# Patient Record
Sex: MALE | Race: WHITE | NOT HISPANIC OR LATINO | Employment: FULL TIME | URBAN - METROPOLITAN AREA
[De-identification: names, ages, dates, MRNs, and addresses within clinical notes are randomized per-mention and may not be internally consistent; named-entity substitution may affect disease eponyms.]

---

## 2017-05-17 ENCOUNTER — RADIANT APPOINTMENT (OUTPATIENT)
Dept: GENERAL RADIOLOGY | Facility: CLINIC | Age: 47
End: 2017-05-17
Attending: FAMILY MEDICINE
Payer: COMMERCIAL

## 2017-05-17 ENCOUNTER — OFFICE VISIT (OUTPATIENT)
Dept: FAMILY MEDICINE | Facility: CLINIC | Age: 47
End: 2017-05-17
Payer: COMMERCIAL

## 2017-05-17 VITALS
RESPIRATION RATE: 14 BRPM | HEIGHT: 67 IN | SYSTOLIC BLOOD PRESSURE: 130 MMHG | HEART RATE: 100 BPM | DIASTOLIC BLOOD PRESSURE: 70 MMHG | BODY MASS INDEX: 41.28 KG/M2 | WEIGHT: 263 LBS | TEMPERATURE: 97.6 F

## 2017-05-17 DIAGNOSIS — M79.672 LEFT FOOT PAIN: Primary | ICD-10-CM

## 2017-05-17 DIAGNOSIS — M10.9 GOUT, UNSPECIFIED: ICD-10-CM

## 2017-05-17 DIAGNOSIS — I10 HYPERTENSION GOAL BP (BLOOD PRESSURE) < 140/90: ICD-10-CM

## 2017-05-17 DIAGNOSIS — R73.01 IMPAIRED FASTING GLUCOSE: ICD-10-CM

## 2017-05-17 DIAGNOSIS — E11.9 DIABETES MELLITUS WITHOUT COMPLICATION (H): ICD-10-CM

## 2017-05-17 DIAGNOSIS — E78.5 HYPERLIPIDEMIA LDL GOAL <100: ICD-10-CM

## 2017-05-17 DIAGNOSIS — M79.672 LEFT FOOT PAIN: ICD-10-CM

## 2017-05-17 DIAGNOSIS — M10.072 ACUTE IDIOPATHIC GOUT OF LEFT FOOT: ICD-10-CM

## 2017-05-17 LAB
ANION GAP SERPL CALCULATED.3IONS-SCNC: 10 MMOL/L (ref 3–14)
BUN SERPL-MCNC: 21 MG/DL (ref 7–30)
CALCIUM SERPL-MCNC: 9.1 MG/DL (ref 8.5–10.1)
CHLORIDE SERPL-SCNC: 104 MMOL/L (ref 94–109)
CHOLEST SERPL-MCNC: 155 MG/DL
CO2 SERPL-SCNC: 25 MMOL/L (ref 20–32)
CREAT SERPL-MCNC: 0.97 MG/DL (ref 0.66–1.25)
CREAT UR-MCNC: 181 MG/DL
ERYTHROCYTE [DISTWIDTH] IN BLOOD BY AUTOMATED COUNT: 12.7 % (ref 10–15)
GFR SERPL CREATININE-BSD FRML MDRD: 82 ML/MIN/1.7M2
GLUCOSE SERPL-MCNC: 108 MG/DL (ref 70–99)
HBA1C MFR BLD: 5.4 % (ref 4.3–6)
HCT VFR BLD AUTO: 43.4 % (ref 40–53)
HDLC SERPL-MCNC: 18 MG/DL
HGB BLD-MCNC: 14.6 G/DL (ref 13.3–17.7)
LDLC SERPL CALC-MCNC: 103 MG/DL
MCH RBC QN AUTO: 32.7 PG (ref 26.5–33)
MCHC RBC AUTO-ENTMCNC: 33.6 G/DL (ref 31.5–36.5)
MCV RBC AUTO: 97 FL (ref 78–100)
MICROALBUMIN UR-MCNC: 13 MG/L
MICROALBUMIN/CREAT UR: 7.24 MG/G CR (ref 0–17)
NONHDLC SERPL-MCNC: 137 MG/DL
PLATELET # BLD AUTO: 252 10E9/L (ref 150–450)
POTASSIUM SERPL-SCNC: 3.7 MMOL/L (ref 3.4–5.3)
RBC # BLD AUTO: 4.47 10E12/L (ref 4.4–5.9)
SODIUM SERPL-SCNC: 139 MMOL/L (ref 133–144)
TRIGL SERPL-MCNC: 168 MG/DL
URATE SERPL-MCNC: 9.5 MG/DL (ref 3.5–7.2)
WBC # BLD AUTO: 8.2 10E9/L (ref 4–11)

## 2017-05-17 PROCEDURE — 36415 COLL VENOUS BLD VENIPUNCTURE: CPT | Performed by: FAMILY MEDICINE

## 2017-05-17 PROCEDURE — 85027 COMPLETE CBC AUTOMATED: CPT | Performed by: FAMILY MEDICINE

## 2017-05-17 PROCEDURE — 73630 X-RAY EXAM OF FOOT: CPT | Mod: LT

## 2017-05-17 PROCEDURE — 84550 ASSAY OF BLOOD/URIC ACID: CPT | Performed by: FAMILY MEDICINE

## 2017-05-17 PROCEDURE — 82043 UR ALBUMIN QUANTITATIVE: CPT | Performed by: FAMILY MEDICINE

## 2017-05-17 PROCEDURE — 80061 LIPID PANEL: CPT | Performed by: FAMILY MEDICINE

## 2017-05-17 PROCEDURE — 80048 BASIC METABOLIC PNL TOTAL CA: CPT | Performed by: FAMILY MEDICINE

## 2017-05-17 PROCEDURE — 83036 HEMOGLOBIN GLYCOSYLATED A1C: CPT | Performed by: FAMILY MEDICINE

## 2017-05-17 PROCEDURE — 99214 OFFICE O/P EST MOD 30 MIN: CPT | Performed by: FAMILY MEDICINE

## 2017-05-17 RX ORDER — HYDROCODONE BITARTRATE AND ACETAMINOPHEN 5; 325 MG/1; MG/1
1 TABLET ORAL EVERY 6 HOURS PRN
Qty: 15 TABLET | Refills: 0 | Status: SHIPPED | OUTPATIENT
Start: 2017-05-17 | End: 2017-12-12

## 2017-05-17 RX ORDER — INDOMETHACIN 50 MG/1
CAPSULE ORAL
Qty: 24 CAPSULE | Refills: 1 | Status: SHIPPED | OUTPATIENT
Start: 2017-05-17 | End: 2017-12-12

## 2017-05-17 NOTE — PROGRESS NOTES
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 States was treated with meds inpast and there was disucssion aout gout but he thinks th e issue is hey work and ight boot.s recalls no trauma.   PROBLEMS TO ADD ON...    Problem list and histories reviewed & adjusted, as indicated.  Additional history: as documented    Swollen and sore left foot  Patient Active Problem List   Diagnosis     Hypertension goal BP (blood pressure) < 140/90     BMI of 40.0-44.9, adult     Hyperlipidemia LDL goal <100     Impaired fasting glucose     Abnormal results of liver function studies     Gout     Preventive measure     History reviewed. No pertinent surgical history.    Social History   Substance Use Topics     Smoking status: Never Smoker     Smokeless tobacco: Never Used     Alcohol use Yes      Comment: occ.     Family History   Problem Relation Age of Onset     DIABETES Mother      Family History Negative Father      health hx unknown; smoker           Reviewed and updated as needed this visit by clinical staff  Allergies  Meds       Reviewed and updated as needed this visit by Provider         ROS:  CONSTITUTIONAL:NEGATIVE for fever, chills, change in weight  INTEGUMENTARY/SKIN: left foot is swollen and  "red on dorsum of foot.   MUSCULOSKELETAL: painful left foot  NEURO: NEGATIVE for weakness, dizziness or paresthesias  ENDOCRINE: NEGATIVE for temperature intolerance, skin/hair changes and hx elevated uric acid. Also has TYPE 2 DIABETES ON METFORMIN.     OBJECTIVE:                                                    /70  Pulse 100  Temp 97.6  F (36.4  C) (Tympanic)  Resp 14  Ht 5' 6.5\" (1.689 m)  Wt 263 lb (119.3 kg)  BMI 41.81 kg/m2  Body mass index is 41.81 kg/(m^2).  GENERAL APPEARANCE: healthy, alert and moderate distress  EYES: Eyes grossly normal to inspection, PERRL and conjunctivae and sclerae normal  MS: swollen left foot with red dorsum of foot no tenderness of great toe mtp. anle is negative able to move ankle well.   SKIN: dorsum of lef tfoot is swollen and red some tenderness proximal dorsum of left foot. The distal foot is red, no tenderenss with palpation of great toe mtp.joint;     Diagnostic test results:  Diagnostic Test Results:  Xray neative other labs pending previous uric acid was elevated. xrays will go to radiologist for interpretation.      ASSESSMENT/PLAN:                                                    1. Left foot pain    - XR Foot Left G/E 3 Views; Future    2. Diabetes mellitus without complication (H)    - Basic metabolic panel  - Hemoglobin A1c  - Albumin Random Urine Quantitative    3. Hyperlipidemia LDL goal <100    - Lipid Profile (Chol, Trig, HDL, LDL calc)    4. Acute idiopathic gout of left foot    - Uric acid  - CBC with platelets    5. Impaired fasting glucose    - metFORMIN (GLUCOPHAGE) 500 MG tablet; Take 500 mg with supper for the first 2 weeks, then take 1,000 mg with supper.  Dispense: 60 tablet; Refill: 5    6. Hypertension goal BP (blood pressure) < 140/90    - aspirin 81 MG tablet; Take 1 tablet (81 mg) by mouth daily  Dispense: 30 tablet; Refill: 11    7. Gout, unspecified    - indomethacin (INDOCIN) 50 MG capsule; Take 1 capsule PO tid with meals for 7 " days Take 1 capsule PO bid with meals on day 8. Take 1 capsule PO once with meals on day 9.  Dispense: 24 capsule; Refill: 1  - HYDROcodone-acetaminophen (NORCO) 5-325 MG per tablet; Take 1 tablet by mouth every 6 hours as needed for moderate to severe pain  Dispense: 15 tablet; Refill: 0      Labs and medications as ordered discussed probalbe gout he is convinced rlated to heavy use when gardening.   Follow up with Provider - 2-3 week or as needed.      Jeremie Caro MD  [facility]

## 2017-05-17 NOTE — LETTER
[facility]  [address]  [place] [address]  [phone]                                                                                                           Waylon Santiago  [address] [facility] [address]    May 18, 2017          Dear Waylon Santiago,  Results for orders placed or performed in visit on 05/17/17   XR Foot Left G/E 3 Views    Narrative    LEFT FOOT THREE OR MORE VIEWS  5/17/2017 9:49 AM     HISTORY: Pain in left foot.    COMPARISON: None.      Impression    IMPRESSION: Bones are normally aligned. No acute fracture.    NIKITA MARES MD         Sincerely,    Abdirizak Horner MD

## 2017-05-17 NOTE — NURSING NOTE
"Chief Complaint   Patient presents with     Foot Pain     LT       Initial /70  Pulse 100  Temp 97.6  F (36.4  C) (Tympanic)  Resp 14  Ht 5' 6.5\" (1.689 m)  Wt 263 lb (119.3 kg)  BMI 41.81 kg/m2 Estimated body mass index is 41.81 kg/(m^2) as calculated from the following:    Height as of this encounter: 5' 6.5\" (1.689 m).    Weight as of this encounter: 263 lb (119.3 kg).  Medication Reconciliation: complete     Gayla No CMA      "

## 2017-05-19 ENCOUNTER — TELEPHONE (OUTPATIENT)
Dept: FAMILY MEDICINE | Facility: CLINIC | Age: 47
End: 2017-05-19

## 2017-05-19 DIAGNOSIS — L03.116 CELLULITIS OF FOOT WITHOUT TOES, LEFT: Primary | ICD-10-CM

## 2017-05-19 RX ORDER — CEPHALEXIN 500 MG/1
500 CAPSULE ORAL 2 TIMES DAILY
Qty: 14 CAPSULE | Refills: 0 | Status: SHIPPED | OUTPATIENT
Start: 2017-05-19 | End: 2017-05-26

## 2017-05-19 NOTE — TELEPHONE ENCOUNTER
"Unfortunately provider's note not complete from OV on 5/17/2017. Based on available information, I'm assuming concerns for possible cellulitis if not improving on NSAIDS. Since sx not improving, will treat with empiric antibiotics. Patient reports no allergies to any antibiotics per chart review. Will treat with Keflex x7 days, rx sent to patient's pharmacy. Recommend taking with food; may cause loose stool as SE (not diarrhea, just \"texture change\"). If not improved by Monday, recommend recheck in clinic.  "

## 2017-05-19 NOTE — TELEPHONE ENCOUNTER
Patient called reporting left foot pain    Acute Illness   Acute illness concerns: Left foot pain, not warm to the touch, drainage, 8/10, ache when trying to walk on it, woke him up in the middle of the night from pain at rest  Onset: 3 days    Fever: no    Chills/Sweats: YES, chills last night, not sure if it is because of the temperature of the house or what    Headache (location?): no    Sinus Pressure:no    Conjunctivitis:  no    Ear Pain: no    Rhinorrhea: no    Congestion: no    Sore Throat: no     Cough: no    Wheeze: no    Decreased Appetite: no    Nausea: no    Vomiting: no    Diarrhea:  no    Dysuria/Freq.: no    Fatigue/Achiness: no    Sick/Strep Exposure: no     Therapies Tried and outcome: indocin, decreased swelling, but did not get rid of it. Dr. Caro said that if the pain didn't get better by today for the patient to call and he would prescribe an antibiotic, the patient wants it sent to [facility] [address] @ [address]            Please advise as appropriate with further recommendations as appropriate.    Nikole Moran, RN  Triage RN  [facility]    Routing to provider to see if they would feel comfortable prescribing an antibiotic for this patient or if he needs to come back in and be seen.

## 2017-05-19 NOTE — TELEPHONE ENCOUNTER
Reason for call:  Patient reporting a symptom  Symptom or request: foot pain  Duration (how long have symptoms been present): 1 week  Have you been treated for this before? Yes  Additional comments: patient saw Dr Caro on 05/17/17 and was told to call if not better  Phone Number patient can be reached at:  Other phone number:  [phone]  Best Time:  9 am  Can we leave a detailed message on this number:  YES  Call taken on 5/19/2017 at 8:04 AM by PATRICIA JARA

## 2017-12-12 ENCOUNTER — OFFICE VISIT (OUTPATIENT)
Dept: FAMILY MEDICINE | Facility: CLINIC | Age: 47
End: 2017-12-12
Payer: COMMERCIAL

## 2017-12-12 VITALS
WEIGHT: 268 LBS | RESPIRATION RATE: 18 BRPM | SYSTOLIC BLOOD PRESSURE: 124 MMHG | BODY MASS INDEX: 42.61 KG/M2 | HEART RATE: 78 BPM | DIASTOLIC BLOOD PRESSURE: 80 MMHG | TEMPERATURE: 98 F

## 2017-12-12 DIAGNOSIS — R73.01 IMPAIRED FASTING GLUCOSE: ICD-10-CM

## 2017-12-12 DIAGNOSIS — E78.5 HYPERLIPIDEMIA LDL GOAL <100: ICD-10-CM

## 2017-12-12 DIAGNOSIS — I10 HYPERTENSION GOAL BP (BLOOD PRESSURE) < 140/90: Primary | ICD-10-CM

## 2017-12-12 DIAGNOSIS — M1A.0790 IDIOPATHIC CHRONIC GOUT OF FOOT WITHOUT TOPHUS, UNSPECIFIED LATERALITY: ICD-10-CM

## 2017-12-12 DIAGNOSIS — E66.01 MORBID OBESITY (H): ICD-10-CM

## 2017-12-12 LAB
ALBUMIN SERPL-MCNC: 4 G/DL (ref 3.4–5)
ALBUMIN UR-MCNC: NEGATIVE MG/DL
ALP SERPL-CCNC: 91 U/L (ref 40–150)
ALT SERPL W P-5'-P-CCNC: 69 U/L (ref 0–70)
ANION GAP SERPL CALCULATED.3IONS-SCNC: 8 MMOL/L (ref 3–14)
APPEARANCE UR: CLEAR
AST SERPL W P-5'-P-CCNC: 24 U/L (ref 0–45)
BASOPHILS # BLD AUTO: 0 10E9/L (ref 0–0.2)
BASOPHILS NFR BLD AUTO: 0.6 %
BILIRUB SERPL-MCNC: 0.5 MG/DL (ref 0.2–1.3)
BILIRUB UR QL STRIP: NEGATIVE
BUN SERPL-MCNC: 22 MG/DL (ref 7–30)
CALCIUM SERPL-MCNC: 8.9 MG/DL (ref 8.5–10.1)
CHLORIDE SERPL-SCNC: 102 MMOL/L (ref 94–109)
CHOLEST SERPL-MCNC: 147 MG/DL
CO2 SERPL-SCNC: 28 MMOL/L (ref 20–32)
COLOR UR AUTO: YELLOW
CREAT SERPL-MCNC: 1 MG/DL (ref 0.66–1.25)
DIFFERENTIAL METHOD BLD: NORMAL
EOSINOPHIL # BLD AUTO: 0.2 10E9/L (ref 0–0.7)
EOSINOPHIL NFR BLD AUTO: 2.8 %
ERYTHROCYTE [DISTWIDTH] IN BLOOD BY AUTOMATED COUNT: 12.6 % (ref 10–15)
GFR SERPL CREATININE-BSD FRML MDRD: 80 ML/MIN/1.7M2
GLUCOSE SERPL-MCNC: 114 MG/DL (ref 70–99)
GLUCOSE UR STRIP-MCNC: NEGATIVE MG/DL
HCT VFR BLD AUTO: 43.7 % (ref 40–53)
HDLC SERPL-MCNC: 17 MG/DL
HGB BLD-MCNC: 14.9 G/DL (ref 13.3–17.7)
HGB UR QL STRIP: NEGATIVE
KETONES UR STRIP-MCNC: NEGATIVE MG/DL
LDLC SERPL CALC-MCNC: 87 MG/DL
LEUKOCYTE ESTERASE UR QL STRIP: NEGATIVE
LYMPHOCYTES # BLD AUTO: 1.7 10E9/L (ref 0.8–5.3)
LYMPHOCYTES NFR BLD AUTO: 25.3 %
MCH RBC QN AUTO: 32.6 PG (ref 26.5–33)
MCHC RBC AUTO-ENTMCNC: 34.1 G/DL (ref 31.5–36.5)
MCV RBC AUTO: 96 FL (ref 78–100)
MONOCYTES # BLD AUTO: 0.7 10E9/L (ref 0–1.3)
MONOCYTES NFR BLD AUTO: 9.5 %
NEUTROPHILS # BLD AUTO: 4.2 10E9/L (ref 1.6–8.3)
NEUTROPHILS NFR BLD AUTO: 61.8 %
NITRATE UR QL: NEGATIVE
NONHDLC SERPL-MCNC: 130 MG/DL
PH UR STRIP: 5.5 PH (ref 5–7)
PLATELET # BLD AUTO: 228 10E9/L (ref 150–450)
POTASSIUM SERPL-SCNC: 3.8 MMOL/L (ref 3.4–5.3)
PROT SERPL-MCNC: 7.4 G/DL (ref 6.8–8.8)
RBC # BLD AUTO: 4.57 10E12/L (ref 4.4–5.9)
RBC #/AREA URNS AUTO: NORMAL /HPF
SODIUM SERPL-SCNC: 138 MMOL/L (ref 133–144)
SOURCE: NORMAL
SP GR UR STRIP: 1.01 (ref 1–1.03)
TRIGL SERPL-MCNC: 215 MG/DL
URATE SERPL-MCNC: 11.9 MG/DL (ref 3.5–7.2)
UROBILINOGEN UR STRIP-ACNC: 0.2 EU/DL (ref 0.2–1)
WBC # BLD AUTO: 6.8 10E9/L (ref 4–11)
WBC #/AREA URNS AUTO: NORMAL /HPF

## 2017-12-12 PROCEDURE — 81001 URINALYSIS AUTO W/SCOPE: CPT | Performed by: FAMILY MEDICINE

## 2017-12-12 PROCEDURE — 36415 COLL VENOUS BLD VENIPUNCTURE: CPT | Performed by: FAMILY MEDICINE

## 2017-12-12 PROCEDURE — 80061 LIPID PANEL: CPT | Performed by: FAMILY MEDICINE

## 2017-12-12 PROCEDURE — 84550 ASSAY OF BLOOD/URIC ACID: CPT | Performed by: FAMILY MEDICINE

## 2017-12-12 PROCEDURE — 80053 COMPREHEN METABOLIC PANEL: CPT | Performed by: FAMILY MEDICINE

## 2017-12-12 PROCEDURE — 85025 COMPLETE CBC W/AUTO DIFF WBC: CPT | Performed by: FAMILY MEDICINE

## 2017-12-12 PROCEDURE — 99214 OFFICE O/P EST MOD 30 MIN: CPT | Performed by: FAMILY MEDICINE

## 2017-12-12 RX ORDER — OLMESARTAN MEDOXOMIL, AMLODIPINE AND HYDROCHLOROTHIAZIDE TABLET 40/5/25 MG 40; 5; 25 MG/1; MG/1; MG/1
1 TABLET ORAL DAILY
Qty: 30 TABLET | Refills: 1 | Status: SHIPPED | OUTPATIENT
Start: 2017-12-12 | End: 2018-02-28

## 2017-12-12 NOTE — LETTER
December 14, 2017      Waylon Santiago  [address] [address]        Dear Waylon,    We are writing to inform you of your test results.    Please make another appointment with me as we need to totally revamp your medications.    Resulted Orders   UA with Microscopic   Result Value Ref Range    Color Urine Yellow     Appearance Urine Clear     Glucose Urine Negative NEG^Negative mg/dL    Bilirubin Urine Negative NEG^Negative    Ketones Urine Negative NEG^Negative mg/dL    Specific Gravity Urine 1.015 1.003 - 1.035    pH Urine 5.5 5.0 - 7.0 pH    Protein Albumin Urine Negative NEG^Negative mg/dL    Urobilinogen Urine 0.2 0.2 - 1.0 EU/dL    Nitrite Urine Negative NEG^Negative    Blood Urine Negative NEG^Negative    Leukocyte Esterase Urine Negative NEG^Negative    Source Midstream Urine     WBC Urine O - 2 OTO2^O - 2 /HPF    RBC Urine O - 2 OTO2^O - 2 /HPF   CBC with platelets differential   Result Value Ref Range    WBC 6.8 4.0 - 11.0 10e9/L    RBC Count 4.57 4.4 - 5.9 10e12/L    Hemoglobin 14.9 13.3 - 17.7 g/dL    Hematocrit 43.7 40.0 - 53.0 %    MCV 96 78 - 100 fl    MCH 32.6 26.5 - 33.0 pg    MCHC 34.1 31.5 - 36.5 g/dL    RDW 12.6 10.0 - 15.0 %    Platelet Count 228 150 - 450 10e9/L    Diff Method Automated Method     % Neutrophils 61.8 %    % Lymphocytes 25.3 %    % Monocytes 9.5 %    % Eosinophils 2.8 %    % Basophils 0.6 %    Absolute Neutrophil 4.2 1.6 - 8.3 10e9/L    Absolute Lymphocytes 1.7 0.8 - 5.3 10e9/L    Absolute Monocytes 0.7 0.0 - 1.3 10e9/L    Absolute Eosinophils 0.2 0.0 - 0.7 10e9/L    Absolute Basophils 0.0 0.0 - 0.2 10e9/L   Comprehensive metabolic panel   Result Value Ref Range    Sodium 138 133 - 144 mmol/L    Potassium 3.8 3.4 - 5.3 mmol/L    Chloride 102 94 - 109 mmol/L    Carbon Dioxide 28 20 - 32 mmol/L    Anion Gap 8 3 - 14 mmol/L    Glucose 114 (H) 70 - 99 mg/dL      Comment:      Fasting specimen    Urea Nitrogen 22 7 - 30 mg/dL    Creatinine 1.00 0.66 - 1.25 mg/dL     GFR Estimate 80 >60 mL/min/1.7m2      Comment:      Non  GFR Calc    GFR Estimate If Black >90 >60 mL/min/1.7m2      Comment:       GFR Calc    Calcium 8.9 8.5 - 10.1 mg/dL    Bilirubin Total 0.5 0.2 - 1.3 mg/dL    Albumin 4.0 3.4 - 5.0 g/dL    Protein Total 7.4 6.8 - 8.8 g/dL    Alkaline Phosphatase 91 40 - 150 U/L    ALT 69 0 - 70 U/L    AST 24 0 - 45 U/L   Lipid Profile   Result Value Ref Range    Cholesterol 147 <200 mg/dL    Triglycerides 215 (H) <150 mg/dL      Comment:      Borderline high:  150-199 mg/dl  High:             200-499 mg/dl  Very high:       >499 mg/dl  Fasting specimen      HDL Cholesterol 17 (L) >39 mg/dL    LDL Cholesterol Calculated 87 <100 mg/dL      Comment:      Desirable:       <100 mg/dl    Non HDL Cholesterol 130 (H) <130 mg/dL      Comment:      Above Desirable:  130-159 mg/dl  Borderline high:  160-189 mg/dl  High:             190-219 mg/dl  Very high:       >219 mg/dl     Uric acid   Result Value Ref Range    Uric Acid 11.9 (H) 3.5 - 7.2 mg/dL       If you have any questions or concerns, please call the clinic at the number listed above.       Sincerely,        Maynor Johnson MD

## 2017-12-12 NOTE — PATIENT INSTRUCTIONS
Will check labs. Restart metformin.  We had a discussion about starting medication to prevent gout.  We will check his tests first.  He has had both inflammation in his feet and gout in his feet.  We also had a conversation about potentially changing his blood pressure medicine to get rid of the HCTZ depending on what his uric acid level is today.  His follow-up appointment will be pending review of his tests.  Discussion with the patient about losing weight and increasing exercise to prevent the onset of diabetes.

## 2017-12-12 NOTE — PROGRESS NOTES
SUBJECTIVE:   Waylon Santiago is a 47 year old male who presents to clinic today for the following health issues:      Hypertension Follow-up      Outpatient blood pressures are not being checked.    Low Salt Diet: no added salt        Amount of exercise or physical activity: None    Problems taking medications regularly: No    Medication side effects: none    Diet: regular (no restrictions)        Gout  Duration: years  Description   Location: big toe - right  Joint Swelling: YES  Redness: YES  Pain intensity:  moderate  Accompanying signs and symptoms: None  History  Previous history of gout: YES   Trauma to the area: no   Precipitating factors:   Alcohol usage: Occassional  Diuretic use: YES  Recent illness: no   Therapies tried and outcome: colchicine        Problem list and histories reviewed & adjusted, as indicated.  Additional history: as documented    Patient Active Problem List   Diagnosis     Hypertension goal BP (blood pressure) < 140/90     BMI of 40.0-44.9, adult     Hyperlipidemia LDL goal <100     Impaired fasting glucose     Abnormal results of liver function studies     Gout     Preventive measure     Morbid obesity (H)     History reviewed. No pertinent surgical history.    Social History   Substance Use Topics     Smoking status: Never Smoker     Smokeless tobacco: Never Used     Alcohol use Yes      Comment: occ.     Family History   Problem Relation Age of Onset     DIABETES Mother      Family History Negative Father      health hx unknown; smoker             Reviewed and updated as needed this visit by clinical staffTobacco  Allergies  Meds  Med Hx  Surg Hx  Fam Hx  Soc Hx      Reviewed and updated as needed this visit by Provider         ROS:  Constitutional, neuro, ENT, endocrine, pulmonary, cardiac, gastrointestinal, genitourinary, musculoskeletal, integument and psychiatric systems are negative, except as otherwise noted.      OBJECTIVE:                                                     /80  Pulse 78  Temp 98  F (36.7  C) (Tympanic)  Resp 18  Wt 268 lb (121.6 kg)  BMI 42.61 kg/m2  Body mass index is 42.61 kg/(m^2).  GENERAL APPEARANCE: healthy, alert, no distress and Nourishment obese          ASSESSMENT/PLAN:                                                        ICD-10-CM    1. Hypertension goal BP (blood pressure) < 140/90 I10 Olmesartan-Amlodipine-HCTZ 40-5-25 MG TABS     UA with Microscopic     CBC with platelets differential     Comprehensive metabolic panel   2. Hyperlipidemia LDL goal <100 E78.5 Lipid Profile   3. Idiopathic chronic gout of foot without tophus, unspecified laterality M1A.0790 Uric acid   4. Impaired fasting glucose R73.01 metFORMIN (GLUCOPHAGE) 500 MG tablet   5. Morbid obesity (H) E66.01        Patient Instructions   Will check labs. Restart metformin.  We had a discussion about starting medication to prevent gout.  We will check his tests first.  He has had both inflammation in his feet and gout in his feet.  We also had a conversation about potentially changing his blood pressure medicine to get rid of the HCTZ depending on what his uric acid level is today.  His follow-up appointment will be pending review of his tests.  Discussion with the patient about losing weight and increasing exercise to prevent the onset of diabetes.      Maynor Johnson MD  [facility]

## 2017-12-12 NOTE — MR AVS SNAPSHOT
After Visit Summary   12/12/2017    Waylon Santiago    MRN: [id number]           Patient Information     Date Of Birth          1970        Visit Information        Provider Department      12/12/2017 7:15 AM Maynor Johnson MD [facility]        Today's Diagnoses     Hypertension goal BP (blood pressure) < 140/90    -  1    Hyperlipidemia LDL goal <100        Idiopathic chronic gout of foot without tophus, unspecified laterality        Impaired fasting glucose        Morbid obesity (H)          Care Instructions    Will check labs. Restart metformin.  We had a discussion about starting medication to prevent gout.  We will check his tests first.  He has had both inflammation in his feet and gout in his feet.  We also had a conversation about potentially changing his blood pressure medicine to get rid of the HCTZ depending on what his uric acid level is today.  His follow-up appointment will be pending review of his tests.  Discussion with the patient about losing weight and increasing exercise to prevent the onset of diabetes.          Follow-ups after your visit        Who to contact     If you have questions or need follow up information about today's clinic visit or your schedule please contact [facility] directly at [phone].  Normal or non-critical lab and imaging results will be communicated to you by MyChart, letter or phone within 4 business days after the clinic has received the results. If you do not hear from us within 7 days, please contact the clinic through MyChart or phone. If you have a critical or abnormal lab result, we will notify you by phone as soon as possible.  Submit refill requests through [email or web address] or call your pharmacy and they will forward the refill request to us. Please allow 3 business days for your refill to be completed.          Additional Information About Your Visit        [facility] [employer]  "Information     [employer] lets you send messages to your doctor, view your test results, renew your prescriptions, schedule appointments and more. To sign up, go to [email or web address] . Click on \"Log in\" on the left side of the screen, which will take you to the Welcome page. Then click on \"Sign up Now\" on the right side of the page.     You will be asked to enter the access code listed below, as well as some personal information. Please follow the directions to create your username and password.     Your access code is: [email or web address]  Expires: 3/12/2018  8:09 AM     Your access code will  in 90 days. If you need help or a new code, please call your [place] clinic or [phone].        Care EveryWhere ID     This is your Care EveryWhere ID. This could be used by other organizations to access your [place] medical records  [id number]        Your Vitals Were     Pulse Temperature Respirations BMI (Body Mass Index)          78 98  F (36.7  C) (Tympanic) 18 42.61 kg/m2         Blood Pressure from Last 3 Encounters:   17 124/80   17 130/70   16 136/88    Weight from Last 3 Encounters:   17 268 lb (121.6 kg)   17 263 lb (119.3 kg)   16 274 lb (124.3 kg)              We Performed the Following     CBC with platelets differential     Comprehensive metabolic panel     Lipid Profile     UA with Microscopic     Uric acid          Today's Medication Changes          These changes are accurate as of: 17  8:09 AM.  If you have any questions, ask your nurse or doctor.               These medicines have changed or have updated prescriptions.        Dose/Directions    metFORMIN 500 MG tablet   Commonly known as:  GLUCOPHAGE   This may have changed:    - how much to take  - how to take this  - when to take this  - additional instructions   Used for:  Impaired fasting glucose   Changed by:  Maynor Johnson MD        Dose:  500 mg   Take 1 tablet (500 mg) by mouth 2 times " daily (with meals)   Quantity:  60 tablet   Refills:  1       Olmesartan-Amlodipine-HCTZ 40-5-25 MG Tabs   This may have changed:  See the new instructions.   Used for:  Hypertension goal BP (blood pressure) < 140/90   Changed by:  Maynor Johnson MD        Dose:  1 tablet   Take 1 tablet by mouth daily   Quantity:  30 tablet   Refills:  1         Stop taking these medicines if you haven't already. Please contact your care team if you have questions.     HYDROcodone-acetaminophen 5-325 MG per tablet   Commonly known as:  NORCO   Stopped by:  Maynor Johnson MD           indomethacin 50 MG capsule   Commonly known as:  INDOCIN   Stopped by:  Maynor Johnson MD                Where to get your medicines      These medications were sent to [facility] [id number] [place] [address] [address] AT [facility] [address]  [address], [facility] [address]     Phone:  [phone]     metFORMIN 500 MG tablet    Olmesartan-Amlodipine-HCTZ 40-5-25 MG Tabs                Primary Care Provider Office Phone # Fax #    Abdirizak Horner [phone] [address] [facility] [address]        Equal Access to Services     DORA DIAMOND AH: Hadii adela ku hadasho Soomaali, waaxda luqadaha, qaybta kaalmada adeegyada, waxay idiin haycitlalin chris khthuy rivers. So [facility] [phone].    ATENCIÓN: Si habla español, tiene a lindsay disposición servicios gratuitos de asistencia lingüística. Llame al [phone].    We comply with applicable federal civil rights laws and [place] laws. We do not discriminate on the basis of race, color, national origin, age, disability, sex, sexual orientation, or gender identity.            Thank you!     Thank you for choosing [facility]  for your care. Our goal is always to provide you with excellent care. Hearing back from our patients is one way we can continue to improve our services. Please take a few minutes to  complete the written survey that you may receive in the mail after your visit with us. Thank you!             Your Updated Medication List - Protect others around you: Learn how to safely use, store and throw away your medicines at www.disposemymeds.org.          This list is accurate as of: 12/12/17  8:09 AM.  Always use your most recent med list.                   Brand Name Dispense Instructions for use Diagnosis    aspirin 81 MG tablet     30 tablet    Take 1 tablet (81 mg) by mouth daily    Hypertension goal BP (blood pressure) < 140/90       metFORMIN 500 MG tablet    GLUCOPHAGE    60 tablet    Take 1 tablet (500 mg) by mouth 2 times daily (with meals)    Impaired fasting glucose       Olmesartan-Amlodipine-HCTZ 40-5-25 MG Tabs     30 tablet    Take 1 tablet by mouth daily    Hypertension goal BP (blood pressure) < 140/90

## 2017-12-12 NOTE — NURSING NOTE
"Chief Complaint   Patient presents with     Hypertension       Initial /80  Pulse 78  Temp 98  F (36.7  C) (Tympanic)  Resp 18  Wt 268 lb (121.6 kg)  BMI 42.61 kg/m2 Estimated body mass index is 42.61 kg/(m^2) as calculated from the following:    Height as of 5/17/17: 5' 6.5\" (1.689 m).    Weight as of this encounter: 268 lb (121.6 kg).  Medication Reconciliation: complete     Gayla No CMA      "

## 2018-02-25 PROBLEM — E66.01 MORBID OBESITY (H): Status: ACTIVE | Noted: 2017-12-12

## 2018-02-27 ENCOUNTER — OFFICE VISIT (OUTPATIENT)
Dept: FAMILY MEDICINE | Facility: CLINIC | Age: 48
End: 2018-02-27
Payer: COMMERCIAL

## 2018-02-27 VITALS
TEMPERATURE: 98 F | HEIGHT: 67 IN | DIASTOLIC BLOOD PRESSURE: 76 MMHG | SYSTOLIC BLOOD PRESSURE: 114 MMHG | RESPIRATION RATE: 16 BRPM | WEIGHT: 268 LBS | BODY MASS INDEX: 42.06 KG/M2 | HEART RATE: 88 BPM

## 2018-02-27 DIAGNOSIS — E78.5 HYPERLIPIDEMIA LDL GOAL <100: ICD-10-CM

## 2018-02-27 DIAGNOSIS — M1A.0790 IDIOPATHIC CHRONIC GOUT OF FOOT WITHOUT TOPHUS, UNSPECIFIED LATERALITY: Primary | ICD-10-CM

## 2018-02-27 DIAGNOSIS — I10 ESSENTIAL HYPERTENSION: ICD-10-CM

## 2018-02-27 DIAGNOSIS — D17.30 LIPOMA OF SKIN AND SUBCUTANEOUS TISSUE: ICD-10-CM

## 2018-02-27 PROCEDURE — 99214 OFFICE O/P EST MOD 30 MIN: CPT | Performed by: FAMILY MEDICINE

## 2018-02-27 RX ORDER — ALLOPURINOL 300 MG/1
300 TABLET ORAL DAILY
Qty: 30 TABLET | Refills: 1 | Status: SHIPPED | OUTPATIENT
Start: 2018-03-13 | End: 2018-06-12

## 2018-02-27 RX ORDER — ALLOPURINOL 100 MG/1
TABLET ORAL
Qty: 21 TABLET | Refills: 0 | Status: SHIPPED | OUTPATIENT
Start: 2018-02-27 | End: 2018-06-12

## 2018-02-27 RX ORDER — COLCHICINE 0.6 MG/1
TABLET ORAL
Qty: 30 TABLET | Refills: 3 | Status: SHIPPED | OUTPATIENT
Start: 2018-02-27 | End: 2019-06-26

## 2018-02-27 NOTE — NURSING NOTE
"Chief Complaint   Patient presents with     Hypertension       Initial /76 (BP Location: Right arm)  Pulse 88  Temp 98  F (36.7  C) (Tympanic)  Resp 16  Ht 5' 6.5\" (1.689 m)  Wt 268 lb (121.6 kg)  BMI 42.61 kg/m2 Estimated body mass index is 42.61 kg/(m^2) as calculated from the following:    Height as of this encounter: 5' 6.5\" (1.689 m).    Weight as of this encounter: 268 lb (121.6 kg).  Medication Reconciliation: complete     Gyala No CMA      "

## 2018-02-27 NOTE — PROGRESS NOTES
SUBJECTIVE:   Waylon Santiago is a 47 year old male who presents to clinic today for the following health issues:      Hypertension Follow-up      Outpatient blood pressures are not being checked.    Low Salt Diet: no added salt      Amount of exercise or physical activity: None    Problems taking medications regularly: No    Medication side effects: none    Diet: regular (no restrictions)        Musculoskeletal problem/pain      Duration: one week    Description  Location: left 1st MTP    Intensity:  moderate    Accompanying signs and symptoms: swelling    History  Previous similar problem: no   Previous evaluation:  none    Precipitating or alleviating factors:  Trauma or overuse: no   Aggravating factors include: none    Therapies tried and outcome: nothing      Problem list and histories reviewed & adjusted, as indicated.  Additional history: as documented    Patient Active Problem List   Diagnosis     Essential hypertension     BMI of 40.0-44.9, adult     Hyperlipidemia LDL goal <100     Impaired fasting glucose     Abnormal results of liver function studies     Gout     Preventive measure     Morbid obesity (H)     Lipoma of skin and subcutaneous tissue     History reviewed. No pertinent surgical history.    Social History   Substance Use Topics     Smoking status: Never Smoker     Smokeless tobacco: Never Used     Alcohol use Yes      Comment: occ.     Family History   Problem Relation Age of Onset     DIABETES Mother      Family History Negative Father      health hx unknown; smoker           Reviewed and updated as needed this visit by clinical staff  Tobacco  Allergies  Med Hx  Surg Hx  Fam Hx  Soc Hx      Reviewed and updated as needed this visit by Provider         ROS:  Constitutional, HEENT, cardiovascular, pulmonary, gi and gu systems are negative, except as otherwise noted.SKIN: The patient has a growth on the left upper part of the back    OBJECTIVE:                                          "           /76 (BP Location: Right arm)  Pulse 88  Temp 98  F (36.7  C) (Tympanic)  Resp 16  Ht 5' 6.5\" (1.689 m)  Wt 268 lb (121.6 kg)  BMI 42.61 kg/m2  Body mass index is 42.61 kg/(m^2).  GENERAL APPEARANCE: healthy, alert and no distress  MS: There is swelling to the left first MTP joint on the foot.  The area is markedly tender to palpation.  SKIN: There is a 6 cm round, nontender mass on the left side of the back towards the top above the scapula.       ASSESSMENT/PLAN:                                                        ICD-10-CM    1. Idiopathic chronic gout of foot without tophus, unspecified laterality M1A.0790 allopurinol (ZYLOPRIM) 100 MG tablet     allopurinol (ZYLOPRIM) 300 MG tablet     colchicine (COLCRYS) 0.6 MG tablet     Uric acid   2. Essential hypertension I10 Comprehensive metabolic panel     Hemoglobin A1c   3. Hyperlipidemia LDL goal <100 E78.5 Lipid panel reflex to direct LDL Fasting   4. Lipoma of skin and subcutaneous tissue D17.30        Patient Instructions   We will check uric acid level today.  I started him on allopurinol 100 mg daily for a weeks then 200 mg daily for a week then advance to 300 mg daily.  We will follow-up in about 6 weeks with a uric acid level.  Other labs were also drawn as they were doing including his metabolic panel, his hemoglobin A1c and his lipid panel.  Uric acid level was checked as noted.  I gave the patient a list of dietary issues for gout.  We did discuss possibly removing his hydrochlorothiazide from his blood pressure regimen.  I did not do that today waiting to see whether or not he gets any further issues with his gout.  I gave him a prescription for colchicine.  Will see back as needed.  Gout Diet  Gout is a painful condition caused by an excess of uric acid, a waste product made by the body. Uric acid forms crystals that collect in the joints. The immune response to these crystals brings on symptoms of joint pain and swelling. This " is called a gout attack. Often, medications and diet changes are combined to manage gout. Below are some guidelines for changing your diet to help you manage gout and prevent attacks. Your health care provider will help you determine the best eating plan for you.     Eating to manage gout  Weight loss for those who are overweight may help reduce gout attacks.  Eat less of these foods  Eating too many foods containing purines may raise the levels of uric acid in your body. This raises your risk for a gout attack. Try to limit these foods and drinks:    Alcohol, such as beer and red wine. You may be told to avoid alcohol completely.    Soft drinks that contain sugar or high fructose corn syrup    Certain fish, including anchovies, sardines, fish eggs, and herring    Shellfish    Certain meats, such as red meat, hot dogs, luncheon meats, and turkey    Organ meats, such as liver, kidneys, and sweetbreads    Legumes, such as dried beans and peas    Other high fat foods such as gravy, whole milk, and high fat cheeses    Vegetables such as asparagus, cauliflower, spinach, and mushrooms used to be thought to contribute to an increased risk for a gout attack, but recent studies show that high purine vegetables don't increase the risk for a gout attack.  Eat more of these foods  Other foods may be helpful for people with gout. Add some of these foods to your diet:    Cherries contain chemicals that may lower uric acid.    Omega fatty acids. These are found in some fatty fish such as salmon, certain oils (flax, olive, or nut), and nuts themselves. Omega fatty acids may help prevent inflammation due to gout.    Dairy products that are low-fat or fat-free, such as cheese and yogurt    Complex carbohydrate foods, including whole grains, brown rice, oats, and beans    Coffee, in moderation    Water, approximately 64 ounces per day  Follow-up care  Follow up with your healthcare provider as advised.  When to seek medical  advice  Call your healthcare provider right away if any of these occur:    Return of gout symptoms, usually at night:    Severe pain, swelling, and heat in a joint, especially the base of the big toe    Affected joint is hard to move    Skin of the affected joint is purple or red    Fever of 100.4 F (38 C) or higher    Pain that doesn't get better even with prescribed medicine   Date Last Reviewed: 1/12/2016 2000-2017 [employer]. [address] [place] [address]. All rights reserved. This information is not intended as a substitute for professional medical care. Always follow your healthcare professional's instructions.            Maynor Johnson MD  [facility]

## 2018-02-27 NOTE — MR AVS SNAPSHOT
After Visit Summary   2/27/2018    Waylon Santiago    MRN: [id number]           Patient Information     Date Of Birth          1970        Visit Information        Provider Department      2/27/2018 4:30 PM Maynor Johnson MD [facility]        Today's Diagnoses     Idiopathic chronic gout of foot without tophus, unspecified laterality    -  1    Essential hypertension        Hyperlipidemia LDL goal <100        Lipoma of skin and subcutaneous tissue          Care Instructions    We will check uric acid level today.  I started him on allopurinol 100 mg daily for a weeks then 200 mg daily for a week then advance to 300 mg daily.  We will follow-up in about 6 weeks with a uric acid level.  Other labs were also drawn as they were doing including his metabolic panel, his hemoglobin A1c and his lipid panel.  Uric acid level was checked as noted.  I gave the patient a list of dietary issues for gout.  We did discuss possibly removing his hydrochlorothiazide from his blood pressure regimen.  I did not do that today waiting to see whether or not he gets any further issues with his gout.  I gave him a prescription for colchicine.  Will see back as needed.  Gout Diet  Gout is a painful condition caused by an excess of uric acid, a waste product made by the body. Uric acid forms crystals that collect in the joints. The immune response to these crystals brings on symptoms of joint pain and swelling. This is called a gout attack. Often, medications and diet changes are combined to manage gout. Below are some guidelines for changing your diet to help you manage gout and prevent attacks. Your health care provider will help you determine the best eating plan for you.     Eating to manage gout  Weight loss for those who are overweight may help reduce gout attacks.  Eat less of these foods  Eating too many foods containing purines may raise the levels of uric acid in your  body. This raises your risk for a gout attack. Try to limit these foods and drinks:    Alcohol, such as beer and red wine. You may be told to avoid alcohol completely.    Soft drinks that contain sugar or high fructose corn syrup    Certain fish, including anchovies, sardines, fish eggs, and herring    Shellfish    Certain meats, such as red meat, hot dogs, luncheon meats, and turkey    Organ meats, such as liver, kidneys, and sweetbreads    Legumes, such as dried beans and peas    Other high fat foods such as gravy, whole milk, and high fat cheeses    Vegetables such as asparagus, cauliflower, spinach, and mushrooms used to be thought to contribute to an increased risk for a gout attack, but recent studies show that high purine vegetables don't increase the risk for a gout attack.  Eat more of these foods  Other foods may be helpful for people with gout. Add some of these foods to your diet:    Cherries contain chemicals that may lower uric acid.    Omega fatty acids. These are found in some fatty fish such as salmon, certain oils (flax, olive, or nut), and nuts themselves. Omega fatty acids may help prevent inflammation due to gout.    Dairy products that are low-fat or fat-free, such as cheese and yogurt    Complex carbohydrate foods, including whole grains, brown rice, oats, and beans    Coffee, in moderation    Water, approximately 64 ounces per day  Follow-up care  Follow up with your healthcare provider as advised.  When to seek medical advice  Call your healthcare provider right away if any of these occur:    Return of gout symptoms, usually at night:    Severe pain, swelling, and heat in a joint, especially the base of the big toe    Affected joint is hard to move    Skin of the affected joint is purple or red    Fever of 100.4 F (38 C) or higher    Pain that doesn't get better even with prescribed medicine   Date Last Reviewed: 1/12/2016 2000-2017 [employer]. [address] [facility] [facility],  "[place] [address]. All rights reserved. This information is not intended as a substitute for professional medical care. Always follow your healthcare professional's instructions.                Follow-ups after your visit        Who to contact     If you have questions or need follow up information about today's clinic visit or your schedule please contact [facility] directly at [phone].  Normal or non-critical lab and imaging results will be communicated to you by MyChart, letter or phone within 4 business days after the clinic has received the results. If you do not hear from us within 7 days, please contact the clinic through [employer] or phone. If you have a critical or abnormal lab result, we will notify you by phone as soon as possible.  Submit refill requests through [employer] or call your pharmacy and they will forward the refill request to us. Please allow 3 business days for your refill to be completed.          Additional Information About Your Visit        [employer] Information     [employer] lets you send messages to your doctor, view your test results, renew your prescriptions, schedule appointments and more. To sign up, go to [email or web address] . Click on \"Log in\" on the left side of the screen, which will take you to the Welcome page. Then click on \"Sign up Now\" on the right side of the page.     You will be asked to enter the access code listed below, as well as some personal information. Please follow the directions to create your username and password.     Your access code is: [email or web address]  Expires: 3/12/2018  8:09 AM     Your access code will  in 90 days. If you need help or a new code, please call your [facility] or [phone].        Care EveryWhere ID     This is your Care EveryWhere ID. This could be used by other organizations to access your [place] medical records  [id number]        Your Vitals Were     Pulse Temperature Respirations Height BMI " "(Body Mass Index)       88 98  F (36.7  C) (Tympanic) 16 5' 6.5\" (1.689 m) 42.61 kg/m2        Blood Pressure from Last 3 Encounters:   02/27/18 114/76   12/12/17 124/80   05/17/17 130/70    Weight from Last 3 Encounters:   02/27/18 268 lb (121.6 kg)   12/12/17 268 lb (121.6 kg)   05/17/17 263 lb (119.3 kg)                 Today's Medication Changes          These changes are accurate as of 2/27/18 11:59 PM.  If you have any questions, ask your nurse or doctor.               Start taking these medicines.        Dose/Directions    * allopurinol 100 MG tablet   Commonly known as:  ZYLOPRIM   Used for:  Idiopathic chronic gout of foot without tophus, unspecified laterality   Started by:  Maynor Johnson MD        One daily for a week then 2 daily until gone   Quantity:  21 tablet   Refills:  0       * allopurinol 300 MG tablet   Commonly known as:  ZYLOPRIM   Used for:  Idiopathic chronic gout of foot without tophus, unspecified laterality   Started by:  Maynor Johnson MD        Dose:  300 mg   Start taking on:  3/13/2018   Take 1 tablet (300 mg) by mouth daily   Quantity:  30 tablet   Refills:  1       colchicine 0.6 MG tablet   Commonly known as:  COLCRYS   Used for:  Idiopathic chronic gout of foot without tophus, unspecified laterality   Started by:  Maynor Johnson MD        2 tabs at the onset of flare, then 1 tab every 2 hrs until pain is better or diarrhea occurs, up to 10 doses. Wait 3 days until taking again   Quantity:  30 tablet   Refills:  3       * Notice:  This list has 2 medication(s) that are the same as other medications prescribed for you. Read the directions carefully, and ask your doctor or other care provider to review them with you.         Where to get your medicines      These medications were sent to [facility] [address] [place], [place] - [address] AT [facility] [place]  [address], [facility] [address]     Phone:  " [phone]     allopurinol 100 MG tablet    allopurinol 300 MG tablet    colchicine 0.6 MG tablet                Primary Care Provider Office Phone # Fax #    Maynor Johnson [phone] [address] [facility] [address]        Equal Access to Services     DORA DIAMOND : Hadii aad ku hadasho Soomaali, waaxda luqadaha, qaybta kaalmada adeegyada, waxay claritzain hayaan adeeg marylu laian . So [facility] [phone].    ATENCIÓN: Si habla español, tiene a lindsay disposición servicios gratuitos de asistencia lingüística. Ena al [phone].    We comply with applicable federal civil rights laws and [place] laws. We do not discriminate on the basis of race, color, national origin, age, disability, sex, sexual orientation, or gender identity.            Thank you!     [facility]. Our goal is always to provide you with excellent care. Hearing back from our patients is one way we can continue to improve our services. Please take a few minutes to complete the written survey that you may receive in the mail after your visit with us. Thank you!             Your Updated Medication List - Protect others around you: Learn how to safely use, store and throw away your medicines at www.disposemymeds.org.          This list is accurate as of 2/27/18 11:59 PM.  Always use your most recent med list.                   Brand Name Dispense Instructions for use Diagnosis    * allopurinol 100 MG tablet    ZYLOPRIM    21 tablet    One daily for a week then 2 daily until gone    Idiopathic chronic gout of foot without tophus, unspecified laterality       * allopurinol 300 MG tablet   Start taking on:  3/13/2018    ZYLOPRIM    30 tablet    Take 1 tablet (300 mg) by mouth daily    Idiopathic chronic gout of foot without tophus, unspecified laterality       aspirin 81 MG tablet     30 tablet    Take 1 tablet (81 mg) by mouth daily    Hypertension  goal BP (blood pressure) < 140/90       colchicine 0.6 MG tablet    COLCRYS    30 tablet    2 tabs at the onset of flare, then 1 tab every 2 hrs until pain is better or diarrhea occurs, up to 10 doses. Wait 3 days until taking again    Idiopathic chronic gout of foot without tophus, unspecified laterality       metFORMIN 500 MG tablet    GLUCOPHAGE    60 tablet    Take 1 tablet (500 mg) by mouth 2 times daily (with meals)    Impaired fasting glucose       * Notice:  This list has 2 medication(s) that are the same as other medications prescribed for you. Read the directions carefully, and ask your doctor or other care provider to review them with you.

## 2018-02-27 NOTE — LETTER
[facility]  [address]  [place] [address]  [phone] June 5, 2018    Waylon Santiago  [address]  [facility] [place] [facility] [address]              Dear Waylon Santiago    This is to remind you that your multiple labs arre due.    You may call our office at [phone]   to schedule an appointment.    Please disregard this notice if you have already had your labs drawn or made an appointment.        Sincerely,        Maynor Johnson MD

## 2018-02-28 DIAGNOSIS — R73.01 IMPAIRED FASTING GLUCOSE: ICD-10-CM

## 2018-02-28 DIAGNOSIS — I10 HYPERTENSION GOAL BP (BLOOD PRESSURE) < 140/90: ICD-10-CM

## 2018-02-28 RX ORDER — OLMESARTAN MEDOXOMIL, AMLODIPINE AND HYDROCHLOROTHIAZIDE TABLET 40/5/25 MG 40; 5; 25 MG/1; MG/1; MG/1
1 TABLET ORAL DAILY
Qty: 30 TABLET | Refills: 9 | Status: SHIPPED | OUTPATIENT
Start: 2018-02-28 | End: 2018-06-12

## 2018-02-28 NOTE — TELEPHONE ENCOUNTER
Reason for Call:  Medication or medication refill:    Do you use a [facility]?  Name of the pharmacy and phone number for the current request:  [facility] [place]    Name of the medication requested: Olmesartan-Amlodipine-HCTZ 40-5-25 MG TABS AND METFORMAN    Other request: PT SAW DR HOBBS YESTERDAY    Can we leave a detailed message on this number? YES    Phone number patient can be reached at: Cell number on file:    Telephone Information:   Mobile [phone]       Best Time: ANY    Call taken on 2/28/2018 at 9:14 AM by SANDOVAL HORTON

## 2018-02-28 NOTE — TELEPHONE ENCOUNTER
"Requested Prescriptions   Pending Prescriptions Disp Refills     metFORMIN (GLUCOPHAGE) 500 MG tablet  Last Written Prescription Date:  12/12/2017  Last Fill Quantity: 60,  # refills: 1   Last office visit: No previous visit found with prescribing provider:  Dr Johnson   Future Office Visit:     60 tablet 1     Sig: Take 1 tablet (500 mg) by mouth 2 times daily (with meals)    Biguanide Agents Failed    2/28/2018  9:15 AM       Failed - Patient has documented A1c within the specified period of time.    Recent Labs   Lab Test  05/17/17   0927   A1C  5.4            Passed - Blood pressure less than 140/90 in past 6 months    BP Readings from Last 3 Encounters:   02/27/18 114/76   12/12/17 124/80   05/17/17 130/70                Passed - Patient has documented LDL within the past 12 mos.    Recent Labs   Lab Test  12/12/17   0755   LDL  87            Passed - Patient has had a Microalbumin in the past 12 mos.    Recent Labs   Lab Test  05/17/17   0927   MICROL  13   UMALCR  7.24            Passed - Patient is age 10 or older       Passed - Patient's CR is NOT>1.4 OR Patient's EGFR is NOT<45 within past 12 mos.    Recent Labs   Lab Test  12/12/17   0755   GFRESTIMATED  80   GFRESTBLACK  >90       Recent Labs   Lab Test  12/12/17   0755   CR  1.00            Passed - Patient does NOT have a diagnosis of CHF.       Passed - Recent (6 mos) or future visit with authorizing provider's specialty    Patient had office visit in the last 6 months or has a visit in the next 30 days with authorizing provider.  See \"Patient Info\" tab in inbasket, or \"Choose Columns\" in Meds & Orders section of the refill encounter.            Olmesartan-Amlodipine-HCTZ 40-5-25 MG TABS  Last Written Prescription Date:  12/12/2017  Last Fill Quantity: 30,  # refills: 1   Last office visit: No previous visit found with prescribing provider:  Dr Johnson   Future Office Visit:     30 tablet 1     Sig: Take 1 tablet by mouth daily    Angiotensin-II " "Receptors Passed    2/28/2018  9:15 AM       Passed - Blood pressure under 140/90 in past 12 months    BP Readings from Last 3 Encounters:   02/27/18 114/76   12/12/17 124/80   05/17/17 130/70                Passed - Recent or future visit with authorizing provider's specialty    Patient had office visit in the last year or has a visit in the next 30 days with authorizing provider.  See \"Patient Info\" tab in inbasket, or \"Choose Columns\" in Meds & Orders section of the refill encounter.            Passed - Patient is age 18 or older       Passed - Normal serum creatinine on file in past 12 months    Recent Labs   Lab Test  12/12/17   0755   CR  1.00            Passed - Normal serum potassium on file in past 12 months    Recent Labs   Lab Test  12/12/17   0755   POTASSIUM  3.8                      "

## 2018-03-01 NOTE — TELEPHONE ENCOUNTER
"Requested Prescriptions   Pending Prescriptions Disp Refills     metFORMIN (GLUCOPHAGE) 500 MG tablet [Pharmacy Med Name: METFORMIN 500MG TABLETS] 60 tablet 0     Sig: TAKE 1 TABLET(500 MG) BY MOUTH TWICE DAILY WITH MEALS    Biguanide Agents Failed    2/28/2018  6:01 PM       Failed - Patient has documented A1c within the specified period of time.    Recent Labs   Lab Test  05/17/17 0927   A1C  5.4            Passed - Blood pressure less than 140/90 in past 6 months    BP Readings from Last 3 Encounters:   02/27/18 114/76   12/12/17 124/80   05/17/17 130/70                Passed - Patient has documented LDL within the past 12 mos.    Recent Labs   Lab Test  12/12/17   0755   LDL  87            Passed - Patient has had a Microalbumin in the past 12 mos.    Recent Labs   Lab Test  05/17/17 0927   MICROL  13   UMALCR  7.24            Passed - Patient is age 10 or older       Passed - Patient's CR is NOT>1.4 OR Patient's EGFR is NOT<45 within past 12 mos.    Recent Labs   Lab Test  12/12/17   0755   GFRESTIMATED  80   GFRESTBLACK  >90       Recent Labs   Lab Test  12/12/17   0755   CR  1.00            Passed - Patient does NOT have a diagnosis of CHF.       Passed - Recent (6 mos) or future visit with authorizing provider's specialty    Patient had office visit in the last 6 months or has a visit in the next 30 days with authorizing provider.  See \"Patient Info\" tab in inbasket, or \"Choose Columns\" in Meds & Orders section of the refill encounter.            Prescription approved per [facility] Refill Protocol.    "

## 2018-03-01 NOTE — PATIENT INSTRUCTIONS
We will check uric acid level today.  I started him on allopurinol 100 mg daily for a weeks then 200 mg daily for a week then advance to 300 mg daily.  We will follow-up in about 6 weeks with a uric acid level.  Other labs were also drawn as they were doing including his metabolic panel, his hemoglobin A1c and his lipid panel.  Uric acid level was checked as noted.  I gave the patient a list of dietary issues for gout.  We did discuss possibly removing his hydrochlorothiazide from his blood pressure regimen.  I did not do that today waiting to see whether or not he gets any further issues with his gout.  I gave him a prescription for colchicine.  Will see back as needed.  Gout Diet  Gout is a painful condition caused by an excess of uric acid, a waste product made by the body. Uric acid forms crystals that collect in the joints. The immune response to these crystals brings on symptoms of joint pain and swelling. This is called a gout attack. Often, medications and diet changes are combined to manage gout. Below are some guidelines for changing your diet to help you manage gout and prevent attacks. Your health care provider will help you determine the best eating plan for you.     Eating to manage gout  Weight loss for those who are overweight may help reduce gout attacks.  Eat less of these foods  Eating too many foods containing purines may raise the levels of uric acid in your body. This raises your risk for a gout attack. Try to limit these foods and drinks:    Alcohol, such as beer and red wine. You may be told to avoid alcohol completely.    Soft drinks that contain sugar or high fructose corn syrup    Certain fish, including anchovies, sardines, fish eggs, and herring    Shellfish    Certain meats, such as red meat, hot dogs, luncheon meats, and turkey    Organ meats, such as liver, kidneys, and sweetbreads    Legumes, such as dried beans and peas    Other high fat foods such as gravy, whole milk, and high fat  cheeses    Vegetables such as asparagus, cauliflower, spinach, and mushrooms used to be thought to contribute to an increased risk for a gout attack, but recent studies show that high purine vegetables don't increase the risk for a gout attack.  Eat more of these foods  Other foods may be helpful for people with gout. Add some of these foods to your diet:    Cherries contain chemicals that may lower uric acid.    Omega fatty acids. These are found in some fatty fish such as salmon, certain oils (flax, olive, or nut), and nuts themselves. Omega fatty acids may help prevent inflammation due to gout.    Dairy products that are low-fat or fat-free, such as cheese and yogurt    Complex carbohydrate foods, including whole grains, brown rice, oats, and beans    Coffee, in moderation    Water, approximately 64 ounces per day  Follow-up care  Follow up with your healthcare provider as advised.  When to seek medical advice  Call your healthcare provider right away if any of these occur:    Return of gout symptoms, usually at night:    Severe pain, swelling, and heat in a joint, especially the base of the big toe    Affected joint is hard to move    Skin of the affected joint is purple or red    Fever of 100.4 F (38 C) or higher    Pain that doesn't get better even with prescribed medicine   Date Last Reviewed: 1/12/2016 2000-2017 [employer]. [address] [place] [address], [place] [address]. All rights reserved. This information is not intended as a substitute for professional medical care. Always follow your healthcare professional's instructions.

## 2018-04-23 DIAGNOSIS — R73.01 IMPAIRED FASTING GLUCOSE: ICD-10-CM

## 2018-04-23 NOTE — TELEPHONE ENCOUNTER
Reason for Call:  Medication or medication refill:    Do you use a [facility]?  Name of the pharmacy and phone number for the current request:  [facility] [facility] [phone]    Name of the medication requested: metFORMIN (GLUCOPHAGE) 500 MG tablet    Other request: Patient states he has 2 days worth of pills left.    Can we leave a detailed message on this number? YES    Phone number patient can be reached at: Home number on file [phone] (home)    Best Time:     Call taken on 4/23/2018 at 1:43 PM by KATRIN DODD

## 2018-04-23 NOTE — TELEPHONE ENCOUNTER
"Medication is being filled for 1 time refill only due to:  Patient needs labs okay for lab only appointment.      Last Written Prescription Date:  3/1/18  Last Fill Quantity: 60,  # refills: 4   Last office visit: 2/27/2018 with prescribing provider:  Dr. Johnson.    Future Office Visit:      Requested Prescriptions   Pending Prescriptions Disp Refills     metFORMIN (GLUCOPHAGE) 500 MG tablet 60 tablet 4     Sig: Take 1 tablet (500 mg) by mouth 2 times daily (with meals)    Biguanide Agents Failed    4/23/2018  1:44 PM       Failed - Patient has documented A1c within the specified period of time.    Recent Labs   Lab Test  05/17/17   0927   A1C  5.4            Passed - Blood pressure less than 140/90 in past 6 months    BP Readings from Last 3 Encounters:   02/27/18 114/76   12/12/17 124/80   05/17/17 130/70                Passed - Patient has documented LDL within the past 12 mos.    Recent Labs   Lab Test  12/12/17   0755   LDL  87            Passed - Patient has had a Microalbumin in the past 12 mos.    Recent Labs   Lab Test  05/17/17   0927   MICROL  13   UMALCR  7.24            Passed - Patient is age 10 or older       Passed - Patient's CR is NOT>1.4 OR Patient's EGFR is NOT<45 within past 12 mos.    Recent Labs   Lab Test  12/12/17   0755   GFRESTIMATED  80   GFRESTBLACK  >90       Recent Labs   Lab Test  12/12/17   0755   CR  1.00            Passed - Patient does NOT have a diagnosis of CHF.       Passed - Recent (6 mo) or future (30 days) visit within the authorizing provider's specialty    Patient had office visit in the last 6 months or has a visit in the next 30 days with authorizing provider or within the authorizing provider's specialty.  See \"Patient Info\" tab in inbasket, or \"Choose Columns\" in Meds & Orders section of the refill encounter.                "

## 2018-06-08 DIAGNOSIS — I10 ESSENTIAL HYPERTENSION: ICD-10-CM

## 2018-06-08 DIAGNOSIS — M1A.0790 IDIOPATHIC CHRONIC GOUT OF FOOT WITHOUT TOPHUS, UNSPECIFIED LATERALITY: ICD-10-CM

## 2018-06-08 DIAGNOSIS — E78.5 HYPERLIPIDEMIA LDL GOAL <100: ICD-10-CM

## 2018-06-08 LAB
ALBUMIN SERPL-MCNC: 4.3 G/DL (ref 3.4–5)
ALP SERPL-CCNC: 90 U/L (ref 40–150)
ALT SERPL W P-5'-P-CCNC: 81 U/L (ref 0–70)
ANION GAP SERPL CALCULATED.3IONS-SCNC: 8 MMOL/L (ref 3–14)
AST SERPL W P-5'-P-CCNC: 38 U/L (ref 0–45)
BILIRUB SERPL-MCNC: 0.6 MG/DL (ref 0.2–1.3)
BUN SERPL-MCNC: 23 MG/DL (ref 7–30)
CALCIUM SERPL-MCNC: 8.9 MG/DL (ref 8.5–10.1)
CHLORIDE SERPL-SCNC: 104 MMOL/L (ref 94–109)
CHOLEST SERPL-MCNC: 137 MG/DL
CO2 SERPL-SCNC: 26 MMOL/L (ref 20–32)
CREAT SERPL-MCNC: 1.02 MG/DL (ref 0.66–1.25)
GFR SERPL CREATININE-BSD FRML MDRD: 77 ML/MIN/1.7M2
GLUCOSE SERPL-MCNC: 113 MG/DL (ref 70–99)
HBA1C MFR BLD: 5.4 % (ref 0–5.6)
HDLC SERPL-MCNC: 18 MG/DL
LDLC SERPL CALC-MCNC: 87 MG/DL
NONHDLC SERPL-MCNC: 119 MG/DL
POTASSIUM SERPL-SCNC: 3.9 MMOL/L (ref 3.4–5.3)
PROT SERPL-MCNC: 7.8 G/DL (ref 6.8–8.8)
SODIUM SERPL-SCNC: 138 MMOL/L (ref 133–144)
TRIGL SERPL-MCNC: 162 MG/DL
URATE SERPL-MCNC: 6.7 MG/DL (ref 3.5–7.2)

## 2018-06-08 PROCEDURE — 80053 COMPREHEN METABOLIC PANEL: CPT | Performed by: FAMILY MEDICINE

## 2018-06-08 PROCEDURE — 80061 LIPID PANEL: CPT | Performed by: FAMILY MEDICINE

## 2018-06-08 PROCEDURE — 36415 COLL VENOUS BLD VENIPUNCTURE: CPT | Performed by: FAMILY MEDICINE

## 2018-06-08 PROCEDURE — 84550 ASSAY OF BLOOD/URIC ACID: CPT | Performed by: FAMILY MEDICINE

## 2018-06-08 PROCEDURE — 83036 HEMOGLOBIN GLYCOSYLATED A1C: CPT | Performed by: FAMILY MEDICINE

## 2018-06-12 ENCOUNTER — OFFICE VISIT (OUTPATIENT)
Dept: FAMILY MEDICINE | Facility: CLINIC | Age: 48
End: 2018-06-12
Payer: COMMERCIAL

## 2018-06-12 VITALS
RESPIRATION RATE: 16 BRPM | HEART RATE: 68 BPM | SYSTOLIC BLOOD PRESSURE: 116 MMHG | WEIGHT: 260 LBS | BODY MASS INDEX: 40.81 KG/M2 | DIASTOLIC BLOOD PRESSURE: 78 MMHG | HEIGHT: 67 IN

## 2018-06-12 DIAGNOSIS — M1A.0790 IDIOPATHIC CHRONIC GOUT OF FOOT WITHOUT TOPHUS, UNSPECIFIED LATERALITY: ICD-10-CM

## 2018-06-12 DIAGNOSIS — R73.01 IMPAIRED FASTING GLUCOSE: ICD-10-CM

## 2018-06-12 DIAGNOSIS — I10 ESSENTIAL HYPERTENSION: Primary | ICD-10-CM

## 2018-06-12 PROCEDURE — 99214 OFFICE O/P EST MOD 30 MIN: CPT | Performed by: FAMILY MEDICINE

## 2018-06-12 RX ORDER — ALLOPURINOL 300 MG/1
300 TABLET ORAL DAILY
Qty: 90 TABLET | Refills: 1 | Status: SHIPPED | OUTPATIENT
Start: 2018-06-12 | End: 2018-11-24

## 2018-06-12 RX ORDER — OLMESARTAN MEDOXOMIL, AMLODIPINE AND HYDROCHLOROTHIAZIDE TABLET 40/5/25 MG 40; 5; 25 MG/1; MG/1; MG/1
1 TABLET ORAL DAILY
Qty: 90 TABLET | Refills: 1 | Status: SHIPPED | OUTPATIENT
Start: 2018-06-12 | End: 2018-11-24

## 2018-06-12 NOTE — MR AVS SNAPSHOT
After Visit Summary   6/12/2018    Waylon Santiago    MRN: [id number]           Patient Information     Date Of Birth          1970        Visit Information        Provider Department      6/12/2018 2:00 PM Maynor Johnson MD [facility]        Today's Diagnoses     Essential hypertension    -  1    Idiopathic chronic gout of foot without tophus, unspecified laterality        Impaired fasting glucose          Care Instructions    I will see the patient back in 2 months.  He will need lab testing done at that time.  I refilled his allopurinol, metformin and blood pressure medications all for 90 days.  Follow-up otherwise will be as needed.          Follow-ups after your visit        Your next 10 appointments already scheduled     Sep 11, 2018  8:15 AM CDT   LAB with [facility]   [facility] ([facility])    [address] [place] [id number]   [phone]           Please do not eat 10-12 hours before your appointment if you are coming in fasting for labs on lipids, cholesterol, or glucose (sugar). This does not apply to pregnant women. Water, hot tea and black coffee (with nothing added) are okay. Do not drink other fluids, diet soda or chew gum.            Sep 17, 2018  4:30 PM CDT   SHORT with Maynor Johnson MD   [facility]    [address] [place] [id number]   [phone]              Future tests that were ordered for you today     Open Future Orders        Priority Expected Expires Ordered    Uric acid Routine  9/15/2018 6/12/2018    Basic metabolic panel Routine  9/15/2018 6/12/2018    Lipid Profile Routine  9/15/2018 6/12/2018            Who to contact     If you have questions or need follow up information about today's clinic visit or your schedule please  "contact [facility] directly at [phone].  Normal or non-critical lab and imaging results will be communicated to you by MyChart, letter or phone within 4 business days after the clinic has received the results. If you do not hear from us within 7 days, please contact the clinic through MyChart or phone. If you have a critical or abnormal lab result, we will notify you by phone as soon as possible.  Submit refill requests through [employer] or call your pharmacy and they will forward the refill request to us. Please allow 3 business days for your refill to be completed.          Additional Information About Your Visit        Care EveryWhere ID     This is your Care EveryWhere ID. This could be used by other organizations to access your [facility] medical records  [id number]        Your Vitals Were     Pulse Respirations Height BMI (Body Mass Index)          68 16 5' 6.5\" (1.689 m) 41.34 kg/m2         Blood Pressure from Last 3 Encounters:   06/12/18 116/78   02/27/18 114/76   12/12/17 124/80    Weight from Last 3 Encounters:   06/12/18 260 lb (117.9 kg)   02/27/18 268 lb (121.6 kg)   12/12/17 268 lb (121.6 kg)                 Today's Medication Changes          These changes are accurate as of 6/12/18  3:12 PM.  If you have any questions, ask your nurse or doctor.               These medicines have changed or have updated prescriptions.        Dose/Directions    allopurinol 300 MG tablet   Commonly known as:  ZYLOPRIM   This may have changed:  Another medication with the same name was removed. Continue taking this medication, and follow the directions you see here.   Used for:  Idiopathic chronic gout of foot without tophus, unspecified laterality   Changed by:  Maynor Johnson MD        Dose:  300 mg   Take 1 tablet (300 mg) by mouth daily   Quantity:  90 tablet   Refills:  1            Where to get your medicines      These medications were sent to [id number] - " [place], [place] - [address] AT [facility] [place] [address] [place]  [address], [facility] [address]     Phone:  [phone]     allopurinol 300 MG tablet    metFORMIN 500 MG tablet    Olmesartan-Amlodipine-HCTZ 40-5-25 MG Tabs                Primary Care Provider Office Phone # Fax #    Maynor Johnson [phone]       [address] [facility] [address]        Equal Access to Services     [facility] : Hadii aad ku hadasho Soomaali, waaxda luqadaha, qaybta kaalmada adeegyada, waxay idiin hayaan adeeg devonaramathew laian . So [facility] [phone].    ATENCIÓN: Si habla español, tiene a lindsay disposición servicios gratuitos de asistencia lingüística. Ll[facility] [phone].    We comply with applicable federal civil rights laws and [place] laws. We do not discriminate on the basis of race, color, national origin, age, disability, sex, sexual orientation, or gender identity.            Thank you!     Thank you for choosing [facility]  for your care. Our goal is always to provide you with excellent care. Hearing back from our patients is one way we can continue to improve our services. Please take a few minutes to complete the written survey that you may receive in the mail after your visit with us. Thank you!             Your Updated Medication List - Protect others around you: Learn how to safely use, store and throw away your medicines at www.disposemymeds.org.          This list is accurate as of 6/12/18  3:12 PM.  Always use your most recent med list.                   Brand Name Dispense Instructions for use Diagnosis    allopurinol 300 MG tablet    ZYLOPRIM    90 tablet    Take 1 tablet (300 mg) by mouth daily    Idiopathic chronic gout of foot without tophus, unspecified laterality       aspirin 81 MG tablet     30 tablet    Take 1 tablet (81 mg) by mouth daily    Hypertension goal BP (blood pressure) < 140/90       colchicine 0.6 MG  tablet    COLCRYS    30 tablet    2 tabs at the onset of flare, then 1 tab every 2 hrs until pain is better or diarrhea occurs, up to 10 doses. Wait 3 days until taking again    Idiopathic chronic gout of foot without tophus, unspecified laterality       metFORMIN 500 MG tablet    GLUCOPHAGE    180 tablet    Take 1 tablet (500 mg) by mouth 2 times daily (with meals)    Impaired fasting glucose       Olmesartan-Amlodipine-HCTZ 40-5-25 MG Tabs     90 tablet    Take 1 tablet by mouth daily

## 2018-06-12 NOTE — PATIENT INSTRUCTIONS
I will see the patient back in 2 months.  He will need lab testing done at that time.  I refilled his allopurinol, metformin and blood pressure medications all for 90 days.  Follow-up otherwise will be as needed.

## 2018-07-20 DIAGNOSIS — M1A.0790 IDIOPATHIC CHRONIC GOUT OF FOOT WITHOUT TOPHUS, UNSPECIFIED LATERALITY: ICD-10-CM

## 2018-07-20 RX ORDER — ALLOPURINOL 300 MG/1
TABLET ORAL
Qty: 30 TABLET | Refills: 3 | Status: SHIPPED | OUTPATIENT
Start: 2018-07-20 | End: 2018-08-22

## 2018-07-20 NOTE — TELEPHONE ENCOUNTER
"ALLOPURINOL 300MG TABLETS  Last Written Prescription Date:  06/12/18  Last Fill Quantity: 90,  # refills: 1   Last office visit: 6/12/2018 with prescribing provider:  06/21/18   Future Office Visit:   Next 5 appointments (look out 90 days)     Sep 17, 2018  4:30 PM CDT   SHORT with Maynor Johnson MD   [facility]    [address] [place] [id number] [phone]                Requested Prescriptions   Pending Prescriptions Disp Refills     allopurinol (ZYLOPRIM) 300 MG tablet [Pharmacy Med Name: ALLOPURINOL 300MG TABLETS] 30 tablet 0     Sig: TAKE 1 TABLET(300 MG) BY MOUTH DAILY    Gout Agents Protocol Failed    7/20/2018  9:39 AM       Failed - Has Uric Acid on file in past 12 months and value is less than 6    Recent Labs   Lab Test  06/08/18 0912   URIC  6.7     If level is 6mg/dL or greater, ok to refill one time and refer to provider.          Passed - CBC on file in past 12 months    Recent Labs   Lab Test  12/12/17   0755   WBC  6.8   RBC  4.57   HGB  14.9   HCT  43.7   PLT  228       For [facility] ONLY: [id number] = WBC, [id number] = RBC         Passed - ALT on file in past 12 months    Recent Labs   Lab Test  06/08/18 0912   ALT  81*            Passed - Recent (12 mo) or future (30 days) visit within the authorizing provider's specialty    Patient had office visit in the last 12 months or has a visit in the next 30 days with authorizing provider or within the authorizing provider's specialty.  See \"Patient Info\" tab in inbasket, or \"Choose Columns\" in Meds & Orders section of the refill encounter.           Passed - Patient is age 18 or older       Passed - Normal serum creatinine on file in the past 12 months    Recent Labs   Lab Test  06/08/18   0912   CR  1.02              "

## 2018-08-22 DIAGNOSIS — R73.01 IMPAIRED FASTING GLUCOSE: Primary | ICD-10-CM

## 2018-09-11 DIAGNOSIS — M1A.0790 IDIOPATHIC CHRONIC GOUT OF FOOT WITHOUT TOPHUS, UNSPECIFIED LATERALITY: ICD-10-CM

## 2018-09-11 LAB
ANION GAP SERPL CALCULATED.3IONS-SCNC: 3 MMOL/L (ref 3–14)
BUN SERPL-MCNC: 19 MG/DL (ref 7–30)
CALCIUM SERPL-MCNC: 8.8 MG/DL (ref 8.5–10.1)
CHLORIDE SERPL-SCNC: 107 MMOL/L (ref 94–109)
CHOLEST SERPL-MCNC: 129 MG/DL
CO2 SERPL-SCNC: 32 MMOL/L (ref 20–32)
CREAT SERPL-MCNC: 0.97 MG/DL (ref 0.66–1.25)
GFR SERPL CREATININE-BSD FRML MDRD: 82 ML/MIN/1.7M2
GLUCOSE SERPL-MCNC: 98 MG/DL (ref 70–99)
HDLC SERPL-MCNC: 15 MG/DL
LDLC SERPL CALC-MCNC: 51 MG/DL
NONHDLC SERPL-MCNC: 114 MG/DL
POTASSIUM SERPL-SCNC: 3.8 MMOL/L (ref 3.4–5.3)
SODIUM SERPL-SCNC: 142 MMOL/L (ref 133–144)
TRIGL SERPL-MCNC: 315 MG/DL
URATE SERPL-MCNC: 5.6 MG/DL (ref 3.5–7.2)

## 2018-09-11 PROCEDURE — 36415 COLL VENOUS BLD VENIPUNCTURE: CPT | Performed by: FAMILY MEDICINE

## 2018-09-11 PROCEDURE — 84550 ASSAY OF BLOOD/URIC ACID: CPT | Performed by: FAMILY MEDICINE

## 2018-09-11 PROCEDURE — 80061 LIPID PANEL: CPT | Performed by: FAMILY MEDICINE

## 2018-09-11 PROCEDURE — 80048 BASIC METABOLIC PNL TOTAL CA: CPT | Performed by: FAMILY MEDICINE

## 2018-09-17 ENCOUNTER — OFFICE VISIT (OUTPATIENT)
Dept: FAMILY MEDICINE | Facility: CLINIC | Age: 48
End: 2018-09-17
Payer: COMMERCIAL

## 2018-09-17 VITALS
WEIGHT: 253 LBS | BODY MASS INDEX: 40.22 KG/M2 | HEART RATE: 94 BPM | SYSTOLIC BLOOD PRESSURE: 120 MMHG | DIASTOLIC BLOOD PRESSURE: 70 MMHG | RESPIRATION RATE: 16 BRPM | TEMPERATURE: 98 F

## 2018-09-17 DIAGNOSIS — M1A.0790 IDIOPATHIC CHRONIC GOUT OF FOOT WITHOUT TOPHUS, UNSPECIFIED LATERALITY: ICD-10-CM

## 2018-09-17 DIAGNOSIS — E78.5 HYPERLIPIDEMIA LDL GOAL <100: ICD-10-CM

## 2018-09-17 DIAGNOSIS — M25.571 BILATERAL ANKLE PAIN, UNSPECIFIED CHRONICITY: Primary | ICD-10-CM

## 2018-09-17 DIAGNOSIS — I10 ESSENTIAL HYPERTENSION: ICD-10-CM

## 2018-09-17 DIAGNOSIS — M25.572 BILATERAL ANKLE PAIN, UNSPECIFIED CHRONICITY: Primary | ICD-10-CM

## 2018-09-17 PROCEDURE — 99214 OFFICE O/P EST MOD 30 MIN: CPT | Performed by: FAMILY MEDICINE

## 2018-09-17 NOTE — PROGRESS NOTES
SUBJECTIVE:   Waylon Santiago is a 47 year old male who presents to clinic today for the following health issues:        Hyperlipidemia Follow-Up      Rate your low fat/cholesterol diet?: good    Taking statin?  Yes, no muscle aches from statin    Other lipid medications/supplements?:  none    Hypertension Follow-up      Outpatient blood pressures are not being checked.    Low Salt Diet: not monitoring salt    BP Readings from Last 2 Encounters:   09/17/18 120/70   06/12/18 116/78     Hemoglobin A1C (%)   Date Value   06/08/2018 5.4   05/17/2017 5.4     LDL Cholesterol Calculated (mg/dL)   Date Value   09/11/2018 51   06/08/2018 87       Amount of exercise or physical activity: None    Problems taking medications regularly: No    Medication side effects: GI distress    Diet: regular (no restrictions)        Musculoskeletal problem/pain      Duration: Off and on for months     Description  Location: Both feet and ankles    Intensity:  moderate    Accompanying signs and symptoms: none    History  Previous similar problem: YES    Previous evaluation:  x-ray and orthopedic evaluation at [facility]     precipitating or alleviating factors:  Trauma or overuse: YES-overuse and walking up and down hills can aggravate this.  Aggravating factors include: overuse    Therapies tried and outcome: The patient also has gout and has tried colchicine on occasion for the ankle pain when it flares up.  Once or twice that has actually worked very well however, there are other times when it does not work at all.  He has been seen by [facility] orthopedics.      Problem list and histories reviewed & adjusted, as indicated.  Additional history: as documented    Patient Active Problem List   Diagnosis     Essential hypertension     BMI of 40.0-44.9, adult     Hyperlipidemia LDL goal <100     Impaired fasting glucose     Abnormal results of liver function studies     Gout     Preventive measure     Morbid obesity (H)     Lipoma of skin and  subcutaneous tissue     History reviewed. No pertinent surgical history.    Social History   Substance Use Topics     Smoking status: Never Smoker     Smokeless tobacco: Never Used     Alcohol use Yes      Comment: occ.     Family History   Problem Relation Age of Onset     Diabetes Mother      Family History Negative Father      health hx unknown; smoker           Reviewed and updated as needed this visit by clinical staff  Tobacco  Allergies  Meds  Med Hx  Surg Hx  Fam Hx  Soc Hx      Reviewed and updated as needed this visit by Provider         ROS:  Constitutional, HEENT, cardiovascular, pulmonary, gi and gu systems are negative, except as otherwise noted.    OBJECTIVE:                                                    /70 (Cuff Size: Adult Large)  Pulse 94  Temp 98  F (36.7  C) (Tympanic)  Resp 16  Wt 253 lb (114.8 kg)  BMI 40.22 kg/m2  Body mass index is 40.22 kg/(m^2).  GENERAL APPEARANCE: healthy, alert, no distress and over weight    Diagnostic test results:  Results for orders placed or performed in visit on 09/11/18   Uric acid   Result Value Ref Range    Uric Acid 5.6 3.5 - 7.2 mg/dL   Basic metabolic panel   Result Value Ref Range    Sodium 142 133 - 144 mmol/L    Potassium 3.8 3.4 - 5.3 mmol/L    Chloride 107 94 - 109 mmol/L    Carbon Dioxide 32 20 - 32 mmol/L    Anion Gap 3 3 - 14 mmol/L    Glucose 98 70 - 99 mg/dL    Urea Nitrogen 19 7 - 30 mg/dL    Creatinine 0.97 0.66 - 1.25 mg/dL    GFR Estimate 82 >60 mL/min/1.7m2    GFR Estimate If Black >90 >60 mL/min/1.7m2    Calcium 8.8 8.5 - 10.1 mg/dL   Lipid Profile   Result Value Ref Range    Cholesterol 129 <200 mg/dL    Triglycerides 315 (H) <150 mg/dL    HDL Cholesterol 15 (L) >39 mg/dL    LDL Cholesterol Calculated 51 <100 mg/dL    Non HDL Cholesterol 114 <130 mg/dL        ASSESSMENT/PLAN:                                                        ICD-10-CM    1. Bilateral ankle pain, unspecified chronicity M25.571 PODIATRY/FOOT & ANKLE  SURGERY REFERRAL    M25.572    2. Idiopathic chronic gout of foot without tophus, unspecified laterality M1A.0722    3. Hyperlipidemia LDL goal <100 E78.5 ALT     Lipid panel reflex to direct LDL Fasting   4. Essential hypertension I10      Return in about 3 months (around 12/17/2018) for hypertension, Lab Work.  Patient Instructions   I referred the patient to podiatry for a second opinion about his ankle pains.  It sounds like he actually does have a couple different problems going on with the pain.  Sounds like 1 or 2 episodes of the pains have been actually flares of gout and the other ones have not been since it did not respond to colchicine.  His uric acid has come down from 12.9 in December 2 now down to 5.9.  His weight has gone from 270  Down to 253 and he is continuing to work on getting his weight down.    We again had a discussion about triglycerides and HDL cholesterol.  He is trying to increase his exercise and he has been watching his diet fairly closely.  We will check those tests again when he returns in 3 months.      Maynor Johnson MD  [facility]

## 2018-09-17 NOTE — MR AVS SNAPSHOT
After Visit Summary   9/17/2018    Waylon Santiago    MRN: [id number]           Patient Information     Date Of Birth          1970        Visit Information        Provider Department      9/17/2018 4:30 PM Maynor Johnson MD [facility]        Today's Diagnoses     Bilateral ankle pain, unspecified chronicity    -  1    Idiopathic chronic gout of foot without tophus, unspecified laterality        Hyperlipidemia LDL goal <100        Essential hypertension          Care Instructions    I referred the patient to podiatry for a second opinion about his ankle pains.  It sounds like he actually does have a couple different problems going on with the pain.  Sounds like 1 or 2 episodes of the pains have been actually flares of gout and the other ones have not been since it did not respond to colchicine.  His uric acid has come down from 12.9 in December 2 now down to 5.9.  His weight has gone from 270  Down to 253 and he is continuing to work on getting his weight down.    We again had a discussion about triglycerides and HDL cholesterol.  He is trying to increase his exercise and he has been watching his diet fairly closely.  We will check those tests again when he returns in 3 months.          Follow-ups after your visit        Additional Services     PODIATRY/FOOT & ANKLE SURGERY REFERRAL       Your provider has referred you to: [facility]: [facility] [phone]   [email or web address]    Please be aware that coverage of these services is subject to the terms and limitations of your health insurance plan.  Call member services at your health plan with any benefit or coverage questions.      Please bring the following to your appointment:  >>   Any x-rays, CTs or MRIs which have been performed.  Contact the facility where they were done to arrange for  prior to your scheduled appointment.    >>    List of current medications   >>   This referral request   >>   Any documents/labs given to you for this referral                  Follow-up notes from your care team     Return in about 3 months (around 12/17/2018) for hypertension, Lab Work.      Your next 10 appointments already scheduled     Nov 28, 2018  8:15 AM CST   LAB with BX LAB   [facility] ([facility]    [address] [place] [id number]   [phone]           Please do not eat 10-12 hours before your appointment if you are coming in fasting for labs on lipids, cholesterol, or glucose (sugar). This does not apply to pregnant women. Water, hot tea and black coffee (with nothing added) are okay. Do not drink other fluids, diet soda or chew gum.            Dec 03, 2018  4:30 PM CST   [facility]   [facility]    [address] [address]   [phone]              Future tests that were ordered for you today     Open Future Orders        Priority Expected Expires Ordered    ALT Routine  12/16/2018 9/17/2018    Lipid panel reflex to direct LDL Fasting Routine  12/16/2018 9/17/2018            Who to contact     If you have questions or need follow up information about today's clinic visit or your schedule please contact [facility] directly at [phone].  Normal or non-critical lab and imaging results will be communicated to you by MyChart, letter or phone within 4 business days after the clinic has received the results. If you do not hear from us within 7 days, please contact the clinic through MyChart or phone. If you have a critical or abnormal lab result, we will notify you by phone as soon as possible.  Submit refill requests through [employer] or call your pharmacy and they will forward the refill  "request to us. Please allow 3 business days for your refill to be completed.          Additional Information About Your Visit        [employer] Information     [employer] lets you send messages to your doctor, view your test results, renew your prescriptions, schedule appointments and more. To sign up, go to [email or web address] . Click on \"Log in\" on the left side of the screen, which will take you to the Welcome page. Then click on \"Sign up Now\" on the right side of the page.     You will be asked to enter the access code listed below, as well as some personal information. Please follow the directions to create your username and password.     Your access code is: [email or web address]  Expires: 12/10/2018  8:06 AM     Your access code will  in 90 days. If you need help or a new code, please call your [facility] clinic or [phone].        Care EveryWhere ID     This is your Care EveryWhere ID. This could be used by other organizations to access your [facility] medical records  [id number]        Your Vitals Were     Pulse Temperature Respirations BMI (Body Mass Index)          94 98  F (36.7  C) (Tympanic) 16 40.22 kg/m2         Blood Pressure from Last 3 Encounters:   18 120/70   18 116/78   18 114/76    Weight from Last 3 Encounters:   18 253 lb (114.8 kg)   18 260 lb (117.9 kg)   18 268 lb (121.6 kg)              We Performed the Following     PODIATRY/FOOT & ANKLE SURGERY REFERRAL          Today's Medication Changes          These changes are accurate as of 18  5:40 PM.  If you have any questions, ask your nurse or doctor.               These medicines have changed or have updated prescriptions.        Dose/Directions    metFORMIN 500 MG tablet   Commonly known as:  GLUCOPHAGE   This may have changed:  when to take this   Used for:  Impaired fasting glucose        Dose:  500 mg   Take 1 tablet (500 mg) by mouth 2 times daily (with meals)   Quantity:  180 tablet   Refills:  1 "                Primary Care Provider Office Phone # Fax #    Maynor Johnson [phone]       [address] [facility] [address]        Equal Access to Services     DORA DIAMOND : Lisa moses manjito Soomaali, waaxda luqadaha, qaybta kaalmada adeegyada, meg ramachandran lakeishagini valero laAlcidesmanuel rivers. So [facility] [phone].    ATENCIÓN: Si habla español, tiene a lindsay disposición servicios gratuitos de asistencia lingüística. Llame al [phone].    We comply with applicable federal civil rights laws and [place] laws. We do not discriminate on the basis of race, color, national origin, age, disability, sex, sexual orientation, or gender identity.            Thank you!     Thank you for choosing [facility]  for your care. Our goal is always to provide you with excellent care. Hearing back from our patients is one way we can continue to improve our services. Please take a few minutes to complete the written survey that you may receive in the mail after your visit with us. Thank you!             Your Updated Medication List - Protect others around you: Learn how to safely use, store and throw away your medicines at www.disposemymeds.org.          This list is accurate as of 9/17/18  5:40 PM.  Always use your most recent med list.                   Brand Name Dispense Instructions for use Diagnosis    allopurinol 300 MG tablet    ZYLOPRIM    90 tablet    Take 1 tablet (300 mg) by mouth daily    Idiopathic chronic gout of foot without tophus, unspecified laterality       aspirin 81 MG tablet     30 tablet    Take 1 tablet (81 mg) by mouth daily    Hypertension goal BP (blood pressure) < 140/90       colchicine 0.6 MG tablet    COLCRYS    30 tablet    2 tabs at the onset of flare, then 1 tab every 2 hrs until pain is better or diarrhea occurs, up to 10 doses. Wait 3 days until taking again    Idiopathic chronic gout of foot without tophus, unspecified laterality        metFORMIN 500 MG tablet    GLUCOPHAGE    180 tablet    Take 1 tablet (500 mg) by mouth 2 times daily (with meals)    Impaired fasting glucose       Olmesartan-Amlodipine-HCTZ 40-5-25 MG Tabs     90 tablet    Take 1 tablet by mouth daily        STATIN NOT PRESCRIBED (INTENTIONAL)      Please choose reason not prescribed, below    Impaired fasting glucose

## 2018-09-17 NOTE — PATIENT INSTRUCTIONS
I referred the patient to podiatry for a second opinion about his ankle pains.  It sounds like he actually does have a couple different problems going on with the pain.  Sounds like 1 or 2 episodes of the pains have been actually flares of gout and the other ones have not been since it did not respond to colchicine.  His uric acid has come down from 12.9 in December 2 now down to 5.9.  His weight has gone from 270  Down to 253 and he is continuing to work on getting his weight down.    We again had a discussion about triglycerides and HDL cholesterol.  He is trying to increase his exercise and he has been watching his diet fairly closely.  We will check those tests again when he returns in 3 months.

## 2018-11-19 ENCOUNTER — RADIANT APPOINTMENT (OUTPATIENT)
Dept: GENERAL RADIOLOGY | Facility: CLINIC | Age: 48
End: 2018-11-19
Attending: PODIATRIST
Payer: COMMERCIAL

## 2018-11-19 ENCOUNTER — OFFICE VISIT (OUTPATIENT)
Dept: PODIATRY | Facility: CLINIC | Age: 48
End: 2018-11-19
Payer: COMMERCIAL

## 2018-11-19 VITALS
DIASTOLIC BLOOD PRESSURE: 72 MMHG | SYSTOLIC BLOOD PRESSURE: 109 MMHG | HEIGHT: 67 IN | WEIGHT: 257 LBS | BODY MASS INDEX: 40.34 KG/M2

## 2018-11-19 DIAGNOSIS — M79.672 BILATERAL FOOT PAIN: ICD-10-CM

## 2018-11-19 DIAGNOSIS — M19.079 ARTHRITIS, MIDFOOT: Primary | ICD-10-CM

## 2018-11-19 DIAGNOSIS — M1A.0790 CHRONIC GOUT OF FOOT, UNSPECIFIED CAUSE, UNSPECIFIED LATERALITY: ICD-10-CM

## 2018-11-19 DIAGNOSIS — M79.671 BILATERAL FOOT PAIN: ICD-10-CM

## 2018-11-19 PROCEDURE — 73630 X-RAY EXAM OF FOOT: CPT | Mod: RT

## 2018-11-19 PROCEDURE — 99203 OFFICE O/P NEW LOW 30 MIN: CPT | Performed by: PODIATRIST

## 2018-11-19 NOTE — LETTER
"    2018         RE: Waylon Santiago  [address] [facility] [address]        Dear Colleague,    Thank you for referring your patient, Waylon Santiago, to the [facility]. Please see a copy of my visit note below.      ASSESSMENT/PLAN:    Encounter Diagnoses   Name Primary?     Bilateral foot pain      Arthritis, midfoot Yes     Chronic gout of foot, unspecified cause, unspecified laterality      I think his foot pain is likely both mechanical (midfoot arthritis, higher arch, body weight) and occasional gout.  The gout is treated.    I reviewed the XR images with the patient.  The relevant anatomy and function was discussed, in relation to the problem.      Pt is referred to the [facility] for prescription orthoses.    I discussed what I consider is an appropriate shoe for his foot type.     Future options discussed:  Repeat MRI, image-guided steroid injection.       Body mass index is 40.86 kg/(m^2).    Weight management plan: Patient was referred to their PCP to discuss a diet and exercise plan.      Stiven Paris DPM, FACFAS, MS    [facility]      ____________________________________________________________________    HPI:       I was asked by Dr. Manyor Johnson to evaluate this patient, in consultation, regarding bilateral foot pain and swelling.   Right foot > left.  Chief Complaint: see above  Onset of problem: 2.5 years  Pain/ discomfort is described as:  Ache, swelling, \"not just gout\"  Ratin-9/10 at worst   Frequency:  Every other month    The pain is made worse with weight bearing activity  Previous treatment: Allopurinol for gout, different shoes    He reports previously being evaluated at [facility]. An MRI was done showing some \"effusion\" in joints.     He also was diagnosed with gout, but the pain he is presenting for he says is different that the gout pain.  He points to the dorsal alteral " foot, 3rd and 4th tarsometarsal joint region.    *  Patient Active Problem List   Diagnosis     Essential hypertension     BMI of 40.0-44.9, adult     Hyperlipidemia LDL goal <100     Impaired fasting glucose     Abnormal results of liver function studies     Gout     Preventive measure     Morbid obesity (H)     Lipoma of skin and subcutaneous tissue   *  *History reviewed. No pertinent surgical history.*  *  Current Outpatient Prescriptions   Medication Sig Dispense Refill     allopurinol (ZYLOPRIM) 300 MG tablet Take 1 tablet (300 mg) by mouth daily 90 tablet 1     aspirin 81 MG tablet Take 1 tablet (81 mg) by mouth daily 30 tablet 11     colchicine (COLCRYS) 0.6 MG tablet 2 tabs at the onset of flare, then 1 tab every 2 hrs until pain is better or diarrhea occurs, up to 10 doses. Wait 3 days until taking again 30 tablet 3     metFORMIN (GLUCOPHAGE) 500 MG tablet Take 1 tablet (500 mg) by mouth 2 times daily (with meals) (Patient taking differently: Take 500 mg by mouth daily (with breakfast) ) 180 tablet 1     Olmesartan-Amlodipine-HCTZ 40-5-25 MG TABS Take 1 tablet by mouth daily 90 tablet 1     STATIN NOT PRESCRIBED, INTENTIONAL, Please choose reason not prescribed, below         ROS:     A 10-point review of systems was performed and is positive for that noted above in the HPI and as seen below.  All other areas are negative.     Numbness in feet?  no   Calf pain with walking? occasional  Recent foot/ankle injury? no  Weight change over past 12 months? 15-20# loss  Self perception as overweight? yes  Recent flu-like symptoms? no  Joint pain other than feet ? no    Social History: Employment:  ;  Exercise/Physical activity:  walking;  Tobacco use:  no  Social History     Social History     Marital status:      Spouse name: N/A     Number of children: N/A     Years of education: N/A     Occupational History     Not on file.     Social History Main Topics     Smoking status: Never Smoker      "Smokeless tobacco: Never Used     Alcohol use Yes      Comment: occ.     Drug use: No     Sexual activity: Yes     Partners: Female     Other Topics Concern     Parent/Sibling W/ Cabg, Mi Or Angioplasty Before 65f 55m? No     Social History Narrative       Family history:  Family History   Problem Relation Age of Onset     Diabetes Mother      Family History Negative Father      health hx unknown; smoker       Rheumatoid arthritis:  grandparent  Foot Problems: no  Diabetes: parent      EXAM:    Vitals: /72 (BP Location: Right arm, Patient Position: Chair, Cuff Size: Adult Large)  Ht 5' 6.5\" (1.689 m)  Wt 257 lb (116.6 kg)  BMI 40.86 kg/m2  BMI: Body mass index is 40.86 kg/(m^2).  Height: 5' 6.5\"    Constitutional/ general:  Pt is in no apparent distress, appears well-nourished.  Cooperative with history and physical exam.     Vascular:  Pedal pulses are palpable bilaterally for both the DP and PT arteries.  CFT < 3 sec.  No edema.  Pedal hair growth noted.     Neuro:  Alert and oriented x 3. Coordinated gait.  Light touch sensation is intact to the L4, L5, S1 distributions. No obvious deficits.  No evidence of neurological-based weakness, spasticity, or contracture in the lower extremities.     Derm: Normal texture and turgor.  No erythema, ecchymosis, or cyanosis.  No open lesions.     Musculoskeletal:    Lower extremity muscle strength is normal.  Patient is ambulatory without an assistive device or brace .  No gross deformities.  High medial longitudinal arches. Pain on palpation: mild tenderness at the 3rd tarsometatarsal joint level.      Radiographic Exam:  X-Ray Findings:  I personally reviewed the right foot films.  Rectus to higher arched foot. No acute findings. ? Narrowing of the 2nd and 3rd tarsometatarsal joint - notably the 3rd.        Stiven Paris, VILMA, FACFAS, MS    [place] [facility]le [facility]                Again, thank you for allowing me to participate in the care " of your patient.        Sincerely,        Stiven Paris, GWENM

## 2018-11-19 NOTE — PATIENT INSTRUCTIONS
Thank you for choosing [facility]!    DR. WIN'S CLINIC LOCATIONS     MONDAY - [place] WEDNESDAY - [place]   [address] [place] [address] [place] [address]   [phone] / [phone] / [phone]       THURSDAY - [place] SCHEDULE SURGERY: [phone]   [address] APPOINTMENTS: [phone]   [place] [address] BILLING QUESTIONS: [phone] / [phone]       [facility] LOCATIONS  [facility]  [address]  [place] [address]  Phone: [phone]  Fax: [phone] [facility]  [address]  [place] [address]  Phone: [phone]   Fax: [phone]   [facility]  [address]  [place] [address]  Phone: [phone]  Fax: [phone] [facility]  [address]  [place] [address]  Phone: [phone]   Fax: [phone]   [facility]   [address]  [place] [address]  Phone: [phone]   Fax: [phone] * Please call any location listed to make an appointment for a casting/fitting. Your referral was sent to their central office and they will all have the order on file.     OSTEOARTHRITIS OF THE FOOT & ANKLE  Osteoarthritis is a condition characterized by the breakdown and eventual loss of cartilage in one or more joints. Cartilage (the connective tissue found at the end of the bones in the joints) protects and cushions the bones during movement. When cartilage deteriorates or is lost, symptoms develop that can restrict one s ability to easily perform daily activities.  Osteoarthritis is also known as degenerative arthritis, reflecting its nature to develop as part of the aging process. As the most common form of arthritis, osteoarthritis affects millions of Americans. Some people refer to osteoarthritis simply  as arthritis, even though there are many different types of arthritis.  Osteoarthritis appears at various joints throughout the body, including the hands, feet, spine, hips, and knees. In the foot, the disease most frequently occurs in the big toe, although it is also often found in the midfoot and ankle.  CAUSES  Osteoarthritis is considered a  wear and tear  disease because the cartilage in the joint wears down with repeated stress and use over time. As the cartilage deteriorates and gets thinner, the bones lose their protective covering and eventually may rub together, causing pain and inflammation of the joint.  An injury may also lead to osteoarthritis, although it may take months or years after the injury for the condition to develop. For example, osteoarthritis in the big toe is often caused by kicking or jamming the toe, or by dropping something on the toe. Osteoarthritis in the midfoot is often caused by dropping something on it, or by a sprain or fracture. In the ankle, osteoarthritis is usually caused by a fracture and occasionally by a severe sprain.  Sometimes osteoarthritis develops as a result of abnormal foot mechanics such as flat feet or high arches. A flat foot causes less stability in the ligaments (bands of tissue that connect bones), resulting in excessive strain on the joints, which can cause arthritis. A high arch is rigid and lacks mobility, causing a jamming of joints that creates an increased risk of arthritis.  SYMPTOMS  People with osteoarthritis in the foot or ankle experience, in varying degrees, one or more of the following: Pain and stiffness in the joint, swelling in or near the joint, or difficulty walking or bending the joint.   Some patients with osteoarthritis also develop a bone spur (a bony protrusion) at the affected joint. Shoe pressure may cause pain at the site of a bone spur, and in some cases blisters or calluses may form over its surface. Bone spurs can also limit the  movement of the joint.    DIAGNOSIS  In diagnosing osteoarthritis, the foot and ankle surgeon will examine the foot thoroughly, looking for swelling in the joint, limited mobility, and pain with movement. In some cases, deformity and/or enlargement (spur) of the joint may be noted. X-rays may be ordered to evaluate the extent of the disease.  NON-SURGICAL TREATMENT  To help relieve symptoms, the surgeon may begin treating osteoarthritis with one or more of the following non-surgical approaches:  Oral medications. Nonsteroidal anti-inflammatory drugs (NSAIDs), such as ibuprofen, are often helpful in reducing the inflammation and pain. Occasionally a prescription for a steroid medication is needed to adequately reduce symptoms.   Orthotic devices. Custom orthotic devices (shoe inserts) are often prescribed to provide support to improve the foot s mechanics or cushioning to help minimize pain.   Bracing. Bracing, which restricts motion and supports the joint, can reduce pain during walking and help prevent further deformity.   Immobilization. Protecting the foot from movement by wearing a cast or removable cast-boot may be necessary to allow the inflammation to resolve.   Steroid injections. In some cases, steroid injections are applied to the affected joint to deliver anti-inflammatory medication.   Physical therapy. Exercises to strengthen the muscles, especially when the osteoarthritis occurs in the ankle, may give the patient greater stability and help avoid injury that might worsen the condition.   DO I NEED SURGERY?  When osteoarthritis has progressed substantially or failed to improve with non-surgical treatment, surgery may be recommended. In advanced cases, surgery may be the only option. The goal of surgery is to decrease pain and improve function. The foot and ankle surgeon will consider a number of factors when selecting the procedure best suited to the patient s condition and lifestyle.    GOUT  Gout is a  disorder that results from the build-up of uric acid in the tissues or a joint. It most often affects the joint of the big toe.  CAUSES  Gout attacks are caused by deposits of crystallized uric acid in the joint. Uric acid is present in the blood and eliminated in the urine, but in people who have gout, uric acid accumulates and crystallizes in the joints. Uric acid is the result of the breakdown of purines, chemicals that are found naturally in our bodies and in food. Some people develop gout because their kidneys have difficulty eliminating normal amounts of uric acid, while others produce too much uric acid.  Gout occurs most commonly in the big toe because uric acid is sensitive to temperature changes. At cooler temperatures, uric acid turns into crystals. Since the toe is the part of the body that is farthest from the heart, it s also the coolest part of the body - and, thus, the most likely target of gout. However, gout can affect any joint in the body.  The tendency to accumulate uric acid is often inherited. Other factors that put a person at risk for developing gout include: high blood pressure, diabetes, obesity, surgery, chemotherapy, stress, and certain medications and vitamins. For example, the body s ability to remove uric acid can be negatively affected by taking aspirin, some diuretic medications ( water pills ), and the vitamin niacin (also called nicotinic acid). While gout is more common in men aged 40 to 60 years, it can occur in younger men as well as in women.  Consuming foods and beverages that contain high levels of purines can trigger an attack of gout. Some foods contain more purines than others and have been associated with an increase of uric acid, which leads to gout. You may be able to reduce your chances of getting a gout attack by limiting or avoiding shellfish, organ meats (kidney, liver, etc.), red wine, beer, and red meat.  Other Triggers include but are not limited to:  Congestive  heart failure, deep vein thrombosis, pneumonia, fasting, dehydration, trauma/surgery, psoriasis.  SYMTOMS  An attack of gout can be miserable, marked by the following symptoms:  Intense pain that comes on suddenly - often in the middle of the night or upon arising   Signs of inflammation such as redness, swelling, and warmth over the joint.   DIAGNOSIS  To diagnose gout, the foot and ankle surgeon will ask questions about your personal and family medical history, followed by an examination of the affected joint. Laboratory tests and x-rays are sometimes ordered to determine if the inflammation is caused by something other than gout.  TREATMENT  Initial treatment of an attack of gout typically includes the following:  Medications. Prescription medications or injections are used to treat the pain, swelling, and inflammation.   Dietary restrictions. Foods and beverages that are high in purines should be avoided, since purines are converted in the body to uric acid.   Fluids. Drink plenty of water and other fluids each day, while also avoiding alcoholic beverages, which cause dehydration. Cherry Juice works well to help decrease pain.  Immobilize and elevate the foot. Avoid standing and walking to give your foot a rest. Also, elevate your foot (level with or slightly above the heart) to help reduce swelling.   The symptoms of gout and the inflammatory process usually resolve in three to ten days with treatment. If gout symptoms continue despite the initial treatment, or if repeated attacks occur, see your primary care physician for maintenance treatment that may involve daily medication. In cases of repeated episodes, the underlying problem must be addressed, as the build-up of uric acid over time can cause arthritic damage to the joint.  DIET CHANGE  A gout diet reduces your intake of foods that are high in purines, which helps control your body's production of uric acid. If you're overweight or obese, lose weight.  However, avoid fasting and rapid weight loss because these can promote a gout attack. Drink plenty of fluids to help flush uric acid from your body. Also avoid high-protein diets, which can cause you to produce too much uric acid (hyperuricemia).   To follow the diet:   Limit meat, poultry and fish. Animal proteins are high in purine. Avoid or severely limit high-purine foods, such as organ meats, herring, anchovies and mackerel. Red meat (beef, pork and lamb), fatty fish and seafood (tuna, shrimp, lobster and scallops) are associated with increased risk of gout. Because all meat, poultry and fish contain purines, limit your intake to 4 to 6 ounces (113 to 170 grams) daily.   Eat more plant-based proteins. You can increase your protein by including more plant-based sources, such as beans and legumes. This switch will also help you cut down on saturated fats, which may indirectly contribute to obesity and gout.   Limit or avoid alcohol. Alcohol interferes with the elimination of uric acid from your body. Drinking beer, in particular, has been linked to gout attacks. If you're having an attack, avoid alcohol. However, when you're not having an attack, drinking one or two 5-ounce (148 milliliter) servings a day of wine is not likely to increase your risk.   Drink plenty of fluids, particularly water. Fluids can help remove uric acid from your body. Aim for eight to 16 8-ounce (237 milliliter) glasses a day.   Choose low-fat or fat-free dairy products. Some studies have shown that drinking skim or low-fat milk and eating foods made with them, such as yogurt, help reduce the risk of gout. Aim for adequate dairy intake of 16 to 24 fluid ounces (473 to 710 milliliters) daily.   Choose complex carbohydrates. Eat more whole grains and fruits and vegetables and fewer refined carbohydrates, such as white bread, cakes and candy.   Limit or avoid sugar. Too many sweets can leave you with no room for plant-based proteins and  low-fat or fat-free dairy products -- the foods you need to avoid gout. Sugary foods also tend to be high in calories, so they make it easier to eat more than you're likely to burn off. Although there's debate about whether sugar has a direct effect on uric acid levels, sweets are definitely linked to overweight and obesity.   There's also some evidence that drinking four to six cups of coffee a day lowers gout risk in men.   RESULTS  Following a gout diet can help you limit your body's uric acid production and increase its elimination. It's not likely to lower the uric acid concentration in your blood enough to treat your gout without medication, but it may help decrease the number of attacks and limit their severity. Following the gout diet and limiting your calories -- particularly if you also add in moderate daily exercise, such as brisk walking -- also can improve your overall health by helping you achieve and maintain a healthy weight.                 FYI:  BODY MASS INDEX (BMI)  Many things can cause foot and ankle problems. Foot structure, activity level, foot mechanics and injuries are common causes of pain. One very important issue that often goes unmentioned, is body weight. Extra weight can cause increased stress on muscles, ligaments, bones and tendons. Sometimes just a few extra pounds is all it takes to put one over her/his threshold. Without reducing that stress, it can be difficult to alleviate pain. Some people are uncomfortable addressing this issue, but we feel it is important for you to think about it. As Foot & Ankle specialists, our job is addressing the lower extremity problem and possible causes. Regarding extra body weight, we encourage patients to discuss diet and weight management plans with their primary care doctors. It is this team approach that gives you the best opportunity for pain relief and getting you back on your feet.

## 2018-11-19 NOTE — PROGRESS NOTES
"  ASSESSMENT/PLAN:    Encounter Diagnoses   Name Primary?     Bilateral foot pain      Arthritis, midfoot Yes     Chronic gout of foot, unspecified cause, unspecified laterality      I think his foot pain is likely both mechanical (midfoot arthritis, higher arch, body weight) and occasional gout.  The gout is treated.    I reviewed the XR images with the patient.  The relevant anatomy and function was discussed, in relation to the problem.      Pt is referred to the [facility] for prescription orthoses.    I discussed what I consider is an appropriate shoe for his foot type.   He is to avoid barefoot walking  Stretching of posterior leg tissues was discussed.    Future options discussed:  Repeat MRI, image-guided steroid injection.       Body mass index is 40.86 kg/(m^2).    Weight management plan: Patient was referred to their PCP to discuss a diet and exercise plan.      Stiven Paris DPM, FACFAS, MS    [facility]      ____________________________________________________________________    HPI:       I was asked by Dr. Mayonr Johnson to evaluate this patient, in consultation, regarding bilateral foot pain and swelling.   Right foot > left.  Chief Complaint: see above  Onset of problem: 2.5 years  Pain/ discomfort is described as:  Ache, swelling, \"not just gout\"  Ratin-9/10 at worst   Frequency:  Every other month    The pain is made worse with weight bearing activity  Previous treatment: Allopurinol for gout, different shoes    He reports previously being evaluated at [facility]. An MRI was done showing some \"effusion\" in joints.     He also was diagnosed with gout, but the pain he is presenting for he says is different that the gout pain.  He points to the dorsal alteral foot, 3rd and 4th tarsometarsal joint region.    *  Patient Active Problem List   Diagnosis     Essential hypertension     BMI of 40.0-44.9, adult     Hyperlipidemia LDL goal " <100     Impaired fasting glucose     Abnormal results of liver function studies     Gout     Preventive measure     Morbid obesity (H)     Lipoma of skin and subcutaneous tissue   *  *History reviewed. No pertinent surgical history.*  *  Current Outpatient Prescriptions   Medication Sig Dispense Refill     allopurinol (ZYLOPRIM) 300 MG tablet Take 1 tablet (300 mg) by mouth daily 90 tablet 1     aspirin 81 MG tablet Take 1 tablet (81 mg) by mouth daily 30 tablet 11     colchicine (COLCRYS) 0.6 MG tablet 2 tabs at the onset of flare, then 1 tab every 2 hrs until pain is better or diarrhea occurs, up to 10 doses. Wait 3 days until taking again 30 tablet 3     metFORMIN (GLUCOPHAGE) 500 MG tablet Take 1 tablet (500 mg) by mouth 2 times daily (with meals) (Patient taking differently: Take 500 mg by mouth daily (with breakfast) ) 180 tablet 1     Olmesartan-Amlodipine-HCTZ 40-5-25 MG TABS Take 1 tablet by mouth daily 90 tablet 1     STATIN NOT PRESCRIBED, INTENTIONAL, Please choose reason not prescribed, below         ROS:     A 10-point review of systems was performed and is positive for that noted above in the HPI and as seen below.  All other areas are negative.     Numbness in feet?  no   Calf pain with walking? occasional  Recent foot/ankle injury? no  Weight change over past 12 months? 15-20# loss  Self perception as overweight? yes  Recent flu-like symptoms? no  Joint pain other than feet ? no    Social History: Employment:  ;  Exercise/Physical activity:  walking;  Tobacco use:  no  Social History     Social History     Marital status:      Spouse name: N/A     Number of children: N/A     Years of education: N/A     Occupational History     Not on file.     Social History Main Topics     Smoking status: Never Smoker     Smokeless tobacco: Never Used     Alcohol use Yes      Comment: occ.     Drug use: No     Sexual activity: Yes     Partners: Female     Other Topics Concern     Parent/Sibling W/  "Cabg, Mi Or Angioplasty Before 65f 55m? No     Social History Narrative       Family history:  Family History   Problem Relation Age of Onset     Diabetes Mother      Family History Negative Father      health hx unknown; smoker       Rheumatoid arthritis:  grandparent  Foot Problems: no  Diabetes: parent      EXAM:    Vitals: /72 (BP Location: Right arm, Patient Position: Chair, Cuff Size: Adult Large)  Ht 5' 6.5\" (1.689 m)  Wt 257 lb (116.6 kg)  BMI 40.86 kg/m2  BMI: Body mass index is 40.86 kg/(m^2).  Height: 5' 6.5\"    Constitutional/ general:  Pt is in no apparent distress, appears well-nourished.  Cooperative with history and physical exam.     Vascular:  Pedal pulses are palpable bilaterally for both the DP and PT arteries.  CFT < 3 sec.  No edema.  Pedal hair growth noted.     Neuro:  Alert and oriented x 3. Coordinated gait.  Light touch sensation is intact to the L4, L5, S1 distributions. No obvious deficits.  No evidence of neurological-based weakness, spasticity, or contracture in the lower extremities.     Derm: Normal texture and turgor.  No erythema, ecchymosis, or cyanosis.  No open lesions.     Musculoskeletal:    Lower extremity muscle strength is normal.  Patient is ambulatory without an assistive device or brace .  No gross deformities.  High medial longitudinal arches. Pain on palpation: mild tenderness at the 3rd tarsometatarsal joint level.      Radiographic Exam:  X-Ray Findings:  I personally reviewed the right foot films.  Rectus to higher arched foot. No acute findings. ? Narrowing of the 2nd and 3rd tarsometatarsal joint - notably the 3rd.        Stiven Paris DPM, FACFAS, MS    [facility]le [facility]              "

## 2018-11-19 NOTE — MR AVS SNAPSHOT
After Visit Summary   11/19/2018    Waylon Santiago    MRN: [id number]           Patient Information     Date Of Birth          1970        Visit Information        Provider Department      11/19/2018 2:45 PM [facility] Win [facility]        Today's Diagnoses     Arthritis, midfoot    -  1    Bilateral foot pain          Care Instructions    Thank you for choosing [facility]!    DR. WIN'S CLINIC LOCATIONS     MONDAY - [place] WEDNESDAY - [place]   [address] [place] [address] [place] [address]   [phone] / [phone] / [phone]       THURSDAY - [place] SCHEDULE SURGERY: [phone] [address] APPOINTMENTS: [phone]   [place] [address] BILLING QUESTIONS: [phone] / [phone]       [place] ORTHOT[facility] LOCATIONS  [facility]  [address]  [place] [address]  Phone: [phone]  Fax: [phone] [facility]  [address]  [place] [address]  Phone: [phone]   Fax: [phone]   [facility] [place]  [address]  [place] [address]  Phone: [phone]  Fax: [phone] [facility]  [address]  [place] [address]  Phone: [phone]   Fax: [phone]   [facility]   [address]  [place] [address]  Phone: [phone]   Fax: [phone] * Please call any location listed to make an appointment for a casting/fitting. Your referral was sent to their central office and they will all have the order on file.     OSTEOARTHRITIS OF THE FOOT & ANKLE  Osteoarthritis is a condition characterized by the breakdown and eventual loss of cartilage in one or more joints. Cartilage (the connective tissue found at the end of the bones in the joints) protects and cushions  the bones during movement. When cartilage deteriorates or is lost, symptoms develop that can restrict one s ability to easily perform daily activities.  Osteoarthritis is also known as degenerative arthritis, reflecting its nature to develop as part of the aging process. As the most common form of arthritis, osteoarthritis affects millions of Americans. Some people refer to osteoarthritis simply as arthritis, even though there are many different types of arthritis.  Osteoarthritis appears at various joints throughout the body, including the hands, feet, spine, hips, and knees. In the foot, the disease most frequently occurs in the big toe, although it is also often found in the midfoot and ankle.  CAUSES  Osteoarthritis is considered a  wear and tear  disease because the cartilage in the joint wears down with repeated stress and use over time. As the cartilage deteriorates and gets thinner, the bones lose their protective covering and eventually may rub together, causing pain and inflammation of the joint.  An injury may also lead to osteoarthritis, although it may take months or years after the injury for the condition to develop. For example, osteoarthritis in the big toe is often caused by kicking or jamming the toe, or by dropping something on the toe. Osteoarthritis in the midfoot is often caused by dropping something on it, or by a sprain or fracture. In the ankle, osteoarthritis is usually caused by a fracture and occasionally by a severe sprain.  Sometimes osteoarthritis develops as a result of abnormal foot mechanics such as flat feet or high arches. A flat foot causes less stability in the ligaments (bands of tissue that connect bones), resulting in excessive strain on the joints, which can cause arthritis. A high arch is rigid and lacks mobility, causing a jamming of joints that creates an increased risk of arthritis.  SYMPTOMS  People with osteoarthritis in the foot or ankle experience, in varying  degrees, one or more of the following: Pain and stiffness in the joint, swelling in or near the joint, or difficulty walking or bending the joint.   Some patients with osteoarthritis also develop a bone spur (a bony protrusion) at the affected joint. Shoe pressure may cause pain at the site of a bone spur, and in some cases blisters or calluses may form over its surface. Bone spurs can also limit the movement of the joint.    DIAGNOSIS  In diagnosing osteoarthritis, the foot and ankle surgeon will examine the foot thoroughly, looking for swelling in the joint, limited mobility, and pain with movement. In some cases, deformity and/or enlargement (spur) of the joint may be noted. X-rays may be ordered to evaluate the extent of the disease.  NON-SURGICAL TREATMENT  To help relieve symptoms, the surgeon may begin treating osteoarthritis with one or more of the following non-surgical approaches:  Oral medications. Nonsteroidal anti-inflammatory drugs (NSAIDs), such as ibuprofen, are often helpful in reducing the inflammation and pain. Occasionally a prescription for a steroid medication is needed to adequately reduce symptoms.   Orthotic devices. Custom orthotic devices (shoe inserts) are often prescribed to provide support to improve the foot s mechanics or cushioning to help minimize pain.   Bracing. Bracing, which restricts motion and supports the joint, can reduce pain during walking and help prevent further deformity.   Immobilization. Protecting the foot from movement by wearing a cast or removable cast-boot may be necessary to allow the inflammation to resolve.   Steroid injections. In some cases, steroid injections are applied to the affected joint to deliver anti-inflammatory medication.   Physical therapy. Exercises to strengthen the muscles, especially when the osteoarthritis occurs in the ankle, may give the patient greater stability and help avoid injury that might worsen the condition.   DO I NEED  SURGERY?  When osteoarthritis has progressed substantially or failed to improve with non-surgical treatment, surgery may be recommended. In advanced cases, surgery may be the only option. The goal of surgery is to decrease pain and improve function. The foot and ankle surgeon will consider a number of factors when selecting the procedure best suited to the patient s condition and lifestyle.    GOUT  Gout is a disorder that results from the build-up of uric acid in the tissues or a joint. It most often affects the joint of the big toe.  CAUSES  Gout attacks are caused by deposits of crystallized uric acid in the joint. Uric acid is present in the blood and eliminated in the urine, but in people who have gout, uric acid accumulates and crystallizes in the joints. Uric acid is the result of the breakdown of purines, chemicals that are found naturally in our bodies and in food. Some people develop gout because their kidneys have difficulty eliminating normal amounts of uric acid, while others produce too much uric acid.  Gout occurs most commonly in the big toe because uric acid is sensitive to temperature changes. At cooler temperatures, uric acid turns into crystals. Since the toe is the part of the body that is farthest from the heart, it s also the coolest part of the body - and, thus, the most likely target of gout. However, gout can affect any joint in the body.  The tendency to accumulate uric acid is often inherited. Other factors that put a person at risk for developing gout include: high blood pressure, diabetes, obesity, surgery, chemotherapy, stress, and certain medications and vitamins. For example, the body s ability to remove uric acid can be negatively affected by taking aspirin, some diuretic medications ( water pills ), and the vitamin niacin (also called nicotinic acid). While gout is more common in men aged 40 to 60 years, it can occur in younger men as well as in women.  Consuming foods and  beverages that contain high levels of purines can trigger an attack of gout. Some foods contain more purines than others and have been associated with an increase of uric acid, which leads to gout. You may be able to reduce your chances of getting a gout attack by limiting or avoiding shellfish, organ meats (kidney, liver, etc.), red wine, beer, and red meat.  Other Triggers include but are not limited to:  Congestive heart failure, deep vein thrombosis, pneumonia, fasting, dehydration, trauma/surgery, psoriasis.  SYMTOMS  An attack of gout can be miserable, marked by the following symptoms:  Intense pain that comes on suddenly - often in the middle of the night or upon arising   Signs of inflammation such as redness, swelling, and warmth over the joint.   DIAGNOSIS  To diagnose gout, the foot and ankle surgeon will ask questions about your personal and family medical history, followed by an examination of the affected joint. Laboratory tests and x-rays are sometimes ordered to determine if the inflammation is caused by something other than gout.  TREATMENT  Initial treatment of an attack of gout typically includes the following:  Medications. Prescription medications or injections are used to treat the pain, swelling, and inflammation.   Dietary restrictions. Foods and beverages that are high in purines should be avoided, since purines are converted in the body to uric acid.   Fluids. Drink plenty of water and other fluids each day, while also avoiding alcoholic beverages, which cause dehydration. Cherry Juice works well to help decrease pain.  Immobilize and elevate the foot. Avoid standing and walking to give your foot a rest. Also, elevate your foot (level with or slightly above the heart) to help reduce swelling.   The symptoms of gout and the inflammatory process usually resolve in three to ten days with treatment. If gout symptoms continue despite the initial treatment, or if repeated attacks occur, see your  primary care physician for maintenance treatment that may involve daily medication. In cases of repeated episodes, the underlying problem must be addressed, as the build-up of uric acid over time can cause arthritic damage to the joint.  DIET CHANGE  A gout diet reduces your intake of foods that are high in purines, which helps control your body's production of uric acid. If you're overweight or obese, lose weight. However, avoid fasting and rapid weight loss because these can promote a gout attack. Drink plenty of fluids to help flush uric acid from your body. Also avoid high-protein diets, which can cause you to produce too much uric acid (hyperuricemia).   To follow the diet:   Limit meat, poultry and fish. Animal proteins are high in purine. Avoid or severely limit high-purine foods, such as organ meats, herring, anchovies and mackerel. Red meat (beef, pork and lamb), fatty fish and seafood (tuna, shrimp, lobster and scallops) are associated with increased risk of gout. Because all meat, poultry and fish contain purines, limit your intake to 4 to 6 ounces (113 to 170 grams) daily.   Eat more plant-based proteins. You can increase your protein by including more plant-based sources, such as beans and legumes. This switch will also help you cut down on saturated fats, which may indirectly contribute to obesity and gout.   Limit or avoid alcohol. Alcohol interferes with the elimination of uric acid from your body. Drinking beer, in particular, has been linked to gout attacks. If you're having an attack, avoid alcohol. However, when you're not having an attack, drinking one or two 5-ounce (148 milliliter) servings a day of wine is not likely to increase your risk.   Drink plenty of fluids, particularly water. Fluids can help remove uric acid from your body. Aim for eight to 16 8-ounce (237 milliliter) glasses a day.   Choose low-fat or fat-free dairy products. Some studies have shown that drinking skim or low-fat  milk and eating foods made with them, such as yogurt, help reduce the risk of gout. Aim for adequate dairy intake of 16 to 24 fluid ounces (473 to 710 milliliters) daily.   Choose complex carbohydrates. Eat more whole grains and fruits and vegetables and fewer refined carbohydrates, such as white bread, cakes and candy.   Limit or avoid sugar. Too many sweets can leave you with no room for plant-based proteins and low-fat or fat-free dairy products -- the foods you need to avoid gout. Sugary foods also tend to be high in calories, so they make it easier to eat more than you're likely to burn off. Although there's debate about whether sugar has a direct effect on uric acid levels, sweets are definitely linked to overweight and obesity.   There's also some evidence that drinking four to six cups of coffee a day lowers gout risk in men.   RESULTS  Following a gout diet can help you limit your body's uric acid production and increase its elimination. It's not likely to lower the uric acid concentration in your blood enough to treat your gout without medication, but it may help decrease the number of attacks and limit their severity. Following the gout diet and limiting your calories -- particularly if you also add in moderate daily exercise, such as brisk walking -- also can improve your overall health by helping you achieve and maintain a healthy weight.                 FYI:  BODY MASS INDEX (BMI)  Many things can cause foot and ankle problems. Foot structure, activity level, foot mechanics and injuries are common causes of pain. One very important issue that often goes unmentioned, is body weight. Extra weight can cause increased stress on muscles, ligaments, bones and tendons. Sometimes just a few extra pounds is all it takes to put one over her/his threshold. Without reducing that stress, it can be difficult to alleviate pain. Some people are uncomfortable addressing this issue, but we feel it is important for you  to think about it. As Foot & Ankle specialists, our job is addressing the lower extremity problem and possible causes. Regarding extra body weight, we encourage patients to discuss diet and weight management plans with their primary care doctors. It is this team approach that gives you the best opportunity for pain relief and getting you back on your feet.                Follow-ups after your visit        Additional Services     ORTHOTICS REFERRAL       **This referral order prints off in the [facility]  (Orthotics & Prosthetics) Central Scheduling Office**    The [facility] Central Scheduling Staff will contact the patient to schedule appointments.     Central Scheduling Contact Information: [phone] ([place])    Orthotics: Foot Orthotics    Please be aware that coverage of these services is subject to the terms and limitations of your health insurance plan.  Call member services at your health plan with any benefit or coverage questions.      Please bring the following to your appointment:    >>   Any x-rays, CTs or MRIs which have been performed.  Contact the facility where they were done to arrange for  prior to your scheduled appointment.    >>   List of current medications   >>   This referral request   >>   Any documents/labs given to you for this referral                  Your next 10 appointments already scheduled     Nov 28, 2018  8:15 AM CST   LAB with BX LAB   [facility] ([facility])    [address] [place] [address] [phone]           Please do not eat 10-12 hours before your appointment if you are coming in fasting for labs on lipids, cholesterol, or glucose (sugar). This does not apply to pregnant women. Water, hot tea and black coffee (with nothing added) are okay. Do not drink other fluids, diet soda or chew gum.            Dec 03, 2018  4:30 PM DANIEL HUFF with Maynor  "Kaushal Johnson MD   [facility]    [address] [id number] [phone]              Who to contact     If you have questions or need follow up information about today's clinic visit or your schedule please contact [facility] directly at [phone].  Normal or non-critical lab and imaging results will be communicated to you by [employer], letter or phone within 4 business days after the clinic has received the results. If you do not hear from us within 7 days, please contact the clinic through [employer] or phone. If you have a critical or abnormal lab result, we will notify you by phone as soon as possible.  Submit refill requests through [employer] or call your pharmacy and they will forward the refill request to us. Please allow 3 business days for your refill to be completed.          Additional Information About Your Visit        Sim[employer] Information     [employer] lets you send messages to your doctor, view your test results, renew your prescriptions, schedule appointments and more. To sign up, go to [email or web address] . Click on \"Log in\" on the left side of the screen, which will take you to the Welcome page. Then click on \"Sign up Now\" on the right side of the page.     You will be asked to enter the access code listed below, as well as some personal information. Please follow the directions to create your username and password.     Your access code is: [email or web address]  Expires: 12/10/2018  7:06 AM     Your access code will  in 90 days. If you need help or a new code, please call your [facility] or [phone].        Care EveryWhere ID     This is your Care EveryWhere ID. This could be used by other organizations to access your [facility] medical records  [id number]        Your Vitals Were     Height BMI (Body Mass Index)                5' 6.5\" (1.689 m) 40.86 kg/m2 "           Blood Pressure from Last 3 Encounters:   11/19/18 109/72   09/17/18 120/70   06/12/18 116/78    Weight from Last 3 Encounters:   11/19/18 257 lb (116.6 kg)   09/17/18 253 lb (114.8 kg)   06/12/18 260 lb (117.9 kg)              We Performed the Following     ORTHOTICS REFERRAL     XR Foot Right G/E 3 Views          Today's Medication Changes          These changes are accurate as of 11/19/18  3:39 PM.  If you have any questions, ask your nurse or doctor.               These medicines have changed or have updated prescriptions.        Dose/Directions    metFORMIN 500 MG tablet   Commonly known as:  GLUCOPHAGE   This may have changed:  when to take this   Used for:  Impaired fasting glucose        Dose:  500 mg   Take 1 tablet (500 mg) by mouth 2 times daily (with meals)   Quantity:  180 tablet   Refills:  1                Primary Care Provider Office Phone # Fax #    Maynor Johnson [phone]       [address] [facility] [address]        Equal Access to Services     [facility] : Hadii adela ku hadasho Soomaali, waaxda luqadaha, qaybta kaalmada adeegyada, meg tomlin . So [facility] [phone].    ATENCIÓN: Si habla español, tiene a lindsay disposición servicios gratuitos de asistencia lingüística. Llame al [phone].    We comply with applicable federal civil rights laws and [place] laws. We do not discriminate on the basis of race, color, national origin, age, disability, sex, sexual orientation, or gender identity.            Thank you!     Thank you for choosing [facility]  for your care. Our goal is always to provide you with excellent care. Hearing back from our patients is one way we can continue to improve our services. Please take a few minutes to complete the written survey that you may receive in the mail after your visit with us. Thank you!             Your Updated Medication List - Protect others around you: Learn  how to safely use, store and throw away your medicines at www.disposemymeds.org.          This list is accurate as of 11/19/18  3:39 PM.  Always use your most recent med list.                   Brand Name Dispense Instructions for use Diagnosis    allopurinol 300 MG tablet    ZYLOPRIM    90 tablet    Take 1 tablet (300 mg) by mouth daily    Idiopathic chronic gout of foot without tophus, unspecified laterality       aspirin 81 MG tablet     30 tablet    Take 1 tablet (81 mg) by mouth daily    Hypertension goal BP (blood pressure) < 140/90       colchicine 0.6 MG tablet    COLCRYS    30 tablet    2 tabs at the onset of flare, then 1 tab every 2 hrs until pain is better or diarrhea occurs, up to 10 doses. Wait 3 days until taking again    Idiopathic chronic gout of foot without tophus, unspecified laterality       metFORMIN 500 MG tablet    GLUCOPHAGE    180 tablet    Take 1 tablet (500 mg) by mouth 2 times daily (with meals)    Impaired fasting glucose       Olmesartan-Amlodipine-HCTZ 40-5-25 MG Tabs     90 tablet    Take 1 tablet by mouth daily        STATIN NOT PRESCRIBED (INTENTIONAL)      Please choose reason not prescribed, below    Impaired fasting glucose

## 2018-11-24 DIAGNOSIS — I10 ESSENTIAL HYPERTENSION: Primary | ICD-10-CM

## 2018-11-24 DIAGNOSIS — R73.01 IMPAIRED FASTING GLUCOSE: ICD-10-CM

## 2018-11-24 DIAGNOSIS — M1A.0790 IDIOPATHIC CHRONIC GOUT OF FOOT WITHOUT TOPHUS, UNSPECIFIED LATERALITY: ICD-10-CM

## 2018-11-26 NOTE — TELEPHONE ENCOUNTER
"Requested Prescriptions   Pending Prescriptions Disp Refills     allopurinol (ZYLOPRIM) 300 MG tablet [Pharmacy Med Name: ALLOPURINOL 300MG TABLETS]  Last Written Prescription Date:  06/12/2018  Last Fill Quantity: 90,  # refills: 1   Last office visit: 9/17/2018 with prescribing provider:  ANJEL   Future Office Visit:   Next 5 appointments (look out 90 days)     Dec 03, 2018  4:30 PM CST   SHORT with Maynor Johnson MD   [facility] ([facility])    [address] [place] [id number]   [phone]                  90 tablet 0     Sig: TAKE 1 TABLET(300 MG) BY MOUTH DAILY    Gout Agents Protocol Passed    11/24/2018  2:44 PM       Passed - CBC on file in past 12 months    Recent Labs   Lab Test  12/12/17   0755   WBC  6.8   RBC  4.57   HGB  14.9   HCT  43.7   PLT  228                Passed - ALT on file in past 12 months    Recent Labs   Lab Test  06/08/18   0912   ALT  81*            Passed - Has Uric Acid on file in past 12 months and value is less than 6    Recent Labs   Lab Test  09/11/18   0808   URIC  5.6     If level is 6mg/dL or greater, ok to refill one time and refer to provider.          Passed - Recent (12 mo) or future (30 days) visit within the authorizing provider's specialty    Patient had office visit in the last 12 months or has a visit in the next 30 days with authorizing provider or within the authorizing provider's specialty.  See \"Patient Info\" tab in inbasket, or \"Choose Columns\" in Meds & Orders section of the refill encounter.             Passed - Patient is age 18 or older       Passed - Normal serum creatinine on file in the past 12 months    Recent Labs   Lab Test  09/11/18   0808   CR  0.97             metFORMIN (GLUCOPHAGE) 500 MG tablet [Pharmacy Med Name: METFORMIN 500MG TABLETS]  Last Written Prescription Date:  06/12/2018  Last Fill Quantity: 180,  # refills: 1   Last office visit: " "9/17/2018 with prescribing provider:  ANJEL   Future Office Visit:   Next 5 appointments (look out 90 days)     Dec 03, 2018  4:30 PM CST   SHORT with Maynor Johnson MD   [facility]    [address] [address]   [phone]                  180 tablet 0     Sig: TAKE 1 TABLET(500 MG) BY MOUTH TWICE DAILY WITH MEALS    Biguanide Agents Failed    11/24/2018  2:44 PM       Failed - Patient has had a Microalbumin in the past 15 mos.    Recent Labs   Lab Test  05/17/17   0927   MICROL  13   UMALCR  7.24            Passed - Blood pressure less than 140/90 in past 6 months    BP Readings from Last 3 Encounters:   11/19/18 109/72   09/17/18 120/70   06/12/18 116/78                Passed - Patient has documented LDL within the past 12 mos.    Recent Labs   Lab Test  09/11/18   0808   LDL  51            Passed - Patient is age 10 or older       Passed - Patient has documented A1c within the specified period of time.    If HgbA1C is 8 or greater, it needs to be on file within the past 3 months.  If less than 8, must be on file within the past 6 months.     Recent Labs   Lab Test  06/08/18   0912   A1C  5.4            Passed - Patient's CR is NOT>1.4 OR Patient's EGFR is NOT<45 within past 12 mos.    Recent Labs   Lab Test  09/11/18   0808   GFRESTIMATED  82   GFRESTBLACK  >90       Recent Labs   Lab Test  09/11/18   0808   CR  0.97            Passed - Patient does NOT have a diagnosis of CHF.       Passed - Recent (6 mo) or future (30 days) visit within the authorizing provider's specialty    Patient had office visit in the last 6 months or has a visit in the next 30 days with authorizing provider or within the authorizing provider's specialty.  See \"Patient Info\" tab in inbasket, or \"Choose Columns\" in Meds & Orders section of the refill encounter.            Olmesartan-Amlodipine-HCTZ 40-5-25 MG TABS " "[Pharmacy Med Name: OLMESARTAN/AMLOD/HCTZ 40-5-25MG T]  Last Written Prescription Date:  06/12/2018  Last Fill Quantity: 90,  # refills: 1   Last office visit: 9/17/2018 with prescribing provider:  ANJEL   Future Office Visit:   Next 5 appointments (look out 90 days)     Dec 03, 2018  4:30 PM CST   SHORT with Maynor Johnson MD   [facility] ([facility])    [address] [place] [id number]   [phone]                  90 tablet 0     Sig: TAKE 1 TABLET BY MOUTH DAILY    Angiotensin-II Receptors Passed    11/24/2018  2:44 PM       Passed - Blood pressure under 140/90 in past 12 months    BP Readings from Last 3 Encounters:   11/19/18 109/72   09/17/18 120/70   06/12/18 116/78                Passed - Recent (12 mo) or future (30 days) visit within the authorizing provider's specialty    Patient had office visit in the last 12 months or has a visit in the next 30 days with authorizing provider or within the authorizing provider's specialty.  See \"Patient Info\" tab in inbasket, or \"Choose Columns\" in Meds & Orders section of the refill encounter.             Passed - Patient is age 18 or older       Passed - Normal serum creatinine on file in past 12 months    Recent Labs   Lab Test  09/11/18   0808   CR  0.97            Passed - Normal serum potassium on file in past 12 months    Recent Labs   Lab Test  09/11/18   0808   POTASSIUM  3.8                      "

## 2018-11-27 RX ORDER — ALLOPURINOL 300 MG/1
TABLET ORAL
Qty: 90 TABLET | Refills: 2 | Status: SHIPPED | OUTPATIENT
Start: 2018-11-27 | End: 2019-09-23

## 2018-11-27 RX ORDER — OLMESARTAN MEDOXOMIL, AMLODIPINE AND HYDROCHLOROTHIAZIDE TABLET 40/5/25 MG 40; 5; 25 MG/1; MG/1; MG/1
TABLET ORAL
Qty: 90 TABLET | Refills: 2 | Status: SHIPPED | OUTPATIENT
Start: 2018-11-27 | End: 2019-06-26

## 2018-11-27 NOTE — TELEPHONE ENCOUNTER
Routing refill request to provider for review/approval because:  Labs not current:  metFORMIN (GLUCOPHAGE) 500 MG tablet     Biguanide Agents Failed      11/24/2018  2:44 PM         Failed - Patient has had a Microalbumin in the past 15 mos.         Recent Labs   Lab Test  05/17/17   0927   MICROL  13   UMALCR  7.24             Prescription approved per [facility] Refill Protocol.  allopurinol (ZYLOPRIM) 300 MG tablet  Olmesartan-Amlodipine-HCTZ 40-5-25 MG TABS

## 2018-12-14 DIAGNOSIS — E78.5 HYPERLIPIDEMIA LDL GOAL <100: ICD-10-CM

## 2018-12-14 LAB
ALT SERPL W P-5'-P-CCNC: 66 U/L (ref 0–70)
CHOLEST SERPL-MCNC: 145 MG/DL
HDLC SERPL-MCNC: 23 MG/DL
LDLC SERPL CALC-MCNC: 68 MG/DL
NONHDLC SERPL-MCNC: 122 MG/DL
TRIGL SERPL-MCNC: 272 MG/DL

## 2018-12-14 PROCEDURE — 36415 COLL VENOUS BLD VENIPUNCTURE: CPT | Performed by: FAMILY MEDICINE

## 2018-12-14 PROCEDURE — 80061 LIPID PANEL: CPT | Performed by: FAMILY MEDICINE

## 2018-12-14 PROCEDURE — 84460 ALANINE AMINO (ALT) (SGPT): CPT | Performed by: FAMILY MEDICINE

## 2018-12-17 ENCOUNTER — OFFICE VISIT (OUTPATIENT)
Dept: FAMILY MEDICINE | Facility: CLINIC | Age: 48
End: 2018-12-17
Payer: COMMERCIAL

## 2018-12-17 VITALS
HEART RATE: 104 BPM | SYSTOLIC BLOOD PRESSURE: 110 MMHG | WEIGHT: 257 LBS | DIASTOLIC BLOOD PRESSURE: 70 MMHG | BODY MASS INDEX: 40.86 KG/M2 | TEMPERATURE: 98 F | OXYGEN SATURATION: 98 %

## 2018-12-17 DIAGNOSIS — E11.9 DIABETES MELLITUS WITHOUT COMPLICATION (H): ICD-10-CM

## 2018-12-17 DIAGNOSIS — E66.01 MORBID OBESITY (H): ICD-10-CM

## 2018-12-17 DIAGNOSIS — E78.5 HYPERLIPIDEMIA LDL GOAL <100: ICD-10-CM

## 2018-12-17 DIAGNOSIS — I10 ESSENTIAL HYPERTENSION: Primary | ICD-10-CM

## 2018-12-17 DIAGNOSIS — M1A.0790 IDIOPATHIC CHRONIC GOUT OF FOOT WITHOUT TOPHUS, UNSPECIFIED LATERALITY: ICD-10-CM

## 2018-12-17 PROCEDURE — 99214 OFFICE O/P EST MOD 30 MIN: CPT | Performed by: FAMILY MEDICINE

## 2018-12-17 NOTE — PROGRESS NOTES
SUBJECTIVE:   Waylon Santiago is a 48 year old male who presents to clinic today for the following health issues:      Hypertension Follow-up      Outpatient blood pressures are not being checked.    Low Salt Diet: no added salt      Amount of exercise or physical activity: walking    Problems taking medications regularly: No    Medication side effects: none    Diet: regular (no restrictions)        Hyperlipidemia Follow-Up      Rate your low fat/cholesterol diet?: fair    Taking statin?  No    Other lipid medications/supplements?:  none      Problem list and histories reviewed & adjusted, as indicated.  Additional history: as documented    Patient Active Problem List   Diagnosis     Essential hypertension     BMI of 40.0-44.9, adult     Hyperlipidemia LDL goal <100     Impaired fasting glucose     Abnormal results of liver function studies     Gout     Preventive measure     Morbid obesity (H)     Lipoma of skin and subcutaneous tissue     History reviewed. No pertinent surgical history.    Social History     Tobacco Use     Smoking status: Never Smoker     Smokeless tobacco: Never Used   Substance Use Topics     Alcohol use: Yes     Comment: occ.     Family History   Problem Relation Age of Onset     Diabetes Mother      Family History Negative Father         health hx unknown; smoker           Reviewed and updated as needed this visit by clinical staff  Tobacco  Allergies  Meds  Med Hx  Surg Hx  Fam Hx  Soc Hx      Reviewed and updated as needed this visit by Provider         ROS:  Constitutional, HEENT, cardiovascular, pulmonary, gi and gu systems are negative, except as otherwise noted.    OBJECTIVE:                                                    /70 (Cuff Size: Adult Large)   Pulse 104   Temp 98  F (36.7  C) (Tympanic)   Wt 116.6 kg (257 lb)   SpO2 98%   BMI 40.86 kg/m    Body mass index is 40.86 kg/m .  GENERAL APPEARANCE: healthy, alert, no distress and over weight    Diagnostic  test results:  Results for orders placed or performed in visit on 12/14/18   Lipid panel reflex to direct LDL Fasting   Result Value Ref Range    Cholesterol 145 <200 mg/dL    Triglycerides 272 (H) <150 mg/dL    HDL Cholesterol 23 (L) >39 mg/dL    LDL Cholesterol Calculated 68 <100 mg/dL    Non HDL Cholesterol 122 <130 mg/dL   ALT   Result Value Ref Range    ALT 66 0 - 70 U/L        ASSESSMENT/PLAN:                                                        ICD-10-CM    1. Essential hypertension I10    2. Hyperlipidemia LDL goal <100 E78.5    3. Idiopathic chronic gout of foot without tophus, unspecified laterality M1A.0790    4. Morbid obesity (H) E66.01    5. Diabetes mellitus without complication (H) E11.9    Return in about 5 months (around 5/17/2019) for Physical Exam.  Patient Instructions   Patient is due for his annual physical and laboratory testing including colonoscopy.  He will schedule that appointment at his convenience probably in May or June since that will be need blood tests.  He will continue to try to limit his pasta, potatoes, bread, alcohol and sweets.  Clearly this is had some effect on his lipid numbers.  He has not been good of late with holidays being around.  We will need to follow-up on his blood tests at that point also.He has refills that should last him until his physical is due.  However, if that does get to be a problem and will refill his medications to last him through until May or June.      Maynor Johnson MD  [facility]

## 2018-12-18 NOTE — PATIENT INSTRUCTIONS
Patient is due for his annual physical and laboratory testing including colonoscopy.  He will schedule that appointment at his convenience probably in May or June since that will be need blood tests.  He will continue to try to limit his pasta, potatoes, bread, alcohol and sweets.  Clearly this is had some effect on his lipid numbers.  He has not been good of late with holidays being around.  We will need to follow-up on his blood tests at that point also.He has refills that should last him until his physical is due.  However, if that does get to be a problem and will refill his medications to last him through until May or June.

## 2019-02-27 DIAGNOSIS — R73.01 IMPAIRED FASTING GLUCOSE: ICD-10-CM

## 2019-02-27 NOTE — TELEPHONE ENCOUNTER
"METFORMIN 500MG TABLETS  Last Written Prescription Date:  1/27/18  Last Fill Quantity: 180,  # refills: 0   Last office visit: 12/17/2018 with prescribing provider:  12/17/18   Future Office Visit:    Requested Prescriptions   Pending Prescriptions Disp Refills     metFORMIN (GLUCOPHAGE) 500 MG tablet [Pharmacy Med Name: METFORMIN 500MG TABLETS] 180 tablet 0     Sig: TAKE 1 TABLET(500 MG) BY MOUTH TWICE DAILY WITH MEALS    Biguanide Agents Failed - 2/27/2019  1:00 PM       Failed - Patient has had a Microalbumin in the past 15 mos.    Recent Labs   Lab Test 05/17/17  0927   MICROL 13   UMALCR 7.24            Failed - Patient has documented A1c within the specified period of time.    If HgbA1C is 8 or greater, it needs to be on file within the past 3 months.  If less than 8, must be on file within the past 6 months.     Recent Labs   Lab Test 06/08/18  0912   A1C 5.4            Passed - Blood pressure less than 140/90 in past 6 months    BP Readings from Last 3 Encounters:   12/17/18 110/70   11/19/18 109/72   09/17/18 120/70                Passed - Patient has documented LDL within the past 12 mos.    Recent Labs   Lab Test 12/14/18  0813   LDL 68            Passed - Patient is age 10 or older       Passed - Patient's CR is NOT>1.4 OR Patient's EGFR is NOT<45 within past 12 mos.    Recent Labs   Lab Test 09/11/18  0808   GFRESTIMATED 82   GFRESTBLACK >90       Recent Labs   Lab Test 09/11/18  0808   CR 0.97            Passed - Patient does NOT have a diagnosis of CHF.       Passed - Medication is active on med list       Passed - Recent (6 mo) or future (30 days) visit within the authorizing provider's specialty    Patient had office visit in the last 6 months or has a visit in the next 30 days with authorizing provider or within the authorizing provider's specialty.  See \"Patient Info\" tab in inbasket, or \"Choose Columns\" in Meds & Orders section of the refill encounter.              "

## 2019-02-28 NOTE — TELEPHONE ENCOUNTER
Medication is being filled for 1 time refill only due to:  Future labs ordered Microalbumin and A1C.

## 2019-06-07 ENCOUNTER — E-VISIT (OUTPATIENT)
Dept: FAMILY MEDICINE | Facility: CLINIC | Age: 49
End: 2019-06-07
Payer: COMMERCIAL

## 2019-06-07 DIAGNOSIS — J01.01 ACUTE RECURRENT MAXILLARY SINUSITIS: Primary | ICD-10-CM

## 2019-06-07 PROCEDURE — 99444 ZZC PHYSICIAN ONLINE EVALUATION & MANAGEMENT SERVICE: CPT | Performed by: FAMILY MEDICINE

## 2019-06-09 RX ORDER — AMOXICILLIN 500 MG/1
1000 CAPSULE ORAL 3 TIMES DAILY
Qty: 60 CAPSULE | Refills: 0 | Status: SHIPPED | OUTPATIENT
Start: 2019-06-09 | End: 2019-06-26

## 2019-06-09 NOTE — PATIENT INSTRUCTIONS
Thank you for choosing us for your care. I have placed an order for a prescription so that you can start treatment. View your full visit summary for details by clicking on the link below. Your pharmacist will able to address any questions you may have about the medication.     If you're not feeling better within 5-7 days, please schedule an appointment.  You can schedule an appointment right here in [place], or call [phone]  If the visit is for the same symptoms as your e-visit, we'll refund the cost of your e-visit if seen within seven days.    You may want to try a nasal lavage (also known as nasal irrigation). You can find over-the-counter products, such as Neti-Pot, at retail locations or make your own at home. Instructions for homemade nasal lavage and more information on the process are available online at http://www.aafp.org/afp/2009/1115/p1121.html.      When to Use Antibiotics   Antibiotics are medicines used to treat infections caused by bacteria. They don t work for illnesses caused by viruses or an allergic reaction. In fact, taking antibiotics for reasons other than a bacterial infection can cause problems. For example, you may have side effects from the medicine. And if you really need an antibiotic, it may not work well.                                                                                                                                              When antibiotics won t help  Your healthcare provider won t usually prescribe antibiotics for the following conditions. You can help by not asking for them if you have:     A cold. This type of illness is caused by a virus. It can cause a runny nose, stuffed-up nose, sneezing, coughing, headache, mild body aches, and low fever. A cold gets better on its own in a few days to a week.    The flu (influenza). This is a respiratory illness caused by a virus. The flu usually goes away on its own in a week or so. It can cause fever, body aches,  sore throat, and fatigue.    Bronchitis. This is an infection in the lungs most often caused by a virus. You may have coughing, phlegm, body aches, and a low fever. A common type of bronchitis is known as a chest cold (acute bronchitis). This often happens after you have a respiratory infection like a common cold. Bronchitis can take weeks to go away, but antibiotics usually don t help.    Most sore throats. Sore throats are most often caused by viruses. Your throat may feel scratchy or achy, and it may hurt to swallow. You may also have a low fever and body aches. A sore throat usually gets better in a few days.    Most ear infections. An ear infection may be caused by a virus or bacteria. It causes pain in the ear. Antibiotics usually don t help, and the infection goes away on its own.    Most sinus infections (sinusitis). This kind of infection causes sinus pain and swelling, and a runny nose. In most cases, sinusitis goes away on its own, and antibiotics don t make recovery quicker.    Allergic rhinitis. This is a set of symptoms caused by an allergic reaction. You may have sneezing, a runny nose, itchy or watery eyes, or a sore throat. Allergies are not treated with antibiotics.    Low fever. A mild fever that s less than 100.4 F (38 C) most likely doesn t need treatment with antibiotics.   When antibiotics can help   Antibiotics can be used to treat:                                                       Strep throat. This is a throat infectioncaused by a certain type of bacteria. Symptoms of strep throat include a sore throat, white patches on the tonsils, red spots on the roof of the mouth, fever, body aches, and nausea and vomiting.    Urinary tract infection (UTI). This is a bacterial infection of the bladder and the tube that takes urine out of the body. It can cause burning pain and urine that s cloudy or tinted with blood. UTIs are very common. Antibiotics usually help treat these infections.    Some ear  infections. In some cases, a healthcare provider may prescribe antibiotics for an ear infection. You may need a test to show what s causing the ear infection.    Some sinus infections. In some cases, yourhealthcare provider may give you antibiotics. He or she may first need to make sure your symptoms aren t caused by a virus, fungus, allergies, or air pollutants such as smoke.   Your doctor may also recommend antibiotics if you have a condition that can affect your immune system, such as diabetes or cancer.   Self-care at home   If your infection can t be treated with antibiotics, you can take other steps to feel better. Try the remedies below. In general:     Rest and sleep as much as needed.    Drink water and other clear fluids.    Don t smoke, and avoid smoke from other people.    Use over-the-counter medicine such as acetaminophen to ease pain or fever, as directed by your healthcare provider.   To treat sinus pain or nasal congestion:     Put a warm, moist washcloth on your face where you feel sinus pain or pressure.    Use a nasal spray with medicine or saline, as directed by your healthcare provider.    Breathe in steam from a hot shower.    Use a humidifier or cool mist vaporizer.   To quiet a cough:     Use a humidifier or cool mist vaporizer.    Breathe in steam from a hot shower.    Use cough lozenges.   To sooth a sore throat:     Suck on ice chips, popsicles, or lozenges.    Use a sore throat spray.    Use a humidifier or cool mist vaporizer.    Gargle with saltwater.    Drink warm liquids.   To ease ear pain:     Hold a warm, moist washcloth on the ear for 10 minutes at a time.  Date Last Reviewed: 9/1/2016 2000-2018 [employer]. [address] [place] [address]. All rights reserved. This information is not intended as a substitute for professional medical care. Always follow your healthcare professional's instructions.

## 2019-06-24 DIAGNOSIS — R73.01 IMPAIRED FASTING GLUCOSE: ICD-10-CM

## 2019-06-24 LAB
CREAT UR-MCNC: 220 MG/DL
HBA1C MFR BLD: 5.4 % (ref 0–5.6)
MICROALBUMIN UR-MCNC: 9 MG/L
MICROALBUMIN/CREAT UR: 4.3 MG/G CR (ref 0–17)

## 2019-06-24 PROCEDURE — 83036 HEMOGLOBIN GLYCOSYLATED A1C: CPT | Performed by: FAMILY MEDICINE

## 2019-06-24 PROCEDURE — 82043 UR ALBUMIN QUANTITATIVE: CPT | Performed by: FAMILY MEDICINE

## 2019-06-24 PROCEDURE — 36415 COLL VENOUS BLD VENIPUNCTURE: CPT | Performed by: FAMILY MEDICINE

## 2019-06-26 ENCOUNTER — OFFICE VISIT (OUTPATIENT)
Dept: FAMILY MEDICINE | Facility: CLINIC | Age: 49
End: 2019-06-26
Payer: COMMERCIAL

## 2019-06-26 VITALS
WEIGHT: 252 LBS | DIASTOLIC BLOOD PRESSURE: 60 MMHG | RESPIRATION RATE: 20 BRPM | HEIGHT: 67 IN | SYSTOLIC BLOOD PRESSURE: 100 MMHG | HEART RATE: 103 BPM | TEMPERATURE: 98.3 F | BODY MASS INDEX: 39.55 KG/M2 | OXYGEN SATURATION: 95 %

## 2019-06-26 DIAGNOSIS — R73.01 IMPAIRED FASTING GLUCOSE: ICD-10-CM

## 2019-06-26 DIAGNOSIS — M1A.0790 IDIOPATHIC CHRONIC GOUT OF FOOT WITHOUT TOPHUS, UNSPECIFIED LATERALITY: ICD-10-CM

## 2019-06-26 DIAGNOSIS — I10 ESSENTIAL HYPERTENSION: ICD-10-CM

## 2019-06-26 DIAGNOSIS — E66.01 MORBID OBESITY WITH BMI OF 40.0-44.9, ADULT (H): ICD-10-CM

## 2019-06-26 DIAGNOSIS — Z12.5 PROSTATE CANCER SCREENING: ICD-10-CM

## 2019-06-26 DIAGNOSIS — E66.01 MORBID OBESITY (H): ICD-10-CM

## 2019-06-26 DIAGNOSIS — Z00.00 ROUTINE GENERAL MEDICAL EXAMINATION AT A HEALTH CARE FACILITY: Primary | ICD-10-CM

## 2019-06-26 DIAGNOSIS — M10.079 IDIOPATHIC GOUT OF FOOT, UNSPECIFIED CHRONICITY, UNSPECIFIED LATERALITY: ICD-10-CM

## 2019-06-26 DIAGNOSIS — E78.5 HYPERLIPIDEMIA LDL GOAL <100: ICD-10-CM

## 2019-06-26 PROCEDURE — 99396 PREV VISIT EST AGE 40-64: CPT | Performed by: FAMILY MEDICINE

## 2019-06-26 RX ORDER — COLCHICINE 0.6 MG/1
TABLET ORAL
Qty: 30 TABLET | Refills: 3 | Status: SHIPPED | OUTPATIENT
Start: 2019-06-26 | End: 2021-10-01

## 2019-06-26 RX ORDER — OLMESARTAN MEDOXOMIL, AMLODIPINE AND HYDROCHLOROTHIAZIDE TABLET 40/5/25 MG 40; 5; 25 MG/1; MG/1; MG/1
1 TABLET ORAL DAILY
Qty: 90 TABLET | Refills: 3 | Status: SHIPPED | OUTPATIENT
Start: 2019-06-26 | End: 2020-07-07

## 2019-06-26 ASSESSMENT — MIFFLIN-ST. JEOR: SCORE: 1953.75

## 2019-06-26 NOTE — PROGRESS NOTES
SUBJECTIVE:   CC: Waylon Santiago is an 48 year old male who presents for preventative health visit.     Healthy Habits:     Getting at least 3 servings of Calcium per day:  NO    Bi-annual eye exam:  Yes    Dental care twice a year:  Yes    Sleep apnea or symptoms of sleep apnea:  None    Diet:  Low salt and Carbohydrate counting    Frequency of exercise:  1 day/week    Duration of exercise:  Less than 15 minutes    Taking medications regularly:  Yes    Barriers to taking medications:  None    Medication side effects:  Other    PHQ-2 Total Score: 0    Additional concerns today:  No        Today's PHQ-2 Score:   PHQ-2 ( 1999 [employer]) 6/26/2019   Q1: Little interest or pleasure in doing things 0   Q2: Feeling down, depressed or hopeless 0   PHQ-2 Score 0   Q1: Little interest or pleasure in doing things Not at all   Q2: Feeling down, depressed or hopeless Not at all   PHQ-2 Score 0       Abuse: Current or Past(Physical, Sexual or Emotional)- No  Do you feel safe in your environment? Yes    Social History     Tobacco Use     Smoking status: Never Smoker     Smokeless tobacco: Never Used   Substance Use Topics     Alcohol use: Yes     Comment: occ.     Alcohol Use 6/26/2019   Prescreen: >3 drinks/day or >7 drinks/week? Yes   AUDIT SCORE  3     AUDIT - Alcohol Use Disorders Identification Test - Reproduced from the World Health Organization Audit 2001 (Second Edition) 6/26/2019   1.  How often do you have a drink containing alcohol? 2 to 3 times a week   2.  How many drinks containing alcohol do you have on a typical day when you are drinking? 1 or 2   3.  How often do you have five or more drinks on one occasion? Never   4.  How often during the last year have you found that you were not able to stop drinking once you had started? Never   5.  How often during the last year have you failed to do what was normally expected of you because of drinking? Never   6.  How often during the last year have you needed a first  drink in the morning to get yourself going after a heavy drinking session? Never   7.  How often during the last year have you had a feeling of guilt or remorse after drinking? Never   8.  How often during the last year have you been unable to remember what happened the night before because of your drinking? Never   9.  Have you or someone else been injured because of your drinking? No   10. Has a relative, friend, doctor or other health care worker been concerned about your drinking or suggested you cut down? No   TOTAL SCORE 3       Last PSA: No results found for: PSA    Reviewed orders with patient. Reviewed health maintenance and updated orders accordingly - Yes  Patient Active Problem List   Diagnosis     Essential hypertension     BMI of 40.0-44.9, adult     Hyperlipidemia LDL goal <100     Impaired fasting glucose     Abnormal results of liver function studies     Gout     Preventive measure     Morbid obesity (H)     Lipoma of skin and subcutaneous tissue     History reviewed. No pertinent surgical history.    Social History     Tobacco Use     Smoking status: Never Smoker     Smokeless tobacco: Never Used   Substance Use Topics     Alcohol use: Yes     Comment: occ.     Family History   Problem Relation Age of Onset     Diabetes Mother      Family History Negative Father         health hx unknown; smoker           Reviewed and updated as needed this visit by clinical staff  Tobacco  Allergies  Meds  Problems  Med Hx  Surg Hx  Fam Hx  Soc Hx          Reviewed and updated as needed this visit by Provider            Review of Systems  CONSTITUTIONAL: NEGATIVE for fever, chills, change in weight  INTEGUMENTARY/SKIN: NEGATIVE for worrisome rashes, moles or lesions  EYES: NEGATIVE for vision changes or irritation  ENT: NEGATIVE for ear, mouth and throat problems  RESP: NEGATIVE for significant cough or SOB  CV: NEGATIVE for chest pain, palpitations or peripheral edema  GI: NEGATIVE for nausea, abdominal  "pain, heartburn, or change in bowel habits   male: negative for dysuria, hematuria, decreased urinary stream, erectile dysfunction, urethral discharge  MUSCULOSKELETAL: NEGATIVE for significant arthralgias or myalgia  NEURO: NEGATIVE for weakness, dizziness or paresthesias  ENDOCRINE: NEGATIVE for temperature intolerance, skin/hair changes  HEME/ALLERGY/IMMUNE: NEGATIVE for bleeding problems  PSYCHIATRIC: NEGATIVE for changes in mood or affect    OBJECTIVE:   /60   Pulse 103   Temp 98.3  F (36.8  C) (Tympanic)   Resp 20   Ht 1.689 m (5' 6.5\")   Wt 114.3 kg (252 lb)   SpO2 95%   BMI 40.06 kg/m      Physical Exam  GENERAL: healthy, alert and no distress  EYES: Eyes grossly normal to inspection, PERRL and conjunctivae and sclerae normal  HENT: ear canals and TM's normal, nose and mouth without ulcers or lesions  NECK: no adenopathy, no asymmetry, masses, or scars and thyroid normal to palpation  RESP: lungs clear to auscultation - no rales, rhonchi or wheezes  CV: regular rate and rhythm, normal S1 S2, no S3 or S4, no murmur, click or rub, no peripheral edema and peripheral pulses strong  ABDOMEN: soft, nontender, no hepatosplenomegaly, no masses and bowel sounds normal   (male): normal male genitalia without lesions or urethral discharge, no hernia  RECTAL: normal sphincter tone, no rectal masses, prostate normal size, smooth, nontender without nodules or masses  MS: no gross musculoskeletal defects noted, no edema  SKIN: no suspicious lesions or rashes  NEURO: Normal strength and tone, mentation intact and speech normal  PSYCH: mentation appears normal, affect normal/bright  LYMPH: no cervical, supraclavicular, axillary, or inguinal adenopathy    Results for orders placed or performed in visit on 06/24/19   **A1C FUTURE 3mo   Result Value Ref Range    Hemoglobin A1C 5.4 0 - 5.6 %   Albumin Random Urine Quantitative with Creat Ratio   Result Value Ref Range    Creatinine Urine 220 mg/dL    Albumin " "Urine mg/L 9 mg/L    Albumin Urine mg/g Cr 4.30 0 - 17 mg/g Cr       ASSESSMENT/PLAN:       ICD-10-CM    1. Routine general medical examination at a health care facility Z00.00 GASTROENTEROLOGY ADULT REF PROCEDURE ONLY Other; [place] [facility] [phone] ([place])   2. Impaired fasting glucose R73.01 metFORMIN (GLUCOPHAGE) 500 MG tablet   3. Morbid obesity (H) E66.01    4. Essential hypertension I10 Comprehensive metabolic panel     CBC with platelets differential     UA with Microscopic     Olmesartan-amLODIPine-HCTZ 40-5-25 MG TABS   5. BMI of 40.0-44.9, adult E66.01     Z68.41    6. Hyperlipidemia LDL goal <100 E78.5 Lipid Profile   7. Idiopathic gout of foot, unspecified chronicity, unspecified laterality M10.079 Uric acid   8. Prostate cancer screening Z12.5 Prostate spec antigen screen   9. Idiopathic chronic gout of foot without tophus, unspecified laterality M1A.0790 colchicine (COLCRYS) 0.6 MG tablet       COUNSELING:   Reviewed preventive health counseling, as reflected in patient instructions       Regular exercise       Healthy diet/nutrition       Colon cancer screening       Prostate cancer screening    Estimated body mass index is 40.06 kg/m  as calculated from the following:    Height as of this encounter: 1.689 m (5' 6.5\").    Weight as of this encounter: 114.3 kg (252 lb).     Weight management plan: Discussed healthy diet and exercise guidelines     reports that he has never smoked. He has never used smokeless tobacco.      Counseling Resources:  ATP IV Guidelines  Pooled Cohorts Equation Calculator  FRAX Risk Assessment  ICSI Preventive Guidelines  Dietary Guidelines for Americans, 2010  USDA's MyPlate  ASA Prophylaxis  Lung CA Screening    Maynor Johnson MD  "

## 2019-06-26 NOTE — NURSING NOTE
"Chief Complaint   Patient presents with     Physical     /60   Pulse 103   Temp 98.3  F (36.8  C) (Tympanic)   Resp 20   Ht 1.689 m (5' 6.5\")   Wt 114.3 kg (252 lb)   SpO2 95%   BMI 40.06 kg/m   Estimated body mass index is 40.06 kg/m  as calculated from the following:    Height as of this encounter: 1.689 m (5' 6.5\").    Weight as of this encounter: 114.3 kg (252 lb).  BP completed using cuff size: shawn Martinez CMA    Health Maintenance Due   Topic Date Due     DIABETIC FOOT EXAM  1970     EYE EXAM  1970     COLONOSCOPY  12/02/1978     HIV SCREENING  12/02/1983     TSH W/FREE T4 REFLEX  03/08/2018     ZOSTER IMMUNIZATION (1 of 2) 12/02/2018     Health Maintenance reviewed at today's visit patient asked to schedule/complete:   Routine Health Visit: Completed at today's visit.  Colon Cancer:  Patient agrees to schedule  Diabetes:  Patient agrees to schedule  Immunizations:  Patient agrees to schedule    "

## 2019-06-27 NOTE — RESULT ENCOUNTER NOTE
Dear Waylon,    Your tests were all normal. A copy of your tests are available in My Chart.    Glad to see you at your appointment.  If you have any questions feel free to call.      Sincerely,      URIEL Resendez.

## 2019-06-28 DIAGNOSIS — E78.5 HYPERLIPIDEMIA LDL GOAL <100: ICD-10-CM

## 2019-06-28 DIAGNOSIS — I10 ESSENTIAL HYPERTENSION: ICD-10-CM

## 2019-06-28 DIAGNOSIS — Z12.5 PROSTATE CANCER SCREENING: ICD-10-CM

## 2019-06-28 DIAGNOSIS — M10.079 IDIOPATHIC GOUT OF FOOT, UNSPECIFIED CHRONICITY, UNSPECIFIED LATERALITY: ICD-10-CM

## 2019-06-28 LAB
ALBUMIN SERPL-MCNC: 4 G/DL (ref 3.4–5)
ALBUMIN UR-MCNC: NEGATIVE MG/DL
ALP SERPL-CCNC: 91 U/L (ref 40–150)
ALT SERPL W P-5'-P-CCNC: 80 U/L (ref 0–70)
ANION GAP SERPL CALCULATED.3IONS-SCNC: 9 MMOL/L (ref 3–14)
APPEARANCE UR: CLEAR
AST SERPL W P-5'-P-CCNC: 33 U/L (ref 0–45)
BASOPHILS # BLD AUTO: 0 10E9/L (ref 0–0.2)
BASOPHILS NFR BLD AUTO: 0.4 %
BILIRUB SERPL-MCNC: 0.4 MG/DL (ref 0.2–1.3)
BILIRUB UR QL STRIP: NEGATIVE
BUN SERPL-MCNC: 23 MG/DL (ref 7–30)
CALCIUM SERPL-MCNC: 8.7 MG/DL (ref 8.5–10.1)
CHLORIDE SERPL-SCNC: 107 MMOL/L (ref 94–109)
CHOLEST SERPL-MCNC: 127 MG/DL
CO2 SERPL-SCNC: 25 MMOL/L (ref 20–32)
COLOR UR AUTO: YELLOW
CREAT SERPL-MCNC: 0.91 MG/DL (ref 0.66–1.25)
DIFFERENTIAL METHOD BLD: ABNORMAL
EOSINOPHIL # BLD AUTO: 0.2 10E9/L (ref 0–0.7)
EOSINOPHIL NFR BLD AUTO: 3.2 %
ERYTHROCYTE [DISTWIDTH] IN BLOOD BY AUTOMATED COUNT: 13.2 % (ref 10–15)
GFR SERPL CREATININE-BSD FRML MDRD: >90 ML/MIN/{1.73_M2}
GLUCOSE SERPL-MCNC: 95 MG/DL (ref 70–99)
GLUCOSE UR STRIP-MCNC: NEGATIVE MG/DL
HCT VFR BLD AUTO: 42.5 % (ref 40–53)
HDLC SERPL-MCNC: 19 MG/DL
HGB BLD-MCNC: 15 G/DL (ref 13.3–17.7)
HGB UR QL STRIP: NEGATIVE
KETONES UR STRIP-MCNC: NEGATIVE MG/DL
LDLC SERPL CALC-MCNC: 71 MG/DL
LEUKOCYTE ESTERASE UR QL STRIP: NEGATIVE
LYMPHOCYTES # BLD AUTO: 1.7 10E9/L (ref 0.8–5.3)
LYMPHOCYTES NFR BLD AUTO: 24.6 %
MCH RBC QN AUTO: 33.3 PG (ref 26.5–33)
MCHC RBC AUTO-ENTMCNC: 35.3 G/DL (ref 31.5–36.5)
MCV RBC AUTO: 94 FL (ref 78–100)
MONOCYTES # BLD AUTO: 0.6 10E9/L (ref 0–1.3)
MONOCYTES NFR BLD AUTO: 8.9 %
NEUTROPHILS # BLD AUTO: 4.3 10E9/L (ref 1.6–8.3)
NEUTROPHILS NFR BLD AUTO: 62.9 %
NITRATE UR QL: NEGATIVE
NONHDLC SERPL-MCNC: 108 MG/DL
PH UR STRIP: 5.5 PH (ref 5–7)
PLATELET # BLD AUTO: 250 10E9/L (ref 150–450)
POTASSIUM SERPL-SCNC: 3.7 MMOL/L (ref 3.4–5.3)
PROT SERPL-MCNC: 7.3 G/DL (ref 6.8–8.8)
PSA SERPL-ACNC: 0.7 UG/L (ref 0–4)
RBC # BLD AUTO: 4.5 10E12/L (ref 4.4–5.9)
RBC #/AREA URNS AUTO: NORMAL /HPF
SODIUM SERPL-SCNC: 141 MMOL/L (ref 133–144)
SOURCE: NORMAL
SP GR UR STRIP: 1.02 (ref 1–1.03)
TRIGL SERPL-MCNC: 186 MG/DL
URATE SERPL-MCNC: 5.7 MG/DL (ref 3.5–7.2)
UROBILINOGEN UR STRIP-ACNC: 0.2 EU/DL (ref 0.2–1)
WBC # BLD AUTO: 6.8 10E9/L (ref 4–11)
WBC #/AREA URNS AUTO: NORMAL /HPF

## 2019-06-28 PROCEDURE — G0103 PSA SCREENING: HCPCS | Performed by: FAMILY MEDICINE

## 2019-06-28 PROCEDURE — 36415 COLL VENOUS BLD VENIPUNCTURE: CPT | Performed by: FAMILY MEDICINE

## 2019-06-28 PROCEDURE — 80061 LIPID PANEL: CPT | Performed by: FAMILY MEDICINE

## 2019-06-28 PROCEDURE — 85025 COMPLETE CBC W/AUTO DIFF WBC: CPT | Performed by: FAMILY MEDICINE

## 2019-06-28 PROCEDURE — 80053 COMPREHEN METABOLIC PANEL: CPT | Performed by: FAMILY MEDICINE

## 2019-06-28 PROCEDURE — 81001 URINALYSIS AUTO W/SCOPE: CPT | Performed by: FAMILY MEDICINE

## 2019-06-28 PROCEDURE — 84550 ASSAY OF BLOOD/URIC ACID: CPT | Performed by: FAMILY MEDICINE

## 2019-10-05 DIAGNOSIS — I10 ESSENTIAL HYPERTENSION: ICD-10-CM

## 2019-10-05 NOTE — TELEPHONE ENCOUNTER
"Requested Prescriptions   Pending Prescriptions Disp Refills     Olmesartan-amLODIPine-HCTZ 40-5-25 MG TABS [Pharmacy Med Name: OLMESARTAN/AMLOD/HCTZ 40-5-25MG T]    Last Written Prescription Date:  06/26/2019  Last Fill Quantity: 90 tablet,  # refills: 3   Last office visit: 6/26/2019 with prescribing provider:  Elizabeth   Future Office Visit:     90 tablet 0     Sig: TAKE 1 TABLET BY MOUTH DAILY       Angiotensin-II Receptors Passed - 10/5/2019  3:32 AM        Passed - Last blood pressure under 140/90 in past 12 months     BP Readings from Last 3 Encounters:   06/26/19 100/60   12/17/18 110/70   11/19/18 109/72                 Passed - Recent (12 mo) or future (30 days) visit within the authorizing provider's specialty     Patient has had an office visit with the authorizing provider or a provider within the authorizing providers department within the previous 12 mos or has a future within next 30 days. See \"Patient Info\" tab in inbasket, or \"Choose Columns\" in Meds & Orders section of the refill encounter.              Passed - Medication is active on med list        Passed - Patient is age 18 or older        Passed - Normal serum creatinine on file in past 12 months     Recent Labs   Lab Test 06/28/19  0800   CR 0.91             Passed - Normal serum potassium on file in past 12 months     Recent Labs   Lab Test 06/28/19  0800   POTASSIUM 3.7                       "

## 2019-10-07 RX ORDER — OLMESARTAN MEDOXOMIL, AMLODIPINE AND HYDROCHLOROTHIAZIDE TABLET 40/5/25 MG 40; 5; 25 MG/1; MG/1; MG/1
TABLET ORAL
Qty: 90 TABLET | Refills: 0 | OUTPATIENT
Start: 2019-10-07

## 2019-10-08 ENCOUNTER — TELEPHONE (OUTPATIENT)
Dept: FAMILY MEDICINE | Facility: CLINIC | Age: 49
End: 2019-10-08

## 2019-10-08 DIAGNOSIS — Z01.20 VISIT FOR DENTAL EXAMINATION: Primary | ICD-10-CM

## 2019-10-08 NOTE — TELEPHONE ENCOUNTER
Patient calling stating he is getting a tooth extraction and implant.  Patient was advised by dentist he needs a basic chemistry profile and a Prothrombin Time IP.    Patient can be reached at: [phone], ok to leave a detailed message.  Patient would like results faxed to: [phone] [facility]    Routing to covering providers for review and advise as appropriate.    SUSIE ChicasN, RN  [employer] [facility]

## 2019-10-08 NOTE — TELEPHONE ENCOUNTER
Patient was notified with information noted by provider and agreed with plan.  Advised patient to contact dental office regarding lab CMP sent to see if that is sufficient.  Patient will notify lab staff tomorrow if he does not need the BMP.  SUSIE PondN, RN  [employer] [facility]

## 2019-10-09 DIAGNOSIS — Z01.20 VISIT FOR DENTAL EXAMINATION: ICD-10-CM

## 2019-10-09 LAB — APTT PPP: 33 SEC (ref 22–37)

## 2019-10-09 PROCEDURE — 36415 COLL VENOUS BLD VENIPUNCTURE: CPT | Performed by: PHYSICIAN ASSISTANT

## 2019-10-09 PROCEDURE — 85730 THROMBOPLASTIN TIME PARTIAL: CPT | Performed by: PHYSICIAN ASSISTANT

## 2019-10-09 NOTE — LETTER
October 10, 2019      Waylon Santiago  [address] [facility] [address]      faxed to: [phone] [facility]      Dear ,  We are writing to inform you of your test results.  Orders Only on 06/28/2019   Component Date Value Ref Range Status     Sodium 06/28/2019 141  133 - 144 mmol/L Final     Potassium 06/28/2019 3.7  3.4 - 5.3 mmol/L Final     Chloride 06/28/2019 107  94 - 109 mmol/L Final     Carbon Dioxide 06/28/2019 25  20 - 32 mmol/L Final     Anion Gap 06/28/2019 9  3 - 14 mmol/L Final     Glucose 06/28/2019 95  70 - 99 mg/dL Final     Urea Nitrogen 06/28/2019 23  7 - 30 mg/dL Final     Creatinine 06/28/2019 0.91  0.66 - 1.25 mg/dL Final     GFR Estimate 06/28/2019 >90  >60 mL/min/[1.73_m2] Final     Calcium 06/28/2019 8.7  8.5 - 10.1 mg/dL Final     Bilirubin Total 06/28/2019 0.4  0.2 - 1.3 mg/dL Final     Albumin 06/28/2019 4.0  3.4 - 5.0 g/dL Final     Protein Total 06/28/2019 7.3  6.8 - 8.8 g/dL Final     Alkaline Phosphatase 06/28/2019 91  40 - 150 U/L Final     ALT 06/28/2019 80* 0 - 70 U/L Final     AST 06/28/2019 33  0 - 45 U/L Final      Resulted Orders   Partial thromboplastin time   Result Value Ref Range    PTT 33 22 - 37 sec     If you have any questions or concerns, please call the clinic at the number listed above.     Sincerely,      Anai Bhatti PA-C

## 2019-10-10 NOTE — RESULT ENCOUNTER NOTE
Carlos A Connors,    I just wanted to let you know that your lab results have been reviewed and are attached.    - Your lab results look great; everything is normal.    Please let me know if you have any questions and have a great week!    Sincerely,    Anai Bhatti PA-C    [facility]  [address]  [place] [address]  [phone] (p)

## 2019-11-04 ENCOUNTER — HEALTH MAINTENANCE LETTER (OUTPATIENT)
Age: 49
End: 2019-11-04

## 2020-07-07 DIAGNOSIS — R73.01 IMPAIRED FASTING GLUCOSE: ICD-10-CM

## 2020-07-07 DIAGNOSIS — I10 ESSENTIAL HYPERTENSION: ICD-10-CM

## 2020-07-07 RX ORDER — OLMESARTAN MEDOXOMIL, AMLODIPINE AND HYDROCHLOROTHIAZIDE TABLET 40/5/25 MG 40; 5; 25 MG/1; MG/1; MG/1
1 TABLET ORAL DAILY
Qty: 90 TABLET | Refills: 0 | Status: SHIPPED | OUTPATIENT
Start: 2020-07-07 | End: 2020-08-18

## 2020-07-07 NOTE — TELEPHONE ENCOUNTER
Called pt and he is willing to schedule a virtual visit but is going on vacation.  Pt is going to call when he gets back from vacation to schedule, will be the end of July or beginning of August.  Please send a isabella refill to get pt through the month.

## 2020-07-26 NOTE — PROGRESS NOTES
"  SUBJECTIVE:   Waylon Santiago is a 47 year old male who presents to clinic today for the following health issues:      Musculoskeletal problem/pain      Duration: months    Description  Location: high uric acid levels    Intensity:  moderate    Accompanying signs and symptoms: see above    History  Previous similar problem: no   Previous evaluation:  orthopedic evaluation for feet    Precipitating or alleviating factors:  Trauma or overuse: no   Aggravating factors include: none    Therapies tried and outcome: allopurinol       Hypertension Follow-up      Outpatient blood pressures are not being checked.    Low Salt Diet: no added salt      Problem list and histories reviewed & adjusted, as indicated.  Additional history: as documented    Patient Active Problem List   Diagnosis     Essential hypertension     BMI of 40.0-44.9, adult     Hyperlipidemia LDL goal <100     Impaired fasting glucose     Abnormal results of liver function studies     Gout     Preventive measure     Morbid obesity (H)     Lipoma of skin and subcutaneous tissue     No past surgical history on file.    Social History   Substance Use Topics     Smoking status: Never Smoker     Smokeless tobacco: Never Used     Alcohol use Yes      Comment: occ.     Family History   Problem Relation Age of Onset     DIABETES Mother      Family History Negative Father      health hx unknown; smoker           Reviewed and updated as needed this visit by clinical staff  Tobacco  Meds       Reviewed and updated as needed this visit by Provider         ROS:  Constitutional, HEENT, cardiovascular, pulmonary, gi and gu systems are negative, except as otherwise noted.    OBJECTIVE:                                                    /78 (Cuff Size: Adult Large)  Pulse 68  Resp 16  Ht 5' 6.5\" (1.689 m)  Wt 260 lb (117.9 kg)  BMI 41.34 kg/m2  Body mass index is 41.34 kg/(m^2).  GENERAL APPEARANCE: healthy, alert and no distress    Diagnostic test " results:  Results for orders placed or performed in visit on 06/08/18   Lipid panel reflex to direct LDL Fasting   Result Value Ref Range    Cholesterol 137 <200 mg/dL    Triglycerides 162 (H) <150 mg/dL    HDL Cholesterol 18 (L) >39 mg/dL    LDL Cholesterol Calculated 87 <100 mg/dL    Non HDL Cholesterol 119 <130 mg/dL   Uric acid   Result Value Ref Range    Uric Acid 6.7 3.5 - 7.2 mg/dL   Comprehensive metabolic panel   Result Value Ref Range    Sodium 138 133 - 144 mmol/L    Potassium 3.9 3.4 - 5.3 mmol/L    Chloride 104 94 - 109 mmol/L    Carbon Dioxide 26 20 - 32 mmol/L    Anion Gap 8 3 - 14 mmol/L    Glucose 113 (H) 70 - 99 mg/dL    Urea Nitrogen 23 7 - 30 mg/dL    Creatinine 1.02 0.66 - 1.25 mg/dL    GFR Estimate 77 >60 mL/min/1.7m2    GFR Estimate If Black >90 >60 mL/min/1.7m2    Calcium 8.9 8.5 - 10.1 mg/dL    Bilirubin Total 0.6 0.2 - 1.3 mg/dL    Albumin 4.3 3.4 - 5.0 g/dL    Protein Total 7.8 6.8 - 8.8 g/dL    Alkaline Phosphatase 90 40 - 150 U/L    ALT 81 (H) 0 - 70 U/L    AST 38 0 - 45 U/L   Hemoglobin A1c   Result Value Ref Range    Hemoglobin A1C 5.4 0 - 5.6 %        ASSESSMENT/PLAN:                                                        ICD-10-CM    1. Essential hypertension I10    2. Idiopathic chronic gout of foot without tophus, unspecified laterality M1A.0790 allopurinol (ZYLOPRIM) 300 MG tablet   3. Impaired fasting glucose R73.01 metFORMIN (GLUCOPHAGE) 500 MG tablet   4. Hypertension goal BP (blood pressure) < 140/90 I10 Olmesartan-Amlodipine-HCTZ 40-5-25 MG TABS       Patient Instructions   I will see the patient back in 2 months.  He will need lab testing done at that time.  I refilled his allopurinol metformin and blood pressure medications all for 90 days.  Follow-up otherwise will be as needed.      Maynor Johnson MD  [facility]     Statement Selected

## 2020-08-18 ENCOUNTER — OFFICE VISIT (OUTPATIENT)
Dept: FAMILY MEDICINE | Facility: CLINIC | Age: 50
End: 2020-08-18
Payer: COMMERCIAL

## 2020-08-18 VITALS
HEART RATE: 91 BPM | SYSTOLIC BLOOD PRESSURE: 120 MMHG | BODY MASS INDEX: 40.7 KG/M2 | TEMPERATURE: 98 F | DIASTOLIC BLOOD PRESSURE: 76 MMHG | OXYGEN SATURATION: 96 % | WEIGHT: 256 LBS | RESPIRATION RATE: 16 BRPM

## 2020-08-18 DIAGNOSIS — D17.30 LIPOMA OF SKIN AND SUBCUTANEOUS TISSUE: ICD-10-CM

## 2020-08-18 DIAGNOSIS — Z11.4 ENCOUNTER FOR SCREENING FOR HIV: ICD-10-CM

## 2020-08-18 DIAGNOSIS — R73.01 IMPAIRED FASTING GLUCOSE: ICD-10-CM

## 2020-08-18 DIAGNOSIS — I10 ESSENTIAL HYPERTENSION: ICD-10-CM

## 2020-08-18 DIAGNOSIS — Z23 NEED FOR VACCINATION: ICD-10-CM

## 2020-08-18 DIAGNOSIS — Z12.11 ENCOUNTER FOR SCREENING FOR MALIGNANT NEOPLASM OF COLON: ICD-10-CM

## 2020-08-18 DIAGNOSIS — E78.5 HYPERLIPIDEMIA LDL GOAL <100: ICD-10-CM

## 2020-08-18 DIAGNOSIS — M75.02 ADHESIVE CAPSULITIS OF LEFT SHOULDER: Primary | ICD-10-CM

## 2020-08-18 PROCEDURE — 99214 OFFICE O/P EST MOD 30 MIN: CPT | Mod: 25 | Performed by: INTERNAL MEDICINE

## 2020-08-18 PROCEDURE — 90471 IMMUNIZATION ADMIN: CPT | Performed by: INTERNAL MEDICINE

## 2020-08-18 PROCEDURE — 90750 HZV VACC RECOMBINANT IM: CPT | Performed by: INTERNAL MEDICINE

## 2020-08-18 RX ORDER — OLMESARTAN MEDOXOMIL, AMLODIPINE AND HYDROCHLOROTHIAZIDE TABLET 40/5/25 MG 40; 5; 25 MG/1; MG/1; MG/1
1 TABLET ORAL DAILY
Qty: 90 TABLET | Refills: 1 | Status: SHIPPED | OUTPATIENT
Start: 2020-08-18 | End: 2021-04-22

## 2020-08-18 RX ORDER — INFLUENZA A VIRUS A/NEBRASKA/14/2019 (H1N1) ANTIGEN (MDCK CELL DERIVED, PROPIOLACTONE INACTIVATED), INFLUENZA A VIRUS A/DELAWARE/39/2019 (H3N2) ANTIGEN (MDCK CELL DERIVED, PROPIOLACTONE INACTIVATED), INFLUENZA B VIRUS B/SINGAPORE/INFTT-16-0610/2016 ANTIGEN (MDCK CELL DERIVED, PROPIOLACTONE INACTIVATED), INFLUENZA B VIRUS B/DARWIN/7/2019 ANTIGEN (MDCK CELL DERIVED, PROPIOLACTONE INACTIVATED) 15; 15; 15; 15 UG/.5ML; UG/.5ML; UG/.5ML; UG/.5ML
INJECTION, SUSPENSION INTRAMUSCULAR
Refills: 0 | COMMUNITY
Start: 2019-09-12 | End: 2021-10-01

## 2020-08-18 NOTE — PATIENT INSTRUCTIONS
Please do the lab work given today.     Medications sent to your pharmacy.     Lipoma needs Ultrasound before placing orders for general surgery.     [facility]  ( [facility] )   Call : [phone]  Toll Free : [phone]    ==============================    [facility]  ( [place], [facility] )   [facility]  Call : [phone]  Toll Free : [phone]    ==============================  [facility]  Phone : [phone]    ===============================    [facility]   ( All Locations )   Call : [phone]  Toll Free : [phone]        =====================    Patient Education     Understanding Frozen Shoulder    Frozen shoulder is a condition where the shoulder becomes painful and hard to move. The condition is sometimes called adhesive capsulitis.  The shoulder is a joint that is made up of many parts. These parts allow you to raise, rotate, and swing your arm. The parts of a normal shoulder are:    Humeral head. The ball at the top of the upper arm bone (humerus).    Scapula. The shoulder blade.    Glenoid. The shallow socket on the scapula. (The humeral head rests on the glenoid.)    Capsule. A sheet of tough tissue that encloses the joint and joins the ball to the socket.  With frozen shoulder, the capsule thickens, and shrinks and pulls in (contracts). It is not clear why this happens. It may be from swelling and irritation, or from scar tissue forming. Over time, this may result in pain, stiffness, and loss of movement in the shoulder.  What causes frozen shoulder?  Experts don t know for sure why frozen shoulder occurs. Some things can make the condition more likely. These include:    Being a woman    Being 40 to 60 years old    Having certain health conditions, such as diabetes or thyroid disease    Taking certain medicines    Not using the shoulder for a prolonged period of time, such as after an  injury or surgery  Symptoms of frozen shoulder  Frozen shoulder typically occurs in 3 stages. Each stage will vary, but often lasts a few months or longer:    Freezing stage. The shoulder is very painful. Pain often gets worse when moving your arm and at night during sleep. The shoulder gradually becomes stiffer.    Frozen stage. The shoulder is very stiff and hard to move. Pain may be less than in the first stage. It may be hard to do daily tasks, such as dressing or bathing.    Thawing stage. Pain and stiffness slowly get better. In time, normal or almost normal use of the shoulder returns.  Treatment for frozen shoulder  Most cases of frozen shoulder get better, even with no treatment. Treatment is done to help speed healing and help regain as much joint movement as possible. The best course of treatment will depend on your needs. It may include one or more of the following:    Prescription or over-the-counter pain medicines. These help relieve pain and reduce swelling.    Stretching exercises. These help restore movement to the shoulder. You may do them on your own or under the care of a physical therapist.    Cortisone shots. These are given into your shoulder joint. They can t cure frozen shoulder. But they can help give short-term relief from symptoms and allow you to do exercises without too much pain.    Cold packs and heat packs. These can help relieve symptoms.  Keep in mind that getting better is a slow process. It can take a year or longer. If your shoulder doesn t get better on its own in this time, you may need surgery. This is done to loosen the tight tissues in the shoulder joint.  When to call your healthcare provider  Call your healthcare provider right away if you have any of these:    Fever of 100.4 F (38 C) or higher, or as directed    Symptoms that don t get better, or get worse    New symptoms   Date Last Reviewed: 3/10/2016    [id number] [employer]. [address] [facility],  [place] [address]. All rights reserved. This information is not intended as a substitute for professional medical care. Always follow your healthcare professional's instructions.

## 2020-08-18 NOTE — ASSESSMENT & PLAN NOTE
New problem added to patient list today- began 6-8 months ago without definite injury. He has had pain over the lateral aspect of the shoulder and hurts with lifting his arm and lying on the shoulder. Pain is 1/10 to 9/10. He has had no treatment in the last 6-8 months. 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 He has no history of left shoulder problems in the past.    Followed  at [facility], discussions for possible intra-articular glenohumeral injection guided in their radiology department if PT is not helping 4 weeks.

## 2020-08-18 NOTE — PROGRESS NOTES
Subjective     Waylon Santiago is a 49 year old male who presents to clinic today for the following health issues:    HPI       Musculoskeletal problem/pain  Frozen shoulder      Duration: 6-9 months    Description  Location: left shoulder    Intensity:  moderate    Accompanying signs and symptoms: aching    History  Previous similar problem: YES  Previous evaluation:  orthopedic evaluation (has been to [facility] )    Precipitating or alleviating factors:  Trauma or overuse: YES  Aggravating factors include: lifting and reaching    Therapies tried and outcome: nothing and physical therapy    Medication Followup of allopurinal, olmesartan-amlodipine-HCTZ    Taking Medication as prescribed: yes    Side Effects:  None    Medication Helping Symptoms:  yes       Patient Active Problem List   Diagnosis     Essential hypertension     BMI of 40.0-44.9, adult     Hyperlipidemia LDL goal <100     Impaired fasting glucose     Abnormal results of liver function studies     Gout     Preventive measure     Morbid obesity (H)     Lipoma of skin and subcutaneous tissue     Adhesive capsulitis of left shoulder     History reviewed. No pertinent surgical history.    Social History     Tobacco Use     Smoking status: Never Smoker     Smokeless tobacco: Never Used   Substance Use Topics     Alcohol use: Yes     Comment: occ.     Family History   Problem Relation Age of Onset     Diabetes Mother      Family History Negative Father         health hx unknown; smoker           Reviewed and updated as needed this visit by Provider         Review of Systems   Constitutional, HEENT, cardiovascular, pulmonary, GI, , musculoskeletal, neuro, skin, endocrine and psych systems are negative, except as otherwise noted.      Objective    /76   Pulse 91   Temp 98  F (36.7  C) (Tympanic)   Resp 16   Wt 116.1 kg (256 lb)   SpO2 96%   BMI 40.70 kg/m    Body mass index is 40.7 kg/m .  Physical Exam   GENERAL: healthy, alert and no  distress  NECK: no adenopathy, no asymmetry, masses, or scars and thyroid normal to palpation  RESP: lungs clear to auscultation - no rales, rhonchi or wheezes  CV: regular rate and rhythm, normal S1 S2, no S3 or S4, no murmur, click or rub, no peripheral edema and peripheral pulses strong  ABDOMEN: soft, nontender, no hepatosplenomegaly, no masses and bowel sounds normal  MS: no gross musculoskeletal defects noted, no edema  LEFT shoulder exam : POSITIVE for left shoulder tenderness on the Acromion and upper end of humerus. Pain ful arc positive at 60 degrees from vertical.  SKIN EXAM : POSITIVE for Lipoma measuring 15 cm in diameter on the Left side of nape of neck.    Diagnostic Test Results:  Labs reviewed in Epic        Assessment and Plan  1. Adhesive capsulitis of left shoulder  - CBC with platelets FUTURE anytime; Future    2. Hyperlipidemia LDL goal <100  - Lipid panel reflex to direct LDL Fasting; Future  - Comprehensive metabolic panel FUTURE anytime; Future    3. Impaired fasting glucose  - metFORMIN (GLUCOPHAGE) 500 MG tablet; Take 1 tab twice a day  Dispense: 180 tablet; Refill: 1  - A1C FUTURE anytime; Future    4. Essential hypertension  - Olmesartan-amLODIPine-HCTZ 40-5-25 MG TABS; Take 1 tablet by mouth daily  Dispense: 90 tablet; Refill: 1  - Comprehensive metabolic panel FUTURE anytime; Future    5. Encounter for screening for malignant neoplasm of colon  - Fecal colorectal cancer screen (FIT); Future    6. Lipoma of skin and subcutaneous tissue  Plans for general surgery referral once we see the [facility] reports. Pt understood and agreed with the plan.  - US Head Neck Soft Tissue; Future    7. Encounter for screening for HIV  - HIV Antigen Antibody Combo; Future    8. Need for vaccination  - ZOSTER VACCINE RECOMBINANT ADJUVANTED IM NJX     Patient Instructions     Please do the lab work given today.     Medications sent to your pharmacy.     Lipoma needs Ultrasound before placing orders for general  surgery.     [facility]  ( [facility] )   Call : [phone]  Toll Free : [phone]    ==============================    [facility]  ( [facility] )   [facility]  Call : [phone]  Toll Free : [phone]    ==============================  [facility] [place]  Phone : [phone]    ===============================    [facility]   ( All Locations )   Call : [phone]  Toll Free : [phone]        =====================    Patient Education     Understanding Frozen Shoulder    Frozen shoulder is a condition where the shoulder becomes painful and hard to move. The condition is sometimes called adhesive capsulitis.  The shoulder is a joint that is made up of many parts. These parts allow you to raise, rotate, and swing your arm. The parts of a normal shoulder are:    Humeral head. The ball at the top of the upper arm bone (humerus).    Scapula. The shoulder blade.    Glenoid. The shallow socket on the scapula. (The humeral head rests on the glenoid.)    Capsule. A sheet of tough tissue that encloses the joint and joins the ball to the socket.  With frozen shoulder, the capsule thickens, and shrinks and pulls in (contracts). It is not clear why this happens. It may be from swelling and irritation, or from scar tissue forming. Over time, this may result in pain, stiffness, and loss of movement in the shoulder.  What causes frozen shoulder?  Experts don t know for sure why frozen shoulder occurs. Some things can make the condition more likely. These include:    Being a woman    Being 40 to 60 years old    Having certain health conditions, such as diabetes or thyroid disease    Taking certain medicines    Not using the shoulder for a prolonged period of time, such as after an injury or surgery  Symptoms of frozen shoulder  Frozen shoulder typically occurs in 3 stages. Each stage will vary, but often lasts a  few months or longer:    Freezing stage. The shoulder is very painful. Pain often gets worse when moving your arm and at night during sleep. The shoulder gradually becomes stiffer.    Frozen stage. The shoulder is very stiff and hard to move. Pain may be less than in the first stage. It may be hard to do daily tasks, such as dressing or bathing.    Thawing stage. Pain and stiffness slowly get better. In time, normal or almost normal use of the shoulder returns.  Treatment for frozen shoulder  Most cases of frozen shoulder get better, even with no treatment. Treatment is done to help speed healing and help regain as much joint movement as possible. The best course of treatment will depend on your needs. It may include one or more of the following:    Prescription or over-the-counter pain medicines. These help relieve pain and reduce swelling.    Stretching exercises. These help restore movement to the shoulder. You may do them on your own or under the care of a physical therapist.    Cortisone shots. These are given into your shoulder joint. They can t cure frozen shoulder. But they can help give short-term relief from symptoms and allow you to do exercises without too much pain.    Cold packs and heat packs. These can help relieve symptoms.  Keep in mind that getting better is a slow process. It can take a year or longer. If your shoulder doesn t get better on its own in this time, you may need surgery. This is done to loosen the tight tissues in the shoulder joint.  When to call your healthcare provider  Call your healthcare provider right away if you have any of these:    Fever of 100.4 F (38 C) or higher, or as directed    Symptoms that don t get better, or get worse    New symptoms   Date Last Reviewed: 3/10/2016    7658-2060 [employer]. [address] [place] [address]. All rights reserved. This information is not intended as a substitute for professional medical care. Always follow your  healthcare professional's instructions.             Return in about 6 months (around 2/18/2021), or if symptoms worsen or fail to improve.    Lea Arreola MD  [facility]

## 2020-08-20 DIAGNOSIS — M75.02 ADHESIVE CAPSULITIS OF LEFT SHOULDER: ICD-10-CM

## 2020-08-20 DIAGNOSIS — I10 ESSENTIAL HYPERTENSION: ICD-10-CM

## 2020-08-20 DIAGNOSIS — R73.01 IMPAIRED FASTING GLUCOSE: ICD-10-CM

## 2020-08-20 DIAGNOSIS — E78.5 HYPERLIPIDEMIA LDL GOAL <100: ICD-10-CM

## 2020-08-20 DIAGNOSIS — Z11.4 ENCOUNTER FOR SCREENING FOR HIV: ICD-10-CM

## 2020-08-20 LAB
ALBUMIN SERPL-MCNC: 3.8 G/DL (ref 3.4–5)
ALP SERPL-CCNC: 92 U/L (ref 40–150)
ALT SERPL W P-5'-P-CCNC: 68 U/L (ref 0–70)
ANION GAP SERPL CALCULATED.3IONS-SCNC: 7 MMOL/L (ref 3–14)
AST SERPL W P-5'-P-CCNC: 26 U/L (ref 0–45)
BILIRUB SERPL-MCNC: 0.5 MG/DL (ref 0.2–1.3)
BUN SERPL-MCNC: 17 MG/DL (ref 7–30)
CALCIUM SERPL-MCNC: 8.7 MG/DL (ref 8.5–10.1)
CHLORIDE SERPL-SCNC: 103 MMOL/L (ref 94–109)
CHOLEST SERPL-MCNC: 121 MG/DL
CO2 SERPL-SCNC: 26 MMOL/L (ref 20–32)
CREAT SERPL-MCNC: 0.97 MG/DL (ref 0.66–1.25)
ERYTHROCYTE [DISTWIDTH] IN BLOOD BY AUTOMATED COUNT: 12.7 % (ref 10–15)
GFR SERPL CREATININE-BSD FRML MDRD: 90 ML/MIN/{1.73_M2}
GLUCOSE SERPL-MCNC: 100 MG/DL (ref 70–99)
HBA1C MFR BLD: 5 % (ref 0–5.6)
HCT VFR BLD AUTO: 43 % (ref 40–53)
HDLC SERPL-MCNC: 20 MG/DL
HGB BLD-MCNC: 14.4 G/DL (ref 13.3–17.7)
LDLC SERPL CALC-MCNC: 64 MG/DL
MCH RBC QN AUTO: 32.5 PG (ref 26.5–33)
MCHC RBC AUTO-ENTMCNC: 33.5 G/DL (ref 31.5–36.5)
MCV RBC AUTO: 97 FL (ref 78–100)
NONHDLC SERPL-MCNC: 101 MG/DL
PLATELET # BLD AUTO: 214 10E9/L (ref 150–450)
POTASSIUM SERPL-SCNC: 4.1 MMOL/L (ref 3.4–5.3)
PROT SERPL-MCNC: 7.1 G/DL (ref 6.8–8.8)
RBC # BLD AUTO: 4.43 10E12/L (ref 4.4–5.9)
SODIUM SERPL-SCNC: 136 MMOL/L (ref 133–144)
TRIGL SERPL-MCNC: 187 MG/DL
WBC # BLD AUTO: 7.1 10E9/L (ref 4–11)

## 2020-08-20 PROCEDURE — 80053 COMPREHEN METABOLIC PANEL: CPT | Performed by: INTERNAL MEDICINE

## 2020-08-20 PROCEDURE — 83036 HEMOGLOBIN GLYCOSYLATED A1C: CPT | Performed by: INTERNAL MEDICINE

## 2020-08-20 PROCEDURE — 87389 HIV-1 AG W/HIV-1&-2 AB AG IA: CPT | Performed by: INTERNAL MEDICINE

## 2020-08-20 PROCEDURE — 85027 COMPLETE CBC AUTOMATED: CPT | Performed by: INTERNAL MEDICINE

## 2020-08-20 PROCEDURE — 80061 LIPID PANEL: CPT | Performed by: INTERNAL MEDICINE

## 2020-08-20 PROCEDURE — 36415 COLL VENOUS BLD VENIPUNCTURE: CPT | Performed by: INTERNAL MEDICINE

## 2020-08-20 NOTE — LETTER
August 24, 2020      Waylon Santiago  [address] [facility] [address]        Dear ,    We are writing to inform you of your test results.    Your Cholesterol remaining high. Given your age and no cardiac risk factors , recommend dietery management of avoiding high fat foods and red meat . Life style measures on weight reduction recommended.    Resulted Orders   **CBC with platelets FUTURE anytime   Result Value Ref Range    WBC 7.1 4.0 - 11.0 10e9/L    RBC Count 4.43 4.4 - 5.9 10e12/L    Hemoglobin 14.4 13.3 - 17.7 g/dL    Hematocrit 43.0 40.0 - 53.0 %    MCV 97 78 - 100 fl    MCH 32.5 26.5 - 33.0 pg    MCHC 33.5 31.5 - 36.5 g/dL    RDW 12.7 10.0 - 15.0 %    Platelet Count 214 150 - 450 10e9/L   **Comprehensive metabolic panel FUTURE anytime   Result Value Ref Range    Sodium 136 133 - 144 mmol/L    Potassium 4.1 3.4 - 5.3 mmol/L    Chloride 103 94 - 109 mmol/L    Carbon Dioxide 26 20 - 32 mmol/L    Anion Gap 7 3 - 14 mmol/L    Glucose 100 (H) 70 - 99 mg/dL      Comment:      Fasting specimen    Urea Nitrogen 17 7 - 30 mg/dL    Creatinine 0.97 0.66 - 1.25 mg/dL    GFR Estimate 90 >60 mL/min/[1.73_m2]      Comment:      Non  GFR Calc  Starting 12/18/2018, serum creatinine based estimated GFR (eGFR) will be   calculated using the Chronic Kidney Disease Epidemiology Collaboration   (CKD-EPI) equation.      GFR Estimate If Black >90 >60 mL/min/[1.73_m2]      Comment:       GFR Calc  Starting 12/18/2018, serum creatinine based estimated GFR (eGFR) will be   calculated using the Chronic Kidney Disease Epidemiology Collaboration   (CKD-EPI) equation.      Calcium 8.7 8.5 - 10.1 mg/dL    Bilirubin Total 0.5 0.2 - 1.3 mg/dL    Albumin 3.8 3.4 - 5.0 g/dL    Protein Total 7.1 6.8 - 8.8 g/dL    Alkaline Phosphatase 92 40 - 150 U/L    ALT 68 0 - 70 U/L    AST 26 0 - 45 U/L   Lipid panel reflex to direct LDL Fasting   Result Value Ref Range    Cholesterol 121 <200 mg/dL     Triglycerides 187 (H) <150 mg/dL      Comment:      Borderline high:  150-199 mg/dl  High:             200-499 mg/dl  Very high:       >499 mg/dl  Fasting specimen      HDL Cholesterol 20 (L) >39 mg/dL    LDL Cholesterol Calculated 64 <100 mg/dL      Comment:      Desirable:       <100 mg/dl    Non HDL Cholesterol 101 <130 mg/dL   HIV Antigen Antibody Combo   Result Value Ref Range    HIV Antigen Antibody Combo Nonreactive NR^Nonreactive          Comment:      HIV-1 p24 Ag & HIV-1/HIV-2 Ab Not Detected   **A1C FUTURE anytime   Result Value Ref Range    Hemoglobin A1C 5.0 0 - 5.6 %      Comment:      Normal <5.7% Prediabetes 5.7-6.4%  Diabetes 6.5% or higher - adopted from ADA   consensus guidelines.       If you have any questions or concerns, please call the clinic at the number listed above.     Please let me know if you have any questions.     Lea Arreola MD on 8/21/2020 at 5:30 PM

## 2020-08-21 LAB — HIV 1+2 AB+HIV1 P24 AG SERPL QL IA: NONREACTIVE

## 2020-08-21 NOTE — RESULT ENCOUNTER NOTE
Your Cholesterol remaining high. Given your age and no cardiac risk factors , recommend dietery management of avoiding high fat foods and red meat . Life style measures on weight reduction recommended. Please let me know if you have any questions.  Lea Arreola MD on 8/21/2020 at 5:30 PM

## 2020-08-25 ENCOUNTER — HOSPITAL ENCOUNTER (OUTPATIENT)
Dept: ULTRASOUND IMAGING | Facility: CLINIC | Age: 50
Discharge: HOME | End: 2020-08-25
Attending: INTERNAL MEDICINE | Admitting: INTERNAL MEDICINE
Payer: COMMERCIAL

## 2020-08-25 DIAGNOSIS — D17.30 LIPOMA OF SKIN AND SUBCUTANEOUS TISSUE: ICD-10-CM

## 2020-08-25 PROCEDURE — 76536 US EXAM OF HEAD AND NECK: CPT

## 2020-08-27 ENCOUNTER — PATIENT OUTREACH (OUTPATIENT)
Dept: SURGERY | Facility: CLINIC | Age: 50
End: 2020-08-27

## 2020-08-27 DIAGNOSIS — D17.30 LIPOMA OF SKIN AND SUBCUTANEOUS TISSUE: Primary | ICD-10-CM

## 2020-08-27 NOTE — PROGRESS NOTES
A referral was placed for this patient to consult with the General Surgery Team regarding a lipoma.  Attempted to reach patient with no answer. LM on VM to call scheduling line.      Patient will need to be scheduled for an in-person visit.

## 2020-08-31 ENCOUNTER — PATIENT OUTREACH (OUTPATIENT)
Dept: SURGERY | Facility: CLINIC | Age: 50
End: 2020-08-31

## 2020-08-31 NOTE — PROGRESS NOTES
A referral was placed for this patient to consult with the General Surgery Team regarding a shoulder lipoma (US in Epic).  Attempted to reach patient with no answer x 2. LM on VM to call scheduling line.      Patient will need an in-person consultation.

## 2020-09-01 DIAGNOSIS — M1A.0790 IDIOPATHIC CHRONIC GOUT OF FOOT WITHOUT TOPHUS, UNSPECIFIED LATERALITY: ICD-10-CM

## 2020-09-02 RX ORDER — ALLOPURINOL 300 MG/1
TABLET ORAL
Qty: 90 TABLET | Refills: 3 | Status: SHIPPED | OUTPATIENT
Start: 2020-09-02 | End: 2021-08-24

## 2020-09-10 ENCOUNTER — TELEPHONE (OUTPATIENT)
Dept: FAMILY MEDICINE | Facility: CLINIC | Age: 50
End: 2020-09-10

## 2020-09-10 NOTE — TELEPHONE ENCOUNTER
left shoulder lipoma. IN clinic visit (x2). Ok to schedule w/Stuart Zaman Wise.    Note: Pt has qs for financial counselor     -AC

## 2020-09-30 DIAGNOSIS — I10 ESSENTIAL HYPERTENSION: ICD-10-CM

## 2020-09-30 RX ORDER — OLMESARTAN MEDOXOMIL, AMLODIPINE AND HYDROCHLOROTHIAZIDE TABLET 40/5/25 MG 40; 5; 25 MG/1; MG/1; MG/1
1 TABLET ORAL DAILY
Qty: 90 TABLET | Refills: 1 | OUTPATIENT
Start: 2020-09-30

## 2020-11-16 ENCOUNTER — HEALTH MAINTENANCE LETTER (OUTPATIENT)
Age: 50
End: 2020-11-16

## 2020-12-03 ENCOUNTER — ALLIED HEALTH/NURSE VISIT (OUTPATIENT)
Dept: NURSING | Facility: CLINIC | Age: 50
End: 2020-12-03
Payer: COMMERCIAL

## 2020-12-03 DIAGNOSIS — Z23 NEED FOR VACCINATION: Primary | ICD-10-CM

## 2020-12-03 PROCEDURE — 90750 HZV VACC RECOMBINANT IM: CPT

## 2020-12-03 PROCEDURE — 90471 IMMUNIZATION ADMIN: CPT

## 2021-03-24 ENCOUNTER — MYC MEDICAL ADVICE (OUTPATIENT)
Dept: INTERNAL MEDICINE | Facility: CLINIC | Age: 51
End: 2021-03-24

## 2021-04-21 DIAGNOSIS — I10 ESSENTIAL HYPERTENSION: ICD-10-CM

## 2021-04-22 RX ORDER — OLMESARTAN MEDOXOMIL, AMLODIPINE AND HYDROCHLOROTHIAZIDE TABLET 40/5/25 MG 40; 5; 25 MG/1; MG/1; MG/1
1 TABLET ORAL DAILY
Qty: 90 TABLET | Refills: 0 | Status: SHIPPED | OUTPATIENT
Start: 2021-04-22 | End: 2021-07-23

## 2021-06-02 DIAGNOSIS — R73.01 IMPAIRED FASTING GLUCOSE: ICD-10-CM

## 2021-06-02 NOTE — LETTER
[facility]  [address] [address]  [phone]            Waylon Santiago  [address] [facility] [address]        Bryanna 3, 2021    Dear Waylon,    While refilling your prescription today, we noticed that you are due to have labs drawn and see your provider.  We will refill your prescription for 90 days, but a follow-up appointment must be made before any additional refills can be approved.     Taking care of your health is important to us and we look forward to seeing you in the near future.  Please call us at [phone] or [phone] (or use [employer]) to schedule an appointment.     Please disregard this notice if you have already made an appointment.    Sincerely,        [facility]

## 2021-06-03 NOTE — TELEPHONE ENCOUNTER
Routing refill request to provider for review/approval because:  Labs not current:  A1C     Consuelo MCDANIELS RN  EP Triage

## 2021-07-21 DIAGNOSIS — I10 ESSENTIAL HYPERTENSION: ICD-10-CM

## 2021-07-21 NOTE — LETTER
[facility]  [address] [address]  [phone]      7/23/2021       Waylon Santiago  [address] [facility] [address]        Dear Waylon,  Your are overdue for an Annual Visit with Dr. Maynor Johnson. Your Olmesartan-amLODIPine-HCTZ 40-5-25 MG TABS has been refilled for 30 days with 1 refill, but will not be approved for renewal until you have seen Dr. Johnson for your Annual Visit.    Please contact the number listed above or schedule via [employer].   If you have already scheduled an appointment, please disregard the request.     Sincerely,      Maynor Johnson MD/Miriam Ryan, [facility]  Internal Medicine

## 2021-07-23 RX ORDER — OLMESARTAN MEDOXOMIL, AMLODIPINE AND HYDROCHLOROTHIAZIDE TABLET 40/5/25 MG 40; 5; 25 MG/1; MG/1; MG/1
1 TABLET ORAL DAILY
Qty: 30 TABLET | Refills: 1 | Status: SHIPPED | OUTPATIENT
Start: 2021-07-23 | End: 2021-10-01

## 2021-09-18 ENCOUNTER — HEALTH MAINTENANCE LETTER (OUTPATIENT)
Age: 51
End: 2021-09-18

## 2021-09-23 ENCOUNTER — DOCUMENTATION ONLY (OUTPATIENT)
Dept: LAB | Facility: CLINIC | Age: 51
End: 2021-09-23

## 2021-09-23 ENCOUNTER — MYC REFILL (OUTPATIENT)
Dept: INTERNAL MEDICINE | Facility: CLINIC | Age: 51
End: 2021-09-23

## 2021-09-23 DIAGNOSIS — R73.01 IMPAIRED FASTING GLUCOSE: ICD-10-CM

## 2021-09-23 DIAGNOSIS — M1A.0790 IDIOPATHIC CHRONIC GOUT OF FOOT WITHOUT TOPHUS, UNSPECIFIED LATERALITY: ICD-10-CM

## 2021-09-23 DIAGNOSIS — I10 ESSENTIAL HYPERTENSION: ICD-10-CM

## 2021-09-23 RX ORDER — ALLOPURINOL 300 MG/1
1 TABLET ORAL DAILY
Qty: 90 TABLET | Refills: 0 | OUTPATIENT
Start: 2021-09-23

## 2021-09-23 RX ORDER — OLMESARTAN MEDOXOMIL, AMLODIPINE AND HYDROCHLOROTHIAZIDE TABLET 40/5/25 MG 40; 5; 25 MG/1; MG/1; MG/1
TABLET ORAL
Qty: 30 TABLET | Refills: 1 | OUTPATIENT
Start: 2021-09-23

## 2021-09-23 NOTE — PROGRESS NOTES
PATIENT COMING IN FOR LABS 09/24/2021. PLEASE PLACE ORDERS, APPT NOTES INDICATE LABS FOR MEDICATION CHECK. THANK YOU.

## 2021-09-23 NOTE — TELEPHONE ENCOUNTER
Routing refill request to provider for review/approval because:  Labs not current:    Hemoglobin A1C   Date Value Ref Range Status   08/20/2020 5.0 0 - 5.6 % Final     Comment:     Normal <5.7% Prediabetes 5.7-6.4%  Diabetes 6.5% or higher - adopted from ADA   consensus guidelines.       Creatinine   Date Value Ref Range Status   08/20/2020 0.97 0.66 - 1.25 mg/dL Final     Lab Results   Component Value Date    ALT 68 08/20/2020   Patient needs to be seen because it has been more than 1 year since last office visit and establish PCP   Santa Oseguera RN

## 2021-09-23 NOTE — LETTER
[facility]  [address] [address]  [phone]      9/27/2021       Waylon Santiago  [address] [facility] [address]        Dear Waylon,  Your prescription has been reviewed. However, due to the fact that your Provider, Briana Patino, is no longer at our establishment; You will need to establish care with another Provider.     Please call the number listed above or log into "[employer] to scheduled an appointment to establish care with another Provider. Your prescription for metFORMIN (GLUCOPHAGE) 500 MG tablet and allopurinol (ZYLOPRIM) 300 MG tablet will not be refilled until you have established with a new Provider.     Sincerely,    Your Healthcare Team  Internal Medicine

## 2021-09-23 NOTE — TELEPHONE ENCOUNTER
Routing refill request to provider for review/approval because:  Labs not current:    Creatinine   Date Value Ref Range Status   08/20/2020 0.97 0.66 - 1.25 mg/dL Final     Potassium   Date Value Ref Range Status   08/20/2020 4.1 3.4 - 5.3 mmol/L Final     BP Readings from Last 3 Encounters:   08/18/20 120/76   06/26/19 100/60   12/17/18 110/70   Patient needs to be seen because it has been more than 1 year since last office visit. Patient needs to establish a PCP  Santa Oseguera RN

## 2021-09-24 NOTE — PROGRESS NOTES
Left detailed message that patient should not keep lab appointment today and should reschedule after his appointment with Dr. Arreola on 10/1. Lab only appointment cancelled. Roxana Kyle RN

## 2021-09-28 ENCOUNTER — DOCUMENTATION ONLY (OUTPATIENT)
Dept: LAB | Facility: CLINIC | Age: 51
End: 2021-09-28
Payer: COMMERCIAL

## 2021-09-28 NOTE — PROGRESS NOTES
Please place or confirm orders for upcoming lab appointment on 10/01/2021. Thank you.    If no labs are needed at this time please advise patient and cancel lab only appt. Thank you.

## 2021-09-29 NOTE — PROGRESS NOTES
It looks like he has an appointment with Dr. Arreola earlier in the morning (9am) on 10/1, so likely doesn't need the lab appointment as well (11:30am).  Can you confirm with patient?  Thank you!    Emily Kee MD

## 2021-10-01 ENCOUNTER — LAB (OUTPATIENT)
Dept: LAB | Facility: CLINIC | Age: 51
End: 2021-10-01
Payer: COMMERCIAL

## 2021-10-01 ENCOUNTER — VIRTUAL VISIT (OUTPATIENT)
Dept: FAMILY MEDICINE | Facility: CLINIC | Age: 51
End: 2021-10-01
Payer: COMMERCIAL

## 2021-10-01 DIAGNOSIS — M1A.0790 IDIOPATHIC CHRONIC GOUT OF FOOT WITHOUT TOPHUS, UNSPECIFIED LATERALITY: ICD-10-CM

## 2021-10-01 DIAGNOSIS — E66.01 MORBID OBESITY (H): ICD-10-CM

## 2021-10-01 DIAGNOSIS — E78.5 HYPERLIPIDEMIA LDL GOAL <100: ICD-10-CM

## 2021-10-01 DIAGNOSIS — Z23 NEED FOR PROPHYLACTIC VACCINATION AND INOCULATION AGAINST INFLUENZA: ICD-10-CM

## 2021-10-01 DIAGNOSIS — R73.03 PREDIABETES: ICD-10-CM

## 2021-10-01 DIAGNOSIS — Z11.59 NEED FOR HEPATITIS C SCREENING TEST: ICD-10-CM

## 2021-10-01 DIAGNOSIS — Z13.21 ENCOUNTER FOR VITAMIN DEFICIENCY SCREENING: ICD-10-CM

## 2021-10-01 DIAGNOSIS — I10 ESSENTIAL HYPERTENSION: ICD-10-CM

## 2021-10-01 DIAGNOSIS — I10 ESSENTIAL HYPERTENSION: Primary | ICD-10-CM

## 2021-10-01 DIAGNOSIS — Z12.11 SCREEN FOR COLON CANCER: ICD-10-CM

## 2021-10-01 LAB
ALBUMIN SERPL-MCNC: 3.9 G/DL (ref 3.4–5)
ALP SERPL-CCNC: 94 U/L (ref 40–150)
ALT SERPL W P-5'-P-CCNC: 141 U/L (ref 0–70)
ANION GAP SERPL CALCULATED.3IONS-SCNC: 5 MMOL/L (ref 3–14)
AST SERPL W P-5'-P-CCNC: 55 U/L (ref 0–45)
BILIRUB SERPL-MCNC: 0.6 MG/DL (ref 0.2–1.3)
BUN SERPL-MCNC: 24 MG/DL (ref 7–30)
CALCIUM SERPL-MCNC: 9.2 MG/DL (ref 8.5–10.1)
CHLORIDE BLD-SCNC: 105 MMOL/L (ref 94–109)
CHOLEST SERPL-MCNC: 153 MG/DL
CO2 SERPL-SCNC: 27 MMOL/L (ref 20–32)
CREAT SERPL-MCNC: 1.09 MG/DL (ref 0.66–1.25)
DEPRECATED CALCIDIOL+CALCIFEROL SERPL-MC: 59 UG/L (ref 20–75)
ERYTHROCYTE [DISTWIDTH] IN BLOOD BY AUTOMATED COUNT: 12.6 % (ref 10–15)
FASTING STATUS PATIENT QL REPORTED: YES
GFR SERPL CREATININE-BSD FRML MDRD: 78 ML/MIN/1.73M2
GLUCOSE BLD-MCNC: 100 MG/DL (ref 70–99)
HBA1C MFR BLD: 5.3 % (ref 0–5.6)
HCT VFR BLD AUTO: 46 % (ref 40–53)
HCV AB SERPL QL IA: NONREACTIVE
HDLC SERPL-MCNC: 18 MG/DL
HGB BLD-MCNC: 15.2 G/DL (ref 13.3–17.7)
LDLC SERPL CALC-MCNC: 91 MG/DL
MCH RBC QN AUTO: 32.6 PG (ref 26.5–33)
MCHC RBC AUTO-ENTMCNC: 33 G/DL (ref 31.5–36.5)
MCV RBC AUTO: 99 FL (ref 78–100)
NONHDLC SERPL-MCNC: 135 MG/DL
PLATELET # BLD AUTO: 244 10E3/UL (ref 150–450)
POTASSIUM BLD-SCNC: 3.9 MMOL/L (ref 3.4–5.3)
PROT SERPL-MCNC: 7.3 G/DL (ref 6.8–8.8)
RBC # BLD AUTO: 4.66 10E6/UL (ref 4.4–5.9)
SODIUM SERPL-SCNC: 137 MMOL/L (ref 133–144)
TRIGL SERPL-MCNC: 220 MG/DL
WBC # BLD AUTO: 7.8 10E3/UL (ref 4–11)

## 2021-10-01 PROCEDURE — 80061 LIPID PANEL: CPT

## 2021-10-01 PROCEDURE — 99214 OFFICE O/P EST MOD 30 MIN: CPT | Mod: 95 | Performed by: INTERNAL MEDICINE

## 2021-10-01 PROCEDURE — 36415 COLL VENOUS BLD VENIPUNCTURE: CPT

## 2021-10-01 PROCEDURE — 86803 HEPATITIS C AB TEST: CPT

## 2021-10-01 PROCEDURE — 82306 VITAMIN D 25 HYDROXY: CPT

## 2021-10-01 PROCEDURE — 83036 HEMOGLOBIN GLYCOSYLATED A1C: CPT

## 2021-10-01 PROCEDURE — 80053 COMPREHEN METABOLIC PANEL: CPT

## 2021-10-01 PROCEDURE — 85027 COMPLETE CBC AUTOMATED: CPT

## 2021-10-01 RX ORDER — AMLODIPINE AND OLMESARTAN MEDOXOMIL 10; 40 MG/1; MG/1
TABLET ORAL
Qty: 90 TABLET | Refills: 1 | Status: SHIPPED | OUTPATIENT
Start: 2021-10-01 | End: 2021-10-01

## 2021-10-01 RX ORDER — INFLUENZA A VIRUS A/VICTORIA/2454/2019 IVR-207 (H1N1) ANTIGEN (PROPIOLACTONE INACTIVATED), INFLUENZA A VIRUS A/HONG KONG/2671/2019 IVR-208 (H3N2) ANTIGEN (PROPIOLACTONE INACTIVATED), INFLUENZA B VIRUS B/VICTORIA/705/2018 BVR-11 ANTIGEN (PROPIOLACTONE INACTIVATED), INFLUENZA B VIRUS B/PHUKET/3073/2013 BVR-1B ANTIGEN (PROPIOLACTONE INACTIVATED) 15; 15; 15; 15 UG/.5ML; UG/.5ML; UG/.5ML; UG/.5ML
INJECTION, SUSPENSION INTRAMUSCULAR
COMMUNITY
Start: 2020-11-14 | End: 2022-08-31

## 2021-10-01 NOTE — PATIENT INSTRUCTIONS
"As discussed, given your Gout issues we shouldn't give hydrochlorothiazide which you have been taking for your BP. I have changed to Olmesartan and Amlodipine only . Please check your BP for next 7 days on this new change.     Please make RN visit along with your lab for Flu shot and also BP check today.     Will await for the fasting lab work results and discuss further on discontinuing Metformin as you never had diabetes and A1C has always been normal and you have been continued since 5 yrs by previous providers.     =========================      Dietary Approaches to Stop Hypertension trial -- A different approach was evaluated in the Dietary Approaches to Stop Hypertension (DASH) trial [6]. Rather than evaluating sodium intake or weight loss, DASH randomly assigned 459 patients with BPs of less than 160/80 to 95 mmHg to one of three diets:  ?A control diet low in fruits, vegetables, and legumes and high in snacks, sweets, meats, and saturated fat.  ?A diet rich in fruits, vegetables, legumes and low in snacks and sweets.  ?A combination diet rich in fruits, vegetables, legumes, and low-fat dairy products and low in snacks, sweets, meats, and saturated and total fat (this combination diet is called the \"DASH diet\"). The DASH diet is comprised of four to five servings of fruit, four to five servings of vegetables, two to three servings of low-fat dairy per day, and <25 percent fat.  The following observations were noted in which the BP reductions were expressed in relation to the fall in BP seen with the control diet:  ?The fruits and vegetables diet reduced the BP by 2.8/1.1 mmHg, and the combination diet reduced the BP by 5.5/3.0.  ?These effects were more pronounced in patients with hypertension. With the combination diet, for example, the BP fell 11.4/5.5 mmHg in hypertensives versus 3.5/2.1 mmHg in the normotensives.  ?The antihypertensive effects were maximal by the end of week 2 with any of the diets and " were then maintained for eight weeks.    ====================    Patient Education     Eating to Prevent Gout  Gout is a painful form of arthritis caused by an excess of uric acid. This is a waste product made by the body. It builds up in the body and forms crystals that collect in the joints, causing a gout attack. Alcohol and certain foods can trigger a gout attack. Below are some guidelines for changing your diet to help you manage gout. Your healthcare provider can work with you to determine the best eating plan for you. You can learn which foods affect your gout more than others. Reactions to different foods can vary from person to person. Know that diet is only one part of managing gout. Take your medicines as prescribed and follow the other guidelines your healthcare provider has given you.   Foods to limit  Eating too many foods containing purines may increase the levels of uric acid in your body and increase your risk for a gout attack. It may be best to limit these high-purine foods:     Alcohol (beer, hard liquor and red wine). You may be told to give up alcohol completely.    Certain fish (anchovies, sardines, fish roes, herring, tuna, mussels, codfish, scallops, trout, and adore)    Certain meats (red meat, processed meat, ricks, turkey, wild game, and goose)    Sauces and gravies made with meat    Organ meats (such as liver, kidneys, sweetbreads, and tripe)    Legumes (such as dried beans and peas)    Mushrooms, spinach, asparagus, and cauliflower    Yeast and yeast extract supplements  Foods to try  Some foods may be helpful for people with gout. You may want to try adding some of the following foods to your diet:     Dark berries. These include blueberries, blackberries, and cherries. These berries contain chemicals that may lower uric acid.    Tofu. Tofu, which is made from soy, is a good source of protein. Studies have shown that it may be a better choice than meat for people with gout.    Omega  fatty acids. These acids are found in fatty fish (such as salmon), certain oils (such as flax, olive, or nut oils), or nuts. They may help prevent inflammation due to gout.  Gout guidelines  The following guidelines are recommended by the Academy of Nutrition and Dietetics for people with gout. Your diet should be:     High in fiber, whole grains, fruits, and vegetables    Low in protein. About 15% of calories should come from protein. Choose lean sources, such as soy, lean meats, and poultry without the skin.    Low in fat. No more than 30% of calories should come from fat, with only 10% coming from animal (saturated) fat.  [employer] last reviewed this educational content on 8/1/2020 2000-2021 [employer]. All rights reserved. This information is not intended as a substitute for professional medical care. Always follow your healthcare professional's instructions.

## 2021-10-01 NOTE — TELEPHONE ENCOUNTER
Pts labs were not drawn and almost appt time. Labs were order at 9:42am this morning closing this case for now.   Sheyla Byrnes

## 2021-10-01 NOTE — PROGRESS NOTES
Waylon is a 50 year old who is being evaluated via a billable video visit.      How would you like to obtain your AVS? MyChart  If the video visit is dropped, the invitation should be resent by: Text to cell phone: [phone]  Will anyone else be joining your video visit? No      Video Start Time: Start: 10/01/2021 09:17 am      Assessment and Plan  1. Essential hypertension  Stable, but given the risk factors of gout on allopurinol will avoid hydrochlorothiazide. Sent in modified medication with just Olmesartan and Amlodipine , removed hydrochlorothiazide component of the medication . Given instructions to send me the BP log which he understood and agreed with the plan.   - Blood Pressure Monitoring (BLOOD PRESSURE KIT) PADMA; 1 each 2 times daily With arm cuff  Dispense: 1 each; Refill: 0  - Comprehensive metabolic panel (BMP + Alb, Alk Phos, ALT, AST, Total. Bili, TP); Future  - amLODIPine-Olmesartan 10-40 MG TABS; Take 1 tablet daily  Dispense: 90 tablet; Refill: 1    2. Hyperlipidemia LDL goal <100  - Lipid panel reflex to direct LDL Fasting; Future    4. Prediabetes  7. Morbid obesity (H)  Please see AVS for details.   - Hemoglobin A1c; Future    5. Screen for colon cancer  - Adult Gastro Ref - Procedure Only; Future    6. Need for hepatitis C screening test  - Hepatitis C Screen Reflex to HCV RNA Quant and Genotype; Future    8. Need for prophylactic vaccination and inoculation against influenza  Pt will schedule RN visit for Flu vaccine    9. Encounter for vitamin deficiency screening  - Vitamin D deficiency screening; Future    10. Idiopathic chronic gout of foot without tophus, unspecified laterality  - CBC with platelets; Future       Patient Instructions   As discussed, given your Gout issues we shouldn't give hydrochlorothiazide which you have been taking for your BP. I have changed to Olmesartan and Amlodipine only . Please check your BP for next 7 days on this new change.     Please make RN visit along  "with your lab for Flu shot and also BP check today.     Will await for the fasting lab work results and discuss further on discontinuing Metformin as you never had diabetes and A1C has always been normal and you have been continued since 5 yrs by previous providers.     =========================      Dietary Approaches to Stop Hypertension trial -- A different approach was evaluated in the Dietary Approaches to Stop Hypertension (DASH) trial [6]. Rather than evaluating sodium intake or weight loss, DASH randomly assigned 459 patients with BPs of less than 160/80 to 95 mmHg to one of three diets:  ?A control diet low in fruits, vegetables, and legumes and high in snacks, sweets, meats, and saturated fat.  ?A diet rich in fruits, vegetables, legumes and low in snacks and sweets.  ?A combination diet rich in fruits, vegetables, legumes, and low-fat dairy products and low in snacks, sweets, meats, and saturated and total fat (this combination diet is called the \"DASH diet\"). The DASH diet is comprised of four to five servings of fruit, four to five servings of vegetables, two to three servings of low-fat dairy per day, and <25 percent fat.  The following observations were noted in which the BP reductions were expressed in relation to the fall in BP seen with the control diet:  ?The fruits and vegetables diet reduced the BP by 2.8/1.1 mmHg, and the combination diet reduced the BP by 5.5/3.0.  ?These effects were more pronounced in patients with hypertension. With the combination diet, for example, the BP fell 11.4/5.5 mmHg in hypertensives versus 3.5/2.1 mmHg in the normotensives.  ?The antihypertensive effects were maximal by the end of week 2 with any of the diets and were then maintained for eight weeks.    ====================    Patient Education     Eating to Prevent Gout  Gout is a painful form of arthritis caused by an excess of uric acid. This is a waste product made by the body. It builds up in the body and " forms crystals that collect in the joints, causing a gout attack. Alcohol and certain foods can trigger a gout attack. Below are some guidelines for changing your diet to help you manage gout. Your healthcare provider can work with you to determine the best eating plan for you. You can learn which foods affect your gout more than others. Reactions to different foods can vary from person to person. Know that diet is only one part of managing gout. Take your medicines as prescribed and follow the other guidelines your healthcare provider has given you.   Foods to limit  Eating too many foods containing purines may increase the levels of uric acid in your body and increase your risk for a gout attack. It may be best to limit these high-purine foods:     Alcohol (beer, hard liquor and red wine). You may be told to give up alcohol completely.    Certain fish (anchovies, sardines, fish roes, herring, tuna, mussels, codfish, scallops, trout, and adore)    Certain meats (red meat, processed meat, ricks, turkey, wild game, and goose)    Sauces and gravies made with meat    Organ meats (such as liver, kidneys, sweetbreads, and tripe)    Legumes (such as dried beans and peas)    Mushrooms, spinach, asparagus, and cauliflower    Yeast and yeast extract supplements  Foods to try  Some foods may be helpful for people with gout. You may want to try adding some of the following foods to your diet:     Dark berries. These include blueberries, blackberries, and cherries. These berries contain chemicals that may lower uric acid.    Tofu. Tofu, which is made from soy, is a good source of protein. Studies have shown that it may be a better choice than meat for people with gout.    Omega fatty acids. These acids are found in fatty fish (such as salmon), certain oils (such as flax, olive, or nut oils), or nuts. They may help prevent inflammation due to gout.  Gout guidelines  The following guidelines are recommended by the Academy of  Nutrition and Dietetics for people with gout. Your diet should be:     High in fiber, whole grains, fruits, and vegetables    Low in protein. About 15% of calories should come from protein. Choose lean sources, such as soy, lean meats, and poultry without the skin.    Low in fat. No more than 30% of calories should come from fat, with only 10% coming from animal (saturated) fat.  [employer] last reviewed this educational content on 8/1/2020 2000-2021 [employer]. All rights reserved. This information is not intended as a substitute for professional medical care. Always follow your healthcare professional's instructions.             Return in about 3 months (around 1/1/2022), or if symptoms worsen or fail to improve, for Preventative Visit.    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 He follows [facility] . Did have lipoma at the nape of neck for which he was given referral to general surgery after USG confirmed 8 cm lipoma.     Pt was following  as PCP who has retired now. He is here for medication check and lab work. Last labs in 8/2020 with normal CMP, CBC but low HDL on Lipid panel at that time. Does have risk factors of HTN on medications and Morbid obesity.       Allergies   Allergen Reactions     Niaspan [Niacin]         Past Medical History:   Diagnosis Date     Hyperlipidemia      Hypertension        History reviewed. No pertinent surgical history.    Family History   Problem Relation Age of Onset     Diabetes Mother      Family History Negative Father         health hx unknown; smoker       Social History     Tobacco Use     Smoking status: Never Smoker     Smokeless tobacco: Never Used   Substance Use  Topics     Alcohol use: Yes     Comment: occ.        Current Outpatient Medications   Medication     allopurinol (ZYLOPRIM) 300 MG tablet     amLODIPine-Olmesartan 10-40 MG TABS     aspirin 81 MG tablet     Blood Pressure Monitoring (BLOOD PRESSURE KIT) PADMA     metFORMIN (GLUCOPHAGE) 500 MG tablet     AFLURIA QUADRIVALENT 0.5 ML injection     STATIN NOT PRESCRIBED, INTENTIONAL,     No current facility-administered medications for this visit.        Review of Systems   Constitutional, HEENT, cardiovascular, pulmonary, GI, , musculoskeletal, neuro, skin, endocrine and psych systems are negative, except as otherwise noted.      Objective           Vitals:  No vitals were obtained today due to virtual visit.    Physical Exam   GENERAL: Healthy, alert and no distress  EYES: Eyes grossly normal to inspection.  No discharge or erythema, or obvious scleral/conjunctival abnormalities.  RESP: No audible wheeze, cough, or visible cyanosis.  No visible retractions or increased work of breathing.    SKIN: Visible skin clear. No significant rash, abnormal pigmentation or lesions.  NEURO: Cranial nerves grossly intact.  Mentation and speech appropriate for age.  PSYCH: Mentation appears normal, affect normal/bright, judgement and insight intact, normal speech and appearance well-groomed.    Labs and imaging reviewed and discussed with the patient.      Video-Visit Details    Type of service:  Video Visit    Video End Time:Stop: 10/01/2021 09:44 am    Originating Location (pt. Location): Home    Distant Location (provider location):  [facility]     Platform used for Video Visit: mBlox

## 2021-10-02 DIAGNOSIS — E78.1 HYPERTRIGLYCERIDEMIA: Primary | ICD-10-CM

## 2021-10-02 RX ORDER — METAPROTERENOL SULFATE 10 MG
500 TABLET ORAL 2 TIMES DAILY
Qty: 180 CAPSULE | Refills: 1 | Status: SHIPPED | OUTPATIENT
Start: 2021-10-02 | End: 2022-08-31

## 2021-10-04 ENCOUNTER — TELEPHONE (OUTPATIENT)
Dept: FAMILY MEDICINE | Facility: CLINIC | Age: 51
End: 2021-10-04

## 2021-10-04 NOTE — TELEPHONE ENCOUNTER
----- Message from Lea Arreola MD sent at 10/2/2021  6:05 PM CDT -----  Your Cholesterol levels triglycerides are worsened compared to the past. I have sent in fish oil capsules to your pharmacy for improvement.     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 Please quit alcohol completely if you are consuming any for improvement. Will need follow up on this.     All your other labs normal, you may see some highlighted which do not have Clinical significance.  Lea Arreola MD on 10/2/2021 at 6:05 PM

## 2021-10-04 NOTE — TELEPHONE ENCOUNTER
Patient Contact    Attempt # 1    Was call answered?  No.  Left message on voicemail with information to call triage back. Advised results were sent by [employer] for him to review.    On call back:      - Relay results below

## 2021-10-05 NOTE — TELEPHONE ENCOUNTER
2nd attempt.  Non detailed message left for pt to return call to clinic and ask to speak with a triage nurse.  Advised results and message from Dr. Arreola are available in my chart.    Consuelo MCDANIELS RN  EP Triage

## 2021-10-06 ENCOUNTER — ALLIED HEALTH/NURSE VISIT (OUTPATIENT)
Dept: INTERNAL MEDICINE | Facility: CLINIC | Age: 51
End: 2021-10-06
Payer: COMMERCIAL

## 2021-10-06 VITALS — DIASTOLIC BLOOD PRESSURE: 74 MMHG | OXYGEN SATURATION: 98 % | HEART RATE: 129 BPM | SYSTOLIC BLOOD PRESSURE: 126 MMHG

## 2021-10-06 DIAGNOSIS — Z23 NEED FOR INFLUENZA VACCINATION: Primary | ICD-10-CM

## 2021-10-06 PROCEDURE — 99207 PR NO CHARGE NURSE ONLY: CPT

## 2021-10-06 PROCEDURE — 90682 RIV4 VACC RECOMBINANT DNA IM: CPT

## 2021-10-06 PROCEDURE — 90471 IMMUNIZATION ADMIN: CPT

## 2021-10-06 NOTE — PROGRESS NOTES
In for blood pressure check.    Today's reading: There were no vitals taken for this visit.     History   Smoking Status     Never Smoker   Smokeless Tobacco     Never Used       Current Outpatient Medications   Medication Sig     AFLURIA QUADRIVALENT 0.5 ML injection ADM 0.5ML IM UTD     allopurinol (ZYLOPRIM) 300 MG tablet TAKE 1 TABLET BY MOUTH EVERY DAY     amLODIPine (NORVASC) 10 MG tablet Take 1 tablet (10 mg) by mouth daily     aspirin 81 MG tablet Take 1 tablet (81 mg) by mouth daily     Blood Pressure Monitoring (BLOOD PRESSURE KIT) PADMA 1 each 2 times daily With arm cuff     metFORMIN (GLUCOPHAGE) 500 MG tablet Take 2 tablets (1,000 mg) by mouth daily (with breakfast) Take 1 tab twice a day     olmesartan (BENICAR) 40 MG tablet Take 1 tablet (40 mg) by mouth daily     Omega-3 Fish Oil 500 MG capsule Take 1 capsule (500 mg) by mouth 2 times daily     STATIN NOT PRESCRIBED, INTENTIONAL, Please choose reason not prescribed, below (Patient not taking: Reported on 8/18/2020)     No current facility-administered medications for this visit.        He is having his blood pressure monitored because it has been requested by new PCP  Dr. Arreola  has been on medication.    Waylon has had the following symptoms: none    Gisell Canseco, JOSE

## 2021-10-06 NOTE — TELEPHONE ENCOUNTER
Pt reviewed lab results and message from Dr. Arreola via my chart.    Consuelo MCDANIELS RN  EP Triage

## 2021-12-02 DIAGNOSIS — M1A.0790 IDIOPATHIC CHRONIC GOUT OF FOOT WITHOUT TOPHUS, UNSPECIFIED LATERALITY: ICD-10-CM

## 2021-12-03 NOTE — TELEPHONE ENCOUNTER
Uric Acid   Date Value Ref Range Status   06/28/2019 5.7 3.5 - 7.2 mg/dL Final     Please refill as appropriate.  Thank you,    Khushbu Antonio RN  [facility]

## 2021-12-04 RX ORDER — ALLOPURINOL 300 MG/1
1 TABLET ORAL DAILY
Qty: 90 TABLET | Refills: 0 | Status: SHIPPED | OUTPATIENT
Start: 2021-12-04 | End: 2022-02-28

## 2022-01-08 ENCOUNTER — HEALTH MAINTENANCE LETTER (OUTPATIENT)
Age: 52
End: 2022-01-08

## 2022-02-04 DIAGNOSIS — R73.01 IMPAIRED FASTING GLUCOSE: ICD-10-CM

## 2022-02-04 NOTE — TELEPHONE ENCOUNTER
Routing refill request to provider for review/approval because:  Fails biguanide agent protocol    Eleni Gardner RN

## 2022-02-28 DIAGNOSIS — M1A.0790 IDIOPATHIC CHRONIC GOUT OF FOOT WITHOUT TOPHUS, UNSPECIFIED LATERALITY: ICD-10-CM

## 2022-02-28 RX ORDER — ALLOPURINOL 300 MG/1
1 TABLET ORAL DAILY
Qty: 90 TABLET | Refills: 0 | Status: SHIPPED | OUTPATIENT
Start: 2022-02-28 | End: 2022-06-03

## 2022-02-28 NOTE — TELEPHONE ENCOUNTER
Routing refill request to provider for review/approval because:  Labs not current:  Uric Acid    Consuelo MCDANIELS RN  EP Triage

## 2022-03-29 DIAGNOSIS — I10 ESSENTIAL HYPERTENSION: ICD-10-CM

## 2022-03-29 RX ORDER — OLMESARTAN MEDOXOMIL 40 MG/1
40 TABLET ORAL DAILY
Qty: 90 TABLET | Refills: 1 | Status: SHIPPED | OUTPATIENT
Start: 2022-03-29 | End: 2022-09-09

## 2022-03-29 RX ORDER — AMLODIPINE BESYLATE 10 MG/1
10 TABLET ORAL DAILY
Qty: 90 TABLET | Refills: 1 | Status: SHIPPED | OUTPATIENT
Start: 2022-03-29 | End: 2022-09-09

## 2022-03-29 NOTE — TELEPHONE ENCOUNTER
Prescription approved per [facility] Refill Protocol.    Consuelo MCDANIELS RN  EP Triage

## 2022-03-29 NOTE — TELEPHONE ENCOUNTER
Prescription approved per [facility] Refill Protocol.    Consuelo MCDANIELS RN  EP Triage

## 2022-04-13 DIAGNOSIS — R73.01 IMPAIRED FASTING GLUCOSE: ICD-10-CM

## 2022-04-15 ENCOUNTER — MYC MEDICAL ADVICE (OUTPATIENT)
Dept: INTERNAL MEDICINE | Facility: CLINIC | Age: 52
End: 2022-04-15
Payer: COMMERCIAL

## 2022-04-15 NOTE — TELEPHONE ENCOUNTER
Routing refill request to provider for review/approval because:  Labs not current:    Hemoglobin A1C POCT   Date Value Ref Range Status   08/20/2020 5.0 0 - 5.6 % Final     Comment:     Normal <5.7% Prediabetes 5.7-6.4%  Diabetes 6.5% or higher - adopted from ADA   consensus guidelines.       Hemoglobin A1C   Date Value Ref Range Status   10/01/2021 5.3 0.0 - 5.6 % Final     Comment:     Normal <5.7%   Prediabetes 5.7-6.4%    Diabetes 6.5% or higher     Note: Adopted from ADA consensus guidelines.       Patient needs to be seen because:  It has been more than 6months since last visit. Myxer message sent to pt.

## 2022-04-20 ENCOUNTER — TELEPHONE (OUTPATIENT)
Dept: FAMILY MEDICINE | Facility: CLINIC | Age: 52
End: 2022-04-20
Payer: COMMERCIAL

## 2022-04-20 DIAGNOSIS — R73.01 IMPAIRED FASTING GLUCOSE: ICD-10-CM

## 2022-04-20 NOTE — TELEPHONE ENCOUNTER
Pharmacy faxed needing clarification for metformin directions. Please correct and resend Rx. Please advise.    Pharmacy pended    Eleni GONZALEZ RN  EP Triage

## 2022-05-21 ENCOUNTER — E-VISIT (OUTPATIENT)
Dept: FAMILY MEDICINE | Facility: CLINIC | Age: 52
End: 2022-05-21
Payer: COMMERCIAL

## 2022-05-21 DIAGNOSIS — B96.89 ACUTE BACTERIAL SINUSITIS: Primary | ICD-10-CM

## 2022-05-21 DIAGNOSIS — J01.90 ACUTE BACTERIAL SINUSITIS: Primary | ICD-10-CM

## 2022-05-21 PROCEDURE — 99421 OL DIG E/M SVC 5-10 MIN: CPT | Performed by: PHYSICIAN ASSISTANT

## 2022-05-21 RX ORDER — FLUTICASONE PROPIONATE 50 MCG
2 SPRAY, SUSPENSION (ML) NASAL DAILY
Qty: 18.2 ML | Refills: 0 | Status: SHIPPED | OUTPATIENT
Start: 2022-05-21 | End: 2022-08-31

## 2022-05-21 NOTE — PATIENT INSTRUCTIONS
Sinusitis (Antibiotic Treatment)    The sinuses are air-filled spaces within the bones of the face. They connect to the inside of the nose. Sinusitis is an inflammation of the tissue that lines the sinuses. Sinusitis can occur during a cold. It can also happen due to allergies to pollens and other particles in the air. Sinusitis can cause symptoms of sinus congestion and a feeling of fullness. A sinus infection causes fever, headache, and facial pain. There is often green or yellow fluid draining from the nose or into the back of the throat (post-nasal drip). You have been given antibiotics to treat this condition.   Home care    Take the full course of antibiotics as instructed. Don't stop taking them, even when you feel better.    Drink plenty of water, hot tea, and other liquids as directed by the healthcare provider. This may help thin nasal mucus. It also may help your sinuses drain fluids.    Heat may help soothe painful areas of your face. Use a towel soaked in hot water. Or,  the shower and direct the warm spray onto your face. Using a vaporizer along with a menthol rub at night may also help soothe symptoms.     An expectorant with guaifenesin may help thin nasal mucus and help your sinuses drain fluids. Talk with your provider or pharmacists before taking an over-the-counter (OTC) medicine if you have any questions about it or its side effects..    You can use an OTC decongestant, unless a similar medicine was prescribed to you. Nasal sprays work the fastest. Use one that contains phenylephrine or oxymetazoline. First blow your nose gently. Then use the spray. Don't use these medicines more often than directed on the label. If you do, your symptoms may get worse. You may also take pills that contain pseudoephedrine. Don t use products that combine multiple medicines. This is because side effects may be increased. Read labels. You can also ask the pharmacist for help. (People with high blood  pressure should not use decongestants. They can raise blood pressure.) Talk with your provider or pharmacist if you have any questions about the medicine..    OTC antihistamines may help if allergies contributed to your sinusitis. Talk with your provider or pharmacist if you have any questions about the medicine..    Don't use nasal rinses or irrigation during an acute sinus infection, unless your healthcare provider tells you to. Rinsing may spread the infection to other areas in your sinuses.    Use acetaminophen or ibuprofen to control pain, unless another pain medicine was prescribed to you. If you have chronic liver or kidney disease or ever had a stomach ulcer, talk with your healthcare provider before using these medicines. Never give aspirin to anyone under age 18 who is ill with a fever. It may cause severe liver damage.    Don't smoke. This can make symptoms worse.    Follow-up care  Follow up with your healthcare provider, or as advised.   When to seek medical advice  Call your healthcare provider if any of these occur:     Facial pain or headache that gets worse    Stiff neck    Unusual drowsiness or confusion    Swelling of your forehead or eyelids    Symptoms don't go away in 10 days    Vision problems, such as blurred or double vision    Fever of 100.4 F (38 C) or higher, or as directed by your healthcare provider  Call 911  Call 911 if any of these occur:     Seizure    Trouble breathing    Feeling dizzy or faint    Fingernails, skin or lips look blue, purple , or gray  Prevention  Here are steps you can take to help prevent an infection:     Keep good hand washing habits.    Don t have close contact with people who have sore throats, colds, or other upper respiratory infections.    Don t smoke, and stay away from secondhand smoke.    Stay up to date with of your vaccines.  [employer] last reviewed this educational content on 12/1/2019 2000-2021 [employer]. All rights reserved. This  information is not intended as a substitute for professional medical care. Always follow your healthcare professional's instructions.        Dear Waylon Santiago    After reviewing your responses, I've been able to diagnose you with?sinusitis .?     Based on your responses and diagnosis, I have prescribed Augmentin to treat your symptoms. I have sent this to your pharmacy.?     It is also important to stay well hydrated, get lots of rest and take over-the-counter decongestants,?tylenol?or ibuprofen if you?are able to?take those medications per your primary care provider to help relieve discomfort.?     It is important that you take?all of?your prescribed medication even if your symptoms are improving after a few doses.? Taking?all of?your medicine helps prevent the symptoms from returning.?     If your symptoms worsen, you develop severe headache, vomiting, high fever (>102), or are not improving in 7 days, please contact your primary care provider for an appointment or visit any of our convenient Walk-in Care or Urgent Care Centers to be seen which can be found on our website?here.?

## 2022-06-02 DIAGNOSIS — M1A.0790 IDIOPATHIC CHRONIC GOUT OF FOOT WITHOUT TOPHUS, UNSPECIFIED LATERALITY: ICD-10-CM

## 2022-06-03 RX ORDER — ALLOPURINOL 300 MG/1
1 TABLET ORAL DAILY
Qty: 90 TABLET | Refills: 0 | Status: SHIPPED | OUTPATIENT
Start: 2022-06-03 | End: 2022-09-10

## 2022-06-03 NOTE — TELEPHONE ENCOUNTER
Last visit 10/1/21     Routing refill request to provider for review/approval because:      Giana PADILLA, Triage RN  [facility]

## 2022-08-31 ENCOUNTER — E-VISIT (OUTPATIENT)
Dept: FAMILY MEDICINE | Facility: CLINIC | Age: 52
End: 2022-08-31
Payer: COMMERCIAL

## 2022-08-31 DIAGNOSIS — B89 PARASITE INFESTATION: ICD-10-CM

## 2022-08-31 DIAGNOSIS — B89 PARASITE INFESTATION: Primary | ICD-10-CM

## 2022-08-31 DIAGNOSIS — B82.9 INTESTINAL PARASITISM: ICD-10-CM

## 2022-08-31 PROCEDURE — 99421 OL DIG E/M SVC 5-10 MIN: CPT | Performed by: INTERNAL MEDICINE

## 2022-08-31 RX ORDER — ALBENDAZOLE 200 MG/1
TABLET, FILM COATED ORAL
Qty: 2 TABLET | Refills: 0 | Status: SHIPPED | OUTPATIENT
Start: 2022-08-31 | End: 2023-03-15

## 2022-08-31 NOTE — TELEPHONE ENCOUNTER
Provider E-Visit time total (minutes): 8 minutes.     Sent in my chart message as below.     Carlos A Connors,     Sure, I have sent in the standard regimen of common intestinal parasite treatment which I recommend to submit the stool exam also ordered before to taking the medication to make sure just in case if it turns out to be any specific work needing something different. Stool studies placed     Please let me know if you have any questions.     Thank you,  Lea Arreola MD on 8/31/2022 at 12:40 PM

## 2022-08-31 NOTE — PATIENT INSTRUCTIONS
Thank you for choosing us for your care. I have placed an order for a prescription so that you can start treatment. View your full visit summary for details by clicking on the link below. Your pharmacist will able to address any questions you may have about the medication.     If you're not feeling better within 5-7 days, please schedule an appointment.  You can schedule an appointment right here in [facility], or call [phone]  If the visit is for the same symptoms as your eVisit, we'll refund the cost of your eVisit if seen within seven days.

## 2022-09-03 RX ORDER — ALBENDAZOLE 200 MG/1
TABLET, FILM COATED ORAL
Qty: 2 TABLET | Refills: 0 | OUTPATIENT
Start: 2022-09-03

## 2022-09-03 NOTE — TELEPHONE ENCOUNTER
Alternative sent as requested, please let the patient know on formulary issue.     Thank you,  Lea Arreola MD on 9/3/2022 at 4:25 PM

## 2022-09-07 ENCOUNTER — MYC MEDICAL ADVICE (OUTPATIENT)
Dept: FAMILY MEDICINE | Facility: CLINIC | Age: 52
End: 2022-09-07

## 2022-09-08 DIAGNOSIS — M1A.0790 IDIOPATHIC CHRONIC GOUT OF FOOT WITHOUT TOPHUS, UNSPECIFIED LATERALITY: ICD-10-CM

## 2022-09-08 NOTE — TELEPHONE ENCOUNTER
Please see My Chart Message and advise. Messages were post e visit. I see no MyChart messages since the e visit on 8/31/22.  Symptoms have resolved. No order in chart for FIT  Triage to contact the patient.  Roxana Kyle RN

## 2022-09-09 DIAGNOSIS — I10 ESSENTIAL HYPERTENSION: ICD-10-CM

## 2022-09-09 RX ORDER — AMLODIPINE BESYLATE 10 MG/1
10 TABLET ORAL DAILY
Qty: 90 TABLET | Refills: 0 | Status: SHIPPED | OUTPATIENT
Start: 2022-09-09 | End: 2022-12-09

## 2022-09-09 RX ORDER — OLMESARTAN MEDOXOMIL 40 MG/1
40 TABLET ORAL DAILY
Qty: 90 TABLET | Refills: 0 | Status: SHIPPED | OUTPATIENT
Start: 2022-09-09 | End: 2022-12-09

## 2022-09-09 NOTE — TELEPHONE ENCOUNTER
Please just CALL for scheduling , my patients cannot respond [employer] messages though they view it.     Please call the patient and schedule Annual physical which he is due at this time, to further take care of his medical conditions and medications. ( Please double book near Virtual slots on my schedule / Use same day slots on my schedule depending on pt availability in next 3 weeks)     Thank you,  Lea Arreola MD on 9/9/2022

## 2022-09-10 RX ORDER — ALLOPURINOL 300 MG/1
1 TABLET ORAL DAILY
Qty: 90 TABLET | Refills: 0 | Status: SHIPPED | OUTPATIENT
Start: 2022-09-10 | End: 2022-12-06

## 2022-09-11 NOTE — TELEPHONE ENCOUNTER
Please CALL for scheduling , as this is double booking      Please call the patient and schedule  Annual physical which he is due at this time, to further take care of his medical conditions and medications. ( Please double book near Virtual slots on my schedule but he will need Inperson visit / Use same day slots on my schedule depending on pt availability in next 3 weeks )     Refill done.    Thank you,  Lea Arreola MD on 9/10/2022 at 7:20 PM

## 2022-09-13 NOTE — TELEPHONE ENCOUNTER
Pt read [email or web address] message with message from Dr. Arreola.     Sienna LUBIN RN  [facility]

## 2022-09-23 DIAGNOSIS — R73.01 IMPAIRED FASTING GLUCOSE: ICD-10-CM

## 2022-09-26 NOTE — TELEPHONE ENCOUNTER
Routing refill request to provider for review/approval because:  Labs not current:    Hemoglobin A1C   Date Value Ref Range Status   10/01/2021 5.3 0.0 - 5.6 % Final     Comment:     Normal <5.7%   Prediabetes 5.7-6.4%    Diabetes 6.5% or higher     Note: Adopted from ADA consensus guidelines.   08/20/2020 5.0 0 - 5.6 % Final     Comment:     Normal <5.7% Prediabetes 5.7-6.4%  Diabetes 6.5% or higher - adopted from ADA   consensus guidelines.       Sienna LUBIN RN  [facility]

## 2022-12-06 ENCOUNTER — MYC REFILL (OUTPATIENT)
Dept: FAMILY MEDICINE | Facility: CLINIC | Age: 52
End: 2022-12-06

## 2022-12-06 ENCOUNTER — DOCUMENTATION ONLY (OUTPATIENT)
Dept: LAB | Facility: CLINIC | Age: 52
End: 2022-12-06

## 2022-12-06 ENCOUNTER — MYC MEDICAL ADVICE (OUTPATIENT)
Dept: FAMILY MEDICINE | Facility: CLINIC | Age: 52
End: 2022-12-06

## 2022-12-06 DIAGNOSIS — M1A.0790 IDIOPATHIC CHRONIC GOUT OF FOOT WITHOUT TOPHUS, UNSPECIFIED LATERALITY: ICD-10-CM

## 2022-12-06 NOTE — PROGRESS NOTES
Please place or confirm orders for upcoming lab appointment on 12/09/2022. THANK YOU.    If no labs are needed at this time please have the Care Team contact patient regarding this information and cancel lab appointment. Thank you.     Sierra LINO

## 2022-12-07 DIAGNOSIS — R73.01 IMPAIRED FASTING GLUCOSE: ICD-10-CM

## 2022-12-07 DIAGNOSIS — M1A.0790 IDIOPATHIC CHRONIC GOUT OF FOOT WITHOUT TOPHUS, UNSPECIFIED LATERALITY: ICD-10-CM

## 2022-12-07 DIAGNOSIS — I10 ESSENTIAL HYPERTENSION: ICD-10-CM

## 2022-12-07 NOTE — PROGRESS NOTES
Will evaluate patient in his upcoming OV in 1/2023 and place labs at that time. Not needed at this time

## 2022-12-07 NOTE — TELEPHONE ENCOUNTER
Medication Question or Refill        What medication are you calling about (include dose and sig)?: Amlodipine, Metformin, Olmesartan    Controlled Substance Agreement on file:   CSA -- Patient Level:    CSA: None found at the patient level.       Who prescribed the medication?: Dr. Arreola    Do you need a refill? Yes: Pt has a physical appt scheduled for 1/24/2023    When did you use the medication last? Today    Patient offered an appointment? No    Do you have any questions or concerns?  No    Preferred Pharmacy:    [facility]/pharmacy #[id number] [address] [address]  Phone: [phone] Fax: [phone]    Could we send this information to you in [employer] or would you prefer to receive a phone call?:   Patient would like to be contacted via [employer]     Lynsey Fernández,  [facility]

## 2022-12-09 RX ORDER — AMLODIPINE BESYLATE 10 MG/1
10 TABLET ORAL DAILY
Qty: 90 TABLET | Refills: 0 | Status: SHIPPED | OUTPATIENT
Start: 2022-12-09 | End: 2023-03-11

## 2022-12-09 RX ORDER — ALLOPURINOL 300 MG/1
TABLET ORAL
Qty: 90 TABLET | Refills: 0 | Status: SHIPPED | OUTPATIENT
Start: 2022-12-09 | End: 2023-03-15

## 2022-12-09 RX ORDER — ALLOPURINOL 300 MG/1
1 TABLET ORAL DAILY
Qty: 90 TABLET | Refills: 0 | Status: SHIPPED | OUTPATIENT
Start: 2022-12-09 | End: 2023-03-13

## 2022-12-09 RX ORDER — OLMESARTAN MEDOXOMIL 40 MG/1
40 TABLET ORAL DAILY
Qty: 90 TABLET | Refills: 0 | Status: SHIPPED | OUTPATIENT
Start: 2022-12-09 | End: 2023-03-29

## 2022-12-10 NOTE — TELEPHONE ENCOUNTER
Refill done already and replied the Lab message in another encounter.    Last seen pt on 8/2020 as inperson and later Virtual visit in 10/2021 for follow up. May end up adding additional labs after evaluation.     Please let the patient know to keep up the upcoming appointment to take care of his medications and medical conditions.     Thank you,  Lea Arreola MD on 12/10/2022 at 4:39 PM

## 2022-12-12 NOTE — TELEPHONE ENCOUNTER
Pt notified of provider response via [employer].     Sienna LUBIN RN  [facility]

## 2023-03-01 ENCOUNTER — E-VISIT (OUTPATIENT)
Dept: URGENT CARE | Facility: URGENT CARE | Age: 53
End: 2023-03-01
Payer: COMMERCIAL

## 2023-03-01 DIAGNOSIS — J06.9 VIRAL URI: Primary | ICD-10-CM

## 2023-03-01 PROCEDURE — 99421 OL DIG E/M SVC 5-10 MIN: CPT | Performed by: PHYSICIAN ASSISTANT

## 2023-03-01 NOTE — PATIENT INSTRUCTIONS
Viral Upper Respiratory Illness (Adult)    You have a viral upper respiratory illness (URI), which is another term for the common cold. This illness is contagious during the first few days. It is spread through the air by coughing and sneezing. It may also be spread by direct contact (touching the sick person and then touching your own eyes, nose, or mouth). Frequent handwashing will decrease risk of spread. Most viral illnesses go away within 7 to 10 days with rest and simple home remedies. Sometimes the illness may last for several weeks. Antibiotics will not kill a virus, and they are generally not prescribed for this condition.  Home care    If symptoms are severe, rest at home for the first 2 to 3 days. When you resume activity, don't let yourself get too tired.    Don't smoke. If you need help stopping, talk with your healthcare provider.    Avoid being exposed to cigarette smoke (yours or others ).    You may use acetaminophen or ibuprofen to control pain and fever, unless another medicine was prescribed. If you have chronic liver or kidney disease, have ever had a stomach ulcer or gastrointestinal bleeding, or are taking blood-thinning medicines, talk with your healthcare provider before using these medicines. Aspirin should never be given to anyone under 18 years of age who is ill with a viral infection or fever. It may cause severe liver or brain damage.    Your appetite may be poor, so a light diet is fine. Stay well hydrated by drinking 6 to 8 glasses of fluids per day (water, soft drinks, juices, tea, or soup). Extra fluids will help loosen secretions in the nose and lungs.    Over-the-counter cold medicines will not shorten the length of time you re sick, but they may be helpful for the following symptoms: cough, sore throat, and nasal and sinus congestion. If you take prescription medicines, ask your healthcare provider or pharmacist which over-the-counter medicines are safe to use. (Note: Don't  use decongestants if you have high blood pressure.)  Follow-up care  Follow up with your healthcare provider, or as advised.  When to seek medical advice  Call your healthcare provider right away if any of these occur:    Cough with lots of colored sputum (mucus)    Severe headache; face, neck, or ear pain    Difficulty swallowing due to throat pain    Fever of 100.4 F (38 C) or higher, or as directed by your healthcare provider  Call 911  Call 911 if any of these occur:    Chest pain, shortness of breath, wheezing, or difficulty breathing    Coughing up blood    Very severe pain with swallowing, especially if it goes along with a muffled voice   [employer] last reviewed this educational content on 6/1/2018 2000-2021 [employer]. All rights reserved. This information is not intended as a substitute for professional medical care. Always follow your healthcare professional's instructions.        Dear Waylon Santiago    After reviewing your responses, I've been able to diagnose you with a viral infection.    You have a viral upper respiratory infection.  This commonly causes symptoms of your throat, nose, sinuses and bronchi.    You do not need to do anything besides rest and hydrate and your body will get over this.  Sometimes it can take up to 2 weeks to do so.  And the symptoms can be very annoying.    People are commonly contagious for about 3 to 5 days.  Wearing a mask will significantly reduce your risk of transmitting this to someone else if you are within that timeframe.    Some things that she can do to help with her symptoms include:    Pain, malaise and inflammation:  Ibuprofen and Tylenol for pain and inflammation.  I prefer ibuprofen  Ibuprofen 400-600 mg (2-3 of the 200 mg OTC tablets or 400-600 mg of the children's liquid) up to 4 times daily with food or milk  Tylenol 500-1000 mg every 8 hours as needed    For nasal congestion and drainage  Flonase/fluticasone nasal spray 2 sprays in  each nostril once a day for 1 - 4 weeks.  This may take several days to become effective.  Consider saline nasal rinses  Psuedofed can help if you tolerate it but do not take if you have high blood pressure     Cough:  Mucinex or guaifenesin can help get mucus out of your body and help with cough.  Dextromethorphan is a cough suppressant that may be helpful  Tessalon Perles may be prescribed    Ear fullness or pain:  Flonase as above  Ibuprofen as above  Otovent, which is a balloon you blow up with your nose and helps pop the ears and regulate the pressure can be bought off of [employer] and works quite well    Supplements that may also be helpful:  Cold snap  Zicam  Zinc    Be sure to eat nutrient dense foods with a good mixture of fats, carbohydrates and proteins.  Your body burns more calories while sick.    If your symptoms worsen, you develop severe headache, vomiting, high fever (>102), or are not improving in 7 days, please contact your primary care provider for an appointment or visit any of our convenient Walk-in Care or Urgent Care Centers to be seen which can be found on our website?here.?     Thanks again for choosing?us?as your health care partner,?   ?  Maximiliano Cerrato PA-C?

## 2023-03-10 DIAGNOSIS — I10 ESSENTIAL HYPERTENSION: ICD-10-CM

## 2023-03-11 DIAGNOSIS — M1A.0790 IDIOPATHIC CHRONIC GOUT OF FOOT WITHOUT TOPHUS, UNSPECIFIED LATERALITY: ICD-10-CM

## 2023-03-11 RX ORDER — AMLODIPINE BESYLATE 10 MG/1
10 TABLET ORAL DAILY
Qty: 90 TABLET | Refills: 0 | Status: SHIPPED | OUTPATIENT
Start: 2023-03-11 | End: 2023-06-14

## 2023-03-13 RX ORDER — ALLOPURINOL 300 MG/1
TABLET ORAL
Qty: 30 TABLET | Refills: 0 | Status: SHIPPED | OUTPATIENT
Start: 2023-03-13 | End: 2023-04-14

## 2023-03-13 NOTE — TELEPHONE ENCOUNTER
Not seen pt after 10/2021. Further refills only after Physical on 3/15/2023.     Short Refill done. Multiple isabella fills already given.   Lea Arreola MD on 3/13/2023

## 2023-03-15 ENCOUNTER — MYC MEDICAL ADVICE (OUTPATIENT)
Dept: FAMILY MEDICINE | Facility: CLINIC | Age: 53
End: 2023-03-15

## 2023-03-15 ENCOUNTER — OFFICE VISIT (OUTPATIENT)
Dept: FAMILY MEDICINE | Facility: CLINIC | Age: 53
End: 2023-03-15
Payer: COMMERCIAL

## 2023-03-15 ENCOUNTER — ANCILLARY PROCEDURE (OUTPATIENT)
Dept: GENERAL RADIOLOGY | Facility: CLINIC | Age: 53
End: 2023-03-15
Attending: INTERNAL MEDICINE
Payer: COMMERCIAL

## 2023-03-15 VITALS
WEIGHT: 258 LBS | OXYGEN SATURATION: 97 % | HEART RATE: 101 BPM | TEMPERATURE: 97.9 F | HEIGHT: 67 IN | DIASTOLIC BLOOD PRESSURE: 82 MMHG | RESPIRATION RATE: 10 BRPM | BODY MASS INDEX: 40.49 KG/M2 | SYSTOLIC BLOOD PRESSURE: 130 MMHG

## 2023-03-15 DIAGNOSIS — R63.5 WEIGHT GAIN: ICD-10-CM

## 2023-03-15 DIAGNOSIS — I10 ESSENTIAL HYPERTENSION: ICD-10-CM

## 2023-03-15 DIAGNOSIS — J01.90 ACUTE NON-RECURRENT SINUSITIS, UNSPECIFIED LOCATION: ICD-10-CM

## 2023-03-15 DIAGNOSIS — E78.5 HYPERLIPIDEMIA LDL GOAL <100: ICD-10-CM

## 2023-03-15 DIAGNOSIS — Z13.220 SCREENING FOR HYPERLIPIDEMIA: ICD-10-CM

## 2023-03-15 DIAGNOSIS — R73.03 PREDIABETES: ICD-10-CM

## 2023-03-15 DIAGNOSIS — D17.30 LIPOMA OF SKIN AND SUBCUTANEOUS TISSUE: ICD-10-CM

## 2023-03-15 DIAGNOSIS — M25.511 RIGHT SHOULDER PAIN, UNSPECIFIED CHRONICITY: ICD-10-CM

## 2023-03-15 DIAGNOSIS — M54.16 LUMBAR RADICULOPATHY: ICD-10-CM

## 2023-03-15 DIAGNOSIS — E66.01 MORBID OBESITY (H): ICD-10-CM

## 2023-03-15 DIAGNOSIS — Z12.5 ENCOUNTER FOR PROSTATE CANCER SCREENING: ICD-10-CM

## 2023-03-15 DIAGNOSIS — M1A.0790 IDIOPATHIC CHRONIC GOUT OF FOOT WITHOUT TOPHUS, UNSPECIFIED LATERALITY: ICD-10-CM

## 2023-03-15 DIAGNOSIS — R94.5 ABNORMAL RESULTS OF LIVER FUNCTION STUDIES: ICD-10-CM

## 2023-03-15 DIAGNOSIS — Z12.11 SCREEN FOR COLON CANCER: ICD-10-CM

## 2023-03-15 DIAGNOSIS — Z00.00 ROUTINE GENERAL MEDICAL EXAMINATION AT A HEALTH CARE FACILITY: Primary | ICD-10-CM

## 2023-03-15 DIAGNOSIS — D49.2 ATYPICAL SQUAMOPROLIFERATIVE SKIN LESION: ICD-10-CM

## 2023-03-15 LAB
ALBUMIN SERPL BCG-MCNC: 4.5 G/DL (ref 3.5–5.2)
ALP SERPL-CCNC: 110 U/L (ref 40–129)
ALT SERPL W P-5'-P-CCNC: 55 U/L (ref 10–50)
ANION GAP SERPL CALCULATED.3IONS-SCNC: 14 MMOL/L (ref 7–15)
AST SERPL W P-5'-P-CCNC: 31 U/L (ref 10–50)
BILIRUB SERPL-MCNC: 0.3 MG/DL
BUN SERPL-MCNC: 14.9 MG/DL (ref 6–20)
CALCIUM SERPL-MCNC: 9.3 MG/DL (ref 8.6–10)
CHLORIDE SERPL-SCNC: 102 MMOL/L (ref 98–107)
CHOLEST SERPL-MCNC: 152 MG/DL
CREAT SERPL-MCNC: 0.86 MG/DL (ref 0.67–1.17)
DEPRECATED HCO3 PLAS-SCNC: 25 MMOL/L (ref 22–29)
ERYTHROCYTE [DISTWIDTH] IN BLOOD BY AUTOMATED COUNT: 12.8 % (ref 10–15)
GFR SERPL CREATININE-BSD FRML MDRD: >90 ML/MIN/1.73M2
GLUCOSE SERPL-MCNC: 102 MG/DL (ref 70–99)
HBA1C MFR BLD: 5.3 % (ref 0–5.6)
HCT VFR BLD AUTO: 45.6 % (ref 40–53)
HDLC SERPL-MCNC: 22 MG/DL
HGB BLD-MCNC: 15.4 G/DL (ref 13.3–17.7)
LDLC SERPL CALC-MCNC: 97 MG/DL
MCH RBC QN AUTO: 32.2 PG (ref 26.5–33)
MCHC RBC AUTO-ENTMCNC: 33.8 G/DL (ref 31.5–36.5)
MCV RBC AUTO: 95 FL (ref 78–100)
NONHDLC SERPL-MCNC: 130 MG/DL
PLATELET # BLD AUTO: 243 10E3/UL (ref 150–450)
POTASSIUM SERPL-SCNC: 4.2 MMOL/L (ref 3.4–5.3)
PROT SERPL-MCNC: 7.3 G/DL (ref 6.4–8.3)
PSA SERPL DL<=0.01 NG/ML-MCNC: 0.95 NG/ML (ref 0–3.5)
RBC # BLD AUTO: 4.78 10E6/UL (ref 4.4–5.9)
SODIUM SERPL-SCNC: 141 MMOL/L (ref 136–145)
TRIGL SERPL-MCNC: 164 MG/DL
TSH SERPL DL<=0.005 MIU/L-ACNC: 3.75 UIU/ML (ref 0.3–4.2)
WBC # BLD AUTO: 7.1 10E3/UL (ref 4–11)

## 2023-03-15 PROCEDURE — 36415 COLL VENOUS BLD VENIPUNCTURE: CPT | Performed by: INTERNAL MEDICINE

## 2023-03-15 PROCEDURE — 80053 COMPREHEN METABOLIC PANEL: CPT | Performed by: INTERNAL MEDICINE

## 2023-03-15 PROCEDURE — 99396 PREV VISIT EST AGE 40-64: CPT | Performed by: INTERNAL MEDICINE

## 2023-03-15 PROCEDURE — 73030 X-RAY EXAM OF SHOULDER: CPT | Mod: TC | Performed by: RADIOLOGY

## 2023-03-15 PROCEDURE — 83036 HEMOGLOBIN GLYCOSYLATED A1C: CPT | Performed by: INTERNAL MEDICINE

## 2023-03-15 PROCEDURE — G0103 PSA SCREENING: HCPCS | Performed by: INTERNAL MEDICINE

## 2023-03-15 PROCEDURE — 85027 COMPLETE CBC AUTOMATED: CPT | Performed by: INTERNAL MEDICINE

## 2023-03-15 PROCEDURE — 99215 OFFICE O/P EST HI 40 MIN: CPT | Mod: 25 | Performed by: INTERNAL MEDICINE

## 2023-03-15 PROCEDURE — 80061 LIPID PANEL: CPT | Performed by: INTERNAL MEDICINE

## 2023-03-15 PROCEDURE — 84443 ASSAY THYROID STIM HORMONE: CPT | Performed by: INTERNAL MEDICINE

## 2023-03-15 RX ORDER — FLUTICASONE PROPIONATE 50 MCG
1 SPRAY, SUSPENSION (ML) NASAL DAILY
Qty: 18.2 ML | Refills: 0 | Status: SHIPPED | OUTPATIENT
Start: 2023-03-15 | End: 2023-05-16

## 2023-03-15 RX ORDER — AZITHROMYCIN 250 MG/1
TABLET, FILM COATED ORAL
Qty: 6 TABLET | Refills: 0 | Status: SHIPPED | OUTPATIENT
Start: 2023-03-15 | End: 2023-03-20

## 2023-03-15 ASSESSMENT — ENCOUNTER SYMPTOMS
ABDOMINAL PAIN: 0
DIZZINESS: 0
COUGH: 1
CHILLS: 0
NERVOUS/ANXIOUS: 1
JOINT SWELLING: 0
WEAKNESS: 0
SHORTNESS OF BREATH: 0
FEVER: 0
ARTHRALGIAS: 0
MYALGIAS: 1
DYSURIA: 0
HEMATURIA: 0
PARESTHESIAS: 0
HEADACHES: 0
DIARRHEA: 0
NAUSEA: 0
HEMATOCHEZIA: 0
SORE THROAT: 0
CONSTIPATION: 0
HEARTBURN: 0
PALPITATIONS: 0
EYE PAIN: 0
FREQUENCY: 0

## 2023-03-15 ASSESSMENT — PAIN SCALES - GENERAL: PAINLEVEL: NO PAIN (0)

## 2023-03-15 NOTE — PROGRESS NOTES
SUBJECTIVE:   CC: Waylon is an 52 year old who presents for preventative health visit.     Patient has been advised of split billing requirements and indicates understanding: Yes  Healthy Habits:     Getting at least 3 servings of Calcium per day:  NO    Bi-annual eye exam:  Yes    Dental care twice a year:  Yes    Sleep apnea or symptoms of sleep apnea:  None    Diet:  Low salt    Frequency of exercise:  None    Taking medications regularly:  Yes    Medication side effects:  Not applicable    PHQ-2 Total Score: 0    Additional concerns today:  Yes      Today's PHQ-2 Score:   PHQ-2 ( 1999 [employer]) 3/15/2023   Q1: Little interest or pleasure in doing things 0   Q2: Feeling down, depressed or hopeless 0   PHQ-2 Score 0   PHQ-2 Total Score (12-17 Years)- Positive if 3 or more points; Administer PHQ-A if positive -   Q1: Little interest or pleasure in doing things Not at all   Q2: Feeling down, depressed or hopeless Not at all   PHQ-2 Score 0       Have you ever done Advance Care Planning? (For example, a Health Directive, POLST, or a discussion with a medical provider or your loved ones about your wishes): No, advance care planning information given to patient to review.  Patient plans to discuss their wishes with loved ones or provider.      Social History     Tobacco Use     Smoking status: Never     Smokeless tobacco: Never   Substance Use Topics     Alcohol use: Yes     Comment: occ.         Alcohol Use 3/15/2023   Prescreen: >3 drinks/day or >7 drinks/week? No   AUDIT SCORE  -     AUDIT - Alcohol Use Disorders Identification Test - Reproduced from the World Health Organization Audit 2001 (Second Edition) 6/26/2019   1.  How often do you have a drink containing alcohol? 2 to 3 times a week   2.  How many drinks containing alcohol do you have on a typical day when you are drinking? 1 or 2   3.  How often do you have five or more drinks on one occasion? Never   4.  How often during the last year have you found that you  were not able to stop drinking once you had started? Never   5.  How often during the last year have you failed to do what was normally expected of you because of drinking? Never   6.  How often during the last year have you needed a first drink in the morning to get yourself going after a heavy drinking session? Never   7.  How often during the last year have you had a feeling of guilt or remorse after drinking? Never   8.  How often during the last year have you been unable to remember what happened the night before because of your drinking? Never   9.  Have you or someone else been injured because of your drinking? No   10. Has a relative, friend, doctor or other health care worker been concerned about your drinking or suggested you cut down? No   TOTAL SCORE 3       Last PSA:   PSA   Date Value Ref Range Status   06/28/2019 0.70 0 - 4 ug/L Final     Comment:     Assay Method:  Chemiluminescence using Siemens Vista analyzer       Reviewed orders with patient. Reviewed health maintenance and updated orders accordingly - Yes  Lab work is in process  Labs reviewed in EPIC    Reviewed and updated as needed this visit by clinical staff   Tobacco  Allergies  Meds  Problems  Med Hx  Surg Hx  Fam Hx  Soc   Hx        Reviewed and updated as needed this visit by Provider   Tobacco   Meds  Problems  Med Hx  Surg Hx  Fam Hx         Past Medical History:   Diagnosis Date     Hyperlipidemia      Hypertension       History reviewed. No pertinent surgical history.    Review of Systems   Constitutional: Negative for chills and fever.   HENT: Negative for congestion, ear pain, hearing loss and sore throat.    Eyes: Negative for pain and visual disturbance.   Respiratory: Positive for cough. Negative for shortness of breath.    Cardiovascular: Negative for chest pain, palpitations and peripheral edema.   Gastrointestinal: Negative for abdominal pain, constipation, diarrhea, heartburn, hematochezia and nausea.  "  Genitourinary: Negative for dysuria, frequency, genital sores, hematuria, impotence, penile discharge and urgency.   Musculoskeletal: Positive for myalgias. Negative for arthralgias and joint swelling.   Skin: Negative for rash.   Neurological: Negative for dizziness, weakness, headaches and paresthesias.   Psychiatric/Behavioral: Negative for mood changes. The patient is nervous/anxious.      CONSTITUTIONAL: NEGATIVE for fever, chills, change in weight  INTEGUMENTARY/SKIN: NEGATIVE for worrisome rashes, moles or lesions  EYES: NEGATIVE for vision changes or irritation  ENT: NEGATIVE for ear, mouth and throat problems  RESP: NEGATIVE for significant cough or SOB  CV: NEGATIVE for chest pain, palpitations or peripheral edema  GI: NEGATIVE for nausea, abdominal pain, heartburn, or change in bowel habits   male: negative for dysuria, hematuria, decreased urinary stream, erectile dysfunction, urethral discharge  MUSCULOSKELETAL: NEGATIVE for significant arthralgias or myalgia  NEURO: NEGATIVE for weakness, dizziness or paresthesias  PSYCHIATRIC: NEGATIVE for changes in mood or affect    OBJECTIVE:   /82   Pulse 101   Temp 97.9  F (36.6  C) (Temporal)   Resp 10   Ht 1.689 m (5' 6.5\")   Wt 117 kg (258 lb)   SpO2 97%   BMI 41.02 kg/m      Physical Exam  GENERAL: healthy, alert and no distress  EYES: Eyes grossly normal to inspection, PERRL and conjunctivae and sclerae normal  HENT: ear canals and TM's normal, nose and mouth without ulcers or lesions  NECK: no adenopathy, no asymmetry, masses, or scars and thyroid normal to palpation  Upper back positive for lipoma measuring 8 cm in diameter oval in shape on the left side.  RESP: lungs clear to auscultation - no rales, rhonchi or wheezes  CV: regular rate and rhythm, normal S1 S2, no S3 or S4, no murmur, click or rub, no peripheral edema and peripheral pulses strong  ABDOMEN: soft, nontender, no hepatosplenomegaly, no masses and bowel sounds normal  MS: no " gross musculoskeletal defects noted, no edema  RT Shoulder :  Negative for tenderness on palpation of bony prominences of right shoulder, restricted range of movements in abduction and overhead abduction.   SKIN: Positive for squama proliferative skin lesions on the face and upper extremities on the left arm.  NEURO: Normal strength and tone, mentation intact and speech normal  PSYCH: mentation appears normal, affect normal/bright    Diagnostic Test Results:  Labs reviewed in Epic    ASSESSMENT/PLAN:         Assessment and Plan  1. Routine general medical examination at a health care facility  Last seen pt on 8/2020 as inperson and later Virtual visit in 10/2021 for follow up..  Patient has been following orthopedics for lumbar radiculopathy back pain issues last seen in August 2022 at [facility] left L4-L5 transforaminal epidural injection .  Last lab work in October 2021 showing elevated LFTs, low HDL levels, hypertriglyceridemia, normal CBC.  Can recheck all this labs and add PSA level.  As opted.  - Colonoscopy Screening  Referral; Future  - Lipid panel reflex to direct LDL Non-fasting; Future  - REVIEW OF HEALTH MAINTENANCE PROTOCOL ORDERS  - Hemoglobin A1c; Future  - Comprehensive metabolic panel (BMP + Alb, Alk Phos, ALT, AST, Total. Bili, TP); Future  - CBC with platelets; Future  - TSH with free T4 reflex; Future  - PSA, screen; Future  - Lipid panel reflex to direct LDL Non-fasting  - Hemoglobin A1c  - Comprehensive metabolic panel (BMP + Alb, Alk Phos, ALT, AST, Total. Bili, TP)  - CBC with platelets  - TSH with free T4 reflex  - PSA, screen    2. Morbid obesity (H)  Noted, patient is very worried that given his unhealthy habits recently his A1c might be worsened on the prediabetes check which we are going to do today though he is being currently treated with metformin which can help in his weight loss.  Discussed on benefits of weight loss which can help in improvement of his comorbidities.    3.  Lumbar radiculopathy  Chronic problem, well-controlled.  Reviewed the outside records of  [facility] left L4-L5 transforaminal epidural injection .  Discussed with the patient which she still has on and off numbness on the thighs, offered gabapentin which she declined at this time.  - Comprehensive metabolic panel (BMP + Alb, Alk Phos, ALT, AST, Total. Bili, TP); Future  - Comprehensive metabolic panel (BMP + Alb, Alk Phos, ALT, AST, Total. Bili, TP)    4. Idiopathic chronic gout of foot without tophus, unspecified laterality  Chronic stable condition, patient feels much better after we stopped hydrochlorothiazide on his past visits which can trigger gout attacks.  Continue current allopurinol.    5. Essential hypertension  Well-controlled, continue current olmesartan 40 mg daily, amlodipine 10 mg daily.    6. Screen for colon cancer  - Colonoscopy Screening  Referral; Future    7. Screening for hyperlipidemia  - Lipid panel reflex to direct LDL Non-fasting; Future  - Lipid panel reflex to direct LDL Non-fasting    8. Hyperlipidemia LDL goal <100  - Lipid panel reflex to direct LDL Non-fasting; Future  - Lipid panel reflex to direct LDL Non-fasting    9. Abnormal results of liver function studies  - Comprehensive metabolic panel (BMP + Alb, Alk Phos, ALT, AST, Total. Bili, TP); Future  - Comprehensive metabolic panel (BMP + Alb, Alk Phos, ALT, AST, Total. Bili, TP)    10. Encounter for prostate cancer screening  - PSA, screen; Future  - PSA, screen    11. Prediabetes  Last A1c in October 2021 remaining at 5.3% with current metformin.   - Hemoglobin A1c; Future  - Hemoglobin A1c    12. Weight gain  - TSH with free T4 reflex; Future  - TSH with free T4 reflex    13. Acute non-recurrent sinusitis, unspecified location  Ongoing problem, not been resolving for more than 10 days.  Physical exam appears as subacute sinusitis, will give him empiric Z-Alexandre and Flonase nasal spray for improvement.  - azithromycin  (ZITHROMAX) 250 MG tablet; Take 2 tablets (500 mg) by mouth daily for 1 day, THEN 1 tablet (250 mg) daily for 4 days.  Dispense: 6 tablet; Refill: 0  - fluticasone (FLONASE) 50 MCG/ACT nasal spray; Spray 1 spray into both nostrils daily  Dispense: 18.2 mL; Refill: 0    14. Lipoma of skin and subcutaneous tissue  Ongoing problem, physical exam positive for right upper back lipoma measuring around 8 cm in diameter oval in shape, discussed on his current discomfort when he lies flat on the bed.  Referral placed to general surgery for removal of this which patient understood and agreed with the plan.  - Adult General Surg Referral; Future    15. Right shoulder pain, unspecified chronicity  Ongoing problem, uncontrolled.  Does have risk factors of left shoulder frozen shoulder.  Physical exam negative for tenderness on palpation of bony prominences of right shoulder, restricted range of movements in abduction and overhead abduction.  Differential diagnosis of possible arthritis of the shoulder joint versus calcific tendinitis discussed with the patient.  Will check for x-ray of the shoulder and do further recommendations.  - XR Shoulder Right 2 Views; Future    16. Atypical squamoproliferative skin lesion  Multiple skin lesions on the face and upper extremities which appear suspicious as squama proliferative, discussed on possible need of excision biopsy versus skin check of dermatology for the referral placed as opted.  - Adult Dermatology Referral; Future      Over 40 minutes spent outside the preventive visit ( 20 minutes )  on reviewing patient chart,  face to face encounter, greater than 50% time spent with plan/cordination of care and documentation as above in my A/P.            Patient Instructions   As discussed , please do fasting labs ordered.     Please take your medication for blood pressure which is showing mild elevation today.     Placed referral to Colonoscopy and also general surgery referral to plan for  your Back lipoma referral .     Sent in antibiotic for your acute sinusitis    Placed X ray for your Rt shoulder pain.         ============================      Preventive Health Recommendations  Male Ages 50 - 64    Yearly exam:             See your health care provider every year in order to  o   Review health changes.   o   Discuss preventive care.    o   Review your medicines if your doctor has prescribed any.     Have a cholesterol test every 5 years, or more frequently if you are at risk for high cholesterol/heart disease.     Have a diabetes test (fasting glucose) every three years. If you are at risk for diabetes, you should have this test more often.     Have a colonoscopy at age 50, or have a yearly FIT test (stool test). These exams will check for colon cancer.      Talk with your health care provider about whether or not a prostate cancer screening test (PSA) is right for you.    You should be tested each year for STDs (sexually transmitted diseases), if you re at risk.     Shots: Get a flu shot each year. Get a tetanus shot every 10 years.     Nutrition:    Eat at least 5 servings of fruits and vegetables daily.     Eat whole-grain bread, whole-wheat pasta and brown rice instead of white grains and rice.     Get adequate Calcium and Vitamin D.     Lifestyle    Exercise for at least 150 minutes a week (30 minutes a day, 5 days a week). This will help you control your weight and prevent disease.     Limit alcohol to one drink per day.     No smoking.     Wear sunscreen to prevent skin cancer.     See your dentist every six months for an exam and cleaning.     See your eye doctor every 1 to 2 years.      Return in about 6 months (around 9/15/2023), or if symptoms worsen or fail to improve, for Follow up of last visit, If symptoms persist.    Lea Arreola MD  [facility]        Patient has been advised of split billing requirements and indicates understanding:  Yes      COUNSELING:   Reviewed preventive health counseling, as reflected in patient instructions  Special attention given to:        Regular exercise       Healthy diet/nutrition       Vision screening       Hearing screening       Alcohol Use        Colorectal cancer screening       Prostate cancer screening        He reports that he has never smoked. He has never used smokeless tobacco.        Lea Arreola MD  [facility]

## 2023-03-15 NOTE — TELEPHONE ENCOUNTER
Please see [facility] message and advise.    Eleni GONZALEZ RN  [facility] Triage Team

## 2023-03-15 NOTE — PATIENT INSTRUCTIONS
As discussed , please do fasting labs ordered.     Please take your medication for blood pressure which is showing mild elevation today.     Placed referral to Colonoscopy and also general surgery referral to plan for your Back lipoma referral .     Sent in antibiotic for your acute sinusitis    Placed X ray for your Rt shoulder pain.         ============================      Preventive Health Recommendations  Male Ages 50 - 64    Yearly exam:             See your health care provider every year in order to  o   Review health changes.   o   Discuss preventive care.    o   Review your medicines if your doctor has prescribed any.     Have a cholesterol test every 5 years, or more frequently if you are at risk for high cholesterol/heart disease.     Have a diabetes test (fasting glucose) every three years. If you are at risk for diabetes, you should have this test more often.     Have a colonoscopy at age 50, or have a yearly FIT test (stool test). These exams will check for colon cancer.      Talk with your health care provider about whether or not a prostate cancer screening test (PSA) is right for you.    You should be tested each year for STDs (sexually transmitted diseases), if you re at risk.     Shots: Get a flu shot each year. Get a tetanus shot every 10 years.     Nutrition:    Eat at least 5 servings of fruits and vegetables daily.     Eat whole-grain bread, whole-wheat pasta and brown rice instead of white grains and rice.     Get adequate Calcium and Vitamin D.     Lifestyle    Exercise for at least 150 minutes a week (30 minutes a day, 5 days a week). This will help you control your weight and prevent disease.     Limit alcohol to one drink per day.     No smoking.     Wear sunscreen to prevent skin cancer.     See your dentist every six months for an exam and cleaning.     See your eye doctor every 1 to 2 years.

## 2023-03-17 ENCOUNTER — TELEPHONE (OUTPATIENT)
Dept: FAMILY MEDICINE | Facility: CLINIC | Age: 53
End: 2023-03-17
Payer: COMMERCIAL

## 2023-03-17 NOTE — TELEPHONE ENCOUNTER
----- Message from Lea Arreola MD sent at 3/15/2023 11:51 PM CDT -----  Your X ray shoulder does show mild degenerative changes causing your symptoms. Please follow up with your Orthopedics for further recommendations.

## 2023-04-13 DIAGNOSIS — M1A.0790 IDIOPATHIC CHRONIC GOUT OF FOOT WITHOUT TOPHUS, UNSPECIFIED LATERALITY: ICD-10-CM

## 2023-04-14 RX ORDER — ALLOPURINOL 300 MG/1
TABLET ORAL
Qty: 30 TABLET | Refills: 0 | Status: SHIPPED | OUTPATIENT
Start: 2023-04-14 | End: 2023-05-15

## 2023-05-13 DIAGNOSIS — M1A.0790 IDIOPATHIC CHRONIC GOUT OF FOOT WITHOUT TOPHUS, UNSPECIFIED LATERALITY: ICD-10-CM

## 2023-05-15 RX ORDER — ALLOPURINOL 300 MG/1
TABLET ORAL
Qty: 90 TABLET | Refills: 1 | Status: SHIPPED | OUTPATIENT
Start: 2023-05-15 | End: 2023-11-17

## 2023-06-14 DIAGNOSIS — I10 ESSENTIAL HYPERTENSION: ICD-10-CM

## 2023-06-14 RX ORDER — AMLODIPINE BESYLATE 10 MG/1
10 TABLET ORAL DAILY
Qty: 90 TABLET | Refills: 2 | Status: SHIPPED | OUTPATIENT
Start: 2023-06-14 | End: 2024-01-17

## 2023-06-14 NOTE — TELEPHONE ENCOUNTER
Prescription approved per [facility] Refill Protocol.  Rosio Shi, RN  [facility] Triage Nurse

## 2023-07-24 DIAGNOSIS — R73.01 IMPAIRED FASTING GLUCOSE: ICD-10-CM

## 2023-09-14 DIAGNOSIS — I10 ESSENTIAL HYPERTENSION: ICD-10-CM

## 2023-09-14 RX ORDER — OLMESARTAN MEDOXOMIL 40 MG/1
TABLET ORAL
Qty: 90 TABLET | Refills: 0 | Status: SHIPPED | OUTPATIENT
Start: 2023-09-14 | End: 2023-12-05

## 2023-11-03 ENCOUNTER — TRANSFERRED RECORDS (OUTPATIENT)
Dept: HEALTH INFORMATION MANAGEMENT | Facility: CLINIC | Age: 53
End: 2023-11-03
Payer: COMMERCIAL

## 2023-11-17 DIAGNOSIS — M1A.0790 IDIOPATHIC CHRONIC GOUT OF FOOT WITHOUT TOPHUS, UNSPECIFIED LATERALITY: ICD-10-CM

## 2023-11-17 RX ORDER — ALLOPURINOL 300 MG/1
TABLET ORAL
Qty: 90 TABLET | Refills: 1 | Status: SHIPPED | OUTPATIENT
Start: 2023-11-17 | End: 2024-01-17

## 2023-11-17 NOTE — TELEPHONE ENCOUNTER
Refill done. Future refills after Annual physical in 3/2024.    Thank you,  Lea Arreola MD on 11/17/2023 at 5:37 PM

## 2023-11-21 NOTE — TELEPHONE ENCOUNTER
Left message for patient to return call. See message from Dr Arreola.  Sandra Day MA on 11/21/2023 at 8:52 AM

## 2023-11-28 NOTE — TELEPHONE ENCOUNTER
Spoke to pt and helped schedule appointment for 3/13/24 with PCP.    Tina Sharp RN on 11/28/2023 at 11:10 AM

## 2023-12-05 DIAGNOSIS — I10 ESSENTIAL HYPERTENSION: ICD-10-CM

## 2023-12-05 RX ORDER — OLMESARTAN MEDOXOMIL 40 MG/1
TABLET ORAL
Qty: 90 TABLET | Refills: 0 | Status: SHIPPED | OUTPATIENT
Start: 2023-12-05 | End: 2024-01-17

## 2023-12-05 NOTE — TELEPHONE ENCOUNTER
Prescription approved per [facility] Refill Protocol.  Rosio Shi, RN  [facility] Triage Nurse

## 2024-01-03 ENCOUNTER — MYC MEDICAL ADVICE (OUTPATIENT)
Dept: FAMILY MEDICINE | Facility: CLINIC | Age: 54
End: 2024-01-03
Payer: COMMERCIAL

## 2024-01-04 NOTE — TELEPHONE ENCOUNTER
Please see [employer] message and advise.  See the surgery referral is still active until 03/2024 but pt is requesting to be expedited.     Doris Ortiz RN

## 2024-01-05 NOTE — TELEPHONE ENCOUNTER
REFERRAL INFORMATION:  Referring Provider: Dr. Arreola  Referring Clinic: [place] [facility] [place] [facility]  Reason for Visit/Diagnosis: Lipoma of skin and subcutaneous tissue (Upper Back)       FUTURE VISIT INFORMATION:  Appointment Date: 2/16/2024  Appointment Time: 10 AM     NOTES RECORD STATUS  DETAILS   OFFICE NOTE from Referring Provider Internal [place] [facility] [place]:  3/15/23 -  [facility] OV with Dr. Arreola   OFFICE NOTE from Other Specialists N/A    HOSPITAL DISCHARGE SUMMARY/ ED VISITS  N/A    OPERATIVE REPORT N/A    PERTINENT LABS Internal    IMAGING (CT, MRI, US, XR)  Internal [facility]:  3/15/23 - XR R Shoulder

## 2024-01-05 NOTE — TELEPHONE ENCOUNTER
Provider Response to 2nd Level Triage Request    I have reviewed the RN documentation. My recommendation is:  Face To Face Visit. Same Day: place patient on my schedule, as I have availability. In 1-2 weeks

## 2024-01-17 ENCOUNTER — OFFICE VISIT (OUTPATIENT)
Dept: FAMILY MEDICINE | Facility: CLINIC | Age: 54
End: 2024-01-17
Payer: COMMERCIAL

## 2024-01-17 VITALS
BODY MASS INDEX: 39.68 KG/M2 | WEIGHT: 252.8 LBS | DIASTOLIC BLOOD PRESSURE: 76 MMHG | OXYGEN SATURATION: 95 % | TEMPERATURE: 97.5 F | HEIGHT: 67 IN | RESPIRATION RATE: 20 BRPM | SYSTOLIC BLOOD PRESSURE: 126 MMHG | HEART RATE: 96 BPM

## 2024-01-17 DIAGNOSIS — R73.03 PREDIABETES: ICD-10-CM

## 2024-01-17 DIAGNOSIS — M1A.0790 IDIOPATHIC CHRONIC GOUT OF FOOT WITHOUT TOPHUS, UNSPECIFIED LATERALITY: ICD-10-CM

## 2024-01-17 DIAGNOSIS — E78.5 HYPERLIPIDEMIA LDL GOAL <100: ICD-10-CM

## 2024-01-17 DIAGNOSIS — E78.1 HYPERTRIGLYCERIDEMIA: ICD-10-CM

## 2024-01-17 DIAGNOSIS — Z00.00 ROUTINE GENERAL MEDICAL EXAMINATION AT A HEALTH CARE FACILITY: Primary | ICD-10-CM

## 2024-01-17 DIAGNOSIS — Z23 NEED FOR VACCINATION: ICD-10-CM

## 2024-01-17 DIAGNOSIS — D17.30 LIPOMA OF SKIN AND SUBCUTANEOUS TISSUE: ICD-10-CM

## 2024-01-17 DIAGNOSIS — I10 ESSENTIAL HYPERTENSION: ICD-10-CM

## 2024-01-17 DIAGNOSIS — Z71.84 ENCOUNTER FOR COUNSELING FOR TRAVEL: ICD-10-CM

## 2024-01-17 DIAGNOSIS — B35.4 TINEA CORPORIS: ICD-10-CM

## 2024-01-17 DIAGNOSIS — Z12.5 ENCOUNTER FOR PROSTATE CANCER SCREENING: ICD-10-CM

## 2024-01-17 LAB
ERYTHROCYTE [DISTWIDTH] IN BLOOD BY AUTOMATED COUNT: 12.4 % (ref 10–15)
HBA1C MFR BLD: 5.2 % (ref 0–5.6)
HCT VFR BLD AUTO: 47.4 % (ref 40–53)
HGB BLD-MCNC: 16.2 G/DL (ref 13.3–17.7)
MCH RBC QN AUTO: 32.4 PG (ref 26.5–33)
MCHC RBC AUTO-ENTMCNC: 34.2 G/DL (ref 31.5–36.5)
MCV RBC AUTO: 95 FL (ref 78–100)
PLATELET # BLD AUTO: 241 10E3/UL (ref 150–450)
RBC # BLD AUTO: 5 10E6/UL (ref 4.4–5.9)
WBC # BLD AUTO: 7.5 10E3/UL (ref 4–11)

## 2024-01-17 PROCEDURE — 90714 TD VACC NO PRESV 7 YRS+ IM: CPT | Performed by: INTERNAL MEDICINE

## 2024-01-17 PROCEDURE — 85027 COMPLETE CBC AUTOMATED: CPT | Performed by: INTERNAL MEDICINE

## 2024-01-17 PROCEDURE — 36415 COLL VENOUS BLD VENIPUNCTURE: CPT | Performed by: INTERNAL MEDICINE

## 2024-01-17 PROCEDURE — 99215 OFFICE O/P EST HI 40 MIN: CPT | Mod: 25 | Performed by: INTERNAL MEDICINE

## 2024-01-17 PROCEDURE — 80061 LIPID PANEL: CPT | Performed by: INTERNAL MEDICINE

## 2024-01-17 PROCEDURE — 99396 PREV VISIT EST AGE 40-64: CPT | Mod: 25 | Performed by: INTERNAL MEDICINE

## 2024-01-17 PROCEDURE — 80053 COMPREHEN METABOLIC PANEL: CPT | Performed by: INTERNAL MEDICINE

## 2024-01-17 PROCEDURE — 84550 ASSAY OF BLOOD/URIC ACID: CPT | Performed by: INTERNAL MEDICINE

## 2024-01-17 PROCEDURE — 83036 HEMOGLOBIN GLYCOSYLATED A1C: CPT | Performed by: INTERNAL MEDICINE

## 2024-01-17 PROCEDURE — 90471 IMMUNIZATION ADMIN: CPT | Performed by: INTERNAL MEDICINE

## 2024-01-17 PROCEDURE — G0103 PSA SCREENING: HCPCS | Performed by: INTERNAL MEDICINE

## 2024-01-17 RX ORDER — ALLOPURINOL 300 MG/1
1 TABLET ORAL DAILY
Qty: 90 TABLET | Refills: 3 | Status: SHIPPED | OUTPATIENT
Start: 2024-01-17

## 2024-01-17 RX ORDER — CLOTRIMAZOLE 1 %
CREAM (GRAM) TOPICAL 2 TIMES DAILY
Qty: 30 G | Refills: 1 | Status: SHIPPED | OUTPATIENT
Start: 2024-01-17 | End: 2024-01-31

## 2024-01-17 RX ORDER — OLMESARTAN MEDOXOMIL 40 MG/1
40 TABLET ORAL DAILY
Qty: 90 TABLET | Refills: 3 | Status: SHIPPED | OUTPATIENT
Start: 2024-01-17

## 2024-01-17 RX ORDER — AMLODIPINE BESYLATE 10 MG/1
10 TABLET ORAL DAILY
Qty: 90 TABLET | Refills: 3 | Status: SHIPPED | OUTPATIENT
Start: 2024-01-17

## 2024-01-17 ASSESSMENT — PAIN SCALES - GENERAL: PAINLEVEL: NO PAIN (0)

## 2024-01-17 NOTE — PROGRESS NOTES
Preventive Care Visit  [facility] YUMIKO Arreola MD, Internal Medicine  Jan 17, 2024      SUBJECTIVE:   Waylon is a 53 year old, presenting for the following:  Physical        1/17/2024     9:40 AM   Additional Questions   Roomed by Sandra CARRASQUILLO     History of Present Illness       Reason for visit:  Fatty tumor assessment; booster shots (if needed); possible screening tests    He eats 0-1 servings of fruits and vegetables daily.He consumes 1 sweetened beverage(s) daily.He exercises with enough effort to increase his heart rate 10 to 19 minutes per day.  He exercises with enough effort to increase his heart rate 3 or less days per week.   He is taking medications regularly.    Today's PHQ-2 Score:       1/17/2024    10:42 AM   PHQ-2 ( 1999 [employer])   Q1: Little interest or pleasure in doing things 0   Q2: Feeling down, depressed or hopeless 0   PHQ-2 Score 0   Q1: Little interest or pleasure in doing things Not at all   Q2: Feeling down, depressed or hopeless Not at all   PHQ-2 Score 0     Social History     Tobacco Use    Smoking status: Never    Smokeless tobacco: Never   Substance Use Topics    Alcohol use: Yes     Comment: occ.             3/15/2023     9:20 AM   Alcohol Use   Prescreen: >3 drinks/day or >7 drinks/week? No         6/26/2019     1:11 PM   AUDIT - Alcohol Use Disorders Identification Test - Reproduced from the World Health Organization Audit 2001 (Second Edition)   1.  How often do you have a drink containing alcohol? 2 to 3 times a week   2.  How many drinks containing alcohol do you have on a typical day when you are drinking? 1 or 2   3.  How often do you have five or more drinks on one occasion? Never   4.  How often during the last year have you found that you were not able to stop drinking once you had started? Never   5.  How often during the last year have you failed to do what was normally expected of you because of drinking? Never   6.  How often during the last  "year have you needed a first drink in the morning to get yourself going after a heavy drinking session? Never   7.  How often during the last year have you had a feeling of guilt or remorse after drinking? Never   8.  How often during the last year have you been unable to remember what happened the night before because of your drinking? Never   9.  Have you or someone else been injured because of your drinking? No   10. Has a relative, friend, doctor or other health care worker been concerned about your drinking or suggested you cut down? No   TOTAL SCORE 3       Last PSA:   PSA   Date Value Ref Range Status   06/28/2019 0.70 0 - 4 ug/L Final     Comment:     Assay Method:  Chemiluminescence using Siemens Vista analyzer     Prostate Specific Antigen Screen   Date Value Ref Range Status   03/15/2023 0.95 0.00 - 3.50 ng/mL Final       Reviewed orders with patient. Reviewed health maintenance and updated orders accordingly - Yes  Lab work is in process  Labs reviewed in EPIC    Reviewed and updated as needed this visit by clinical staff   Tobacco  Allergies  Meds  Problems  Med Hx  Surg Hx  Fam Hx          Reviewed and updated as needed this visit by Provider   Tobacco  Allergies  Meds  Problems  Med Hx  Surg Hx  Fam Hx          Past Medical History:   Diagnosis Date    Hyperlipidemia     Hypertension       History reviewed. No pertinent surgical history.    OBJECTIVE:   /76 (BP Location: Left arm, Patient Position: Sitting, Cuff Size: Adult Large)   Pulse 96   Temp 97.5  F (36.4  C) (Tympanic)   Resp 20   Ht 1.689 m (5' 6.5\")   Wt 114.7 kg (252 lb 12.8 oz)   SpO2 95%   BMI 40.19 kg/m     Estimated body mass index is 40.19 kg/m  as calculated from the following:    Height as of this encounter: 1.689 m (5' 6.5\").    Weight as of this encounter: 114.7 kg (252 lb 12.8 oz).  Review of Systems    Review of Systems  Constitutional, HEENT, cardiovascular, pulmonary, GI, , musculoskeletal, neuro, " skin, endocrine and psych systems are negative, except as otherwise noted.  Physical Exam  GENERAL: alert and no distress  EYES: Eyes grossly normal to inspection, PERRL and conjunctivae and sclerae normal  HENT: ear canals and TM's normal, nose and mouth without ulcers or lesions  NECK: no adenopathy, no asymmetry, masses, or scars  RESP: lungs clear to auscultation - no rales, rhonchi or wheezes  CV: regular rate and rhythm, normal S1 S2, no S3 or S4, no murmur, click or rub, no peripheral edema  ABDOMEN: soft, nontender, no hepatosplenomegaly, no masses and bowel sounds normal  MS: no gross musculoskeletal defects noted, no edema  SKIN: no suspicious lesions or rashes  NEURO: Normal strength and tone, mentation intact and speech normal  PSYCH: mentation appears normal, affect normal/bright  LYMPH: no cervical, supraclavicular, axillary, or inguinal adenopathy    Diagnostic Test Results:  Labs reviewed in Epic    ASSESSMENT/PLAN:     Assessment and Plan  1. Routine general medical examination at a health care facility  Last seen patient in March 2023 for annual physical, he is needing preoperative clearance for lipoma removal on the back of the trunk.  Does need holding parameters for metformin and aspirin emphasized if patient is going to come for the preoperative clearance on another day but today he is opting this to be annual physical as offered.    Last lab work in 3/2023 with dyslipidemia, with elevated LFTs , Does have alcohol consumption around 3x/week about 2 drinks each time.     Patient had multiple questions which his wife has conveyed to ask the PCP including the cancer screenings for his age group which I went through his current age of 53 years and the needed cancer screening is all up-to-date and also included in today's lab work as below.  Answered all the questions, patient still wants to make sure if there is any additional screening tests for the cancer which he does not have any known family  history questioning today.  Offered him genetic counseling if needed on the family history of any significant cancers which patient will update us on this plan.  Answered all the questions    - TD,PF 7+(TENIVAC)  - Comprehensive metabolic panel (BMP + Alb, Alk Phos, ALT, AST, Total. Bili, TP); Future  - CBC with platelets; Future  - Lipid panel reflex to direct LDL Fasting; Future  - Hemoglobin A1c; Future  - PSA, screen; Future  - Comprehensive metabolic panel (BMP + Alb, Alk Phos, ALT, AST, Total. Bili, TP)  - CBC with platelets  - Lipid panel reflex to direct LDL Fasting  - Hemoglobin A1c  - PSA, screen    2. Essential hypertension  Chronic stable, continue current medications.  - Comprehensive metabolic panel (BMP + Alb, Alk Phos, ALT, AST, Total. Bili, TP); Future  - Comprehensive metabolic panel (BMP + Alb, Alk Phos, ALT, AST, Total. Bili, TP)  - olmesartan (BENICAR) 40 MG tablet; Take 1 tablet (40 mg) by mouth daily  Dispense: 90 tablet; Refill: 3  - amLODIPine (NORVASC) 10 MG tablet; Take 1 tablet (10 mg) by mouth daily  Dispense: 90 tablet; Refill: 3    3. Lipoma of skin and subcutaneous tissue  Ongoing problem, patient does have upcoming appointment for surgery consult who is going to evaluate and decide on the date of surgery.  He states that they are going to take care of the preoperative clearance as he endorses but emphasized to reach his back for preoperative clearance visit if needed for this.  Patient understood and agreed to plan.    4. Encounter for counseling for travel  New concern, patient is traveling to [place] and requesting for the travel needs.  Will place referral to travel clinic for further recommendations.  - Travel Clinic Referral; Future    5. Idiopathic chronic gout of foot without tophus, unspecified laterality  Chronic stable, patient does take allopurinol 300 mg daily regularly, denies any acute flareups in the past 2 years.  Does continues to consume alcohol as he endorses around  3x/week about 2 drinks each time.  Given the ongoing triggering factors of possible flareup, patient has been discussing on possible need of being on allopurinol if he does not need to take it.  Shared decision to continue the same dose at this time with yearly follow-up on renal function till he quit alcohol completely before we wean down this to a lower dosage.  Patient understood agree with plan.  - CBC with platelets; Future  - Uric acid; Future  - CBC with platelets  - Uric acid  - allopurinol (ZYLOPRIM) 300 MG tablet; Take 1 tablet (300 mg) by mouth daily  Dispense: 90 tablet; Refill: 3    6. Prediabetes  Chronic stable, continue current metformin as prescribed which is also helping with weight loss given his morbid obesity.  - Hemoglobin A1c; Future  - Hemoglobin A1c  - metFORMIN (GLUCOPHAGE) 500 MG tablet; TAKE 2 TABLETS BY MOUTH 2 TIMES DAILY WITH MEALS Strength: 500 mg  Dispense: 360 tablet; Refill: 3    7. Hyperlipidemia LDL goal <100  - Lipid panel reflex to direct LDL Fasting; Future  - Lipid panel reflex to direct LDL Fasting    8. Tinea corporis  New problem, circular ringworm rash diagnosed on the physical exam positive for oval-shaped tenia corporis rash on the skin below the right breast line.  Will give clotrimazole for improvement.  Patient understood agree with the plan.  - clotrimazole (LOTRIMIN) 1 % external cream; Apply topically 2 times daily for 14 days  Dispense: 30 g; Refill: 1    9. Encounter for prostate cancer screening  - PSA, screen; Future  - PSA, screen    10. Need for vaccination  - TD,PF 7+(TENIVAC)     11. Hypertriglyceridemia  Ongoing problem, Uncontrolled. Will start on Fish oil capsules for improvement of HDL and Triglycerides. Fortunately LDL at goal for no acute needs of Statin at this time.  - Omega-3 Fish Oil 500 MG capsule; Take 1 capsule (500 mg) by mouth daily  Dispense: 90 capsule; Refill: 1      Over 40 minutes spent outside the preventive visit ( 20 minutes ) on  reviewing patient chart,  face to face encounter, greater than 50% time spent with plan/cordination of care and documentation as above in my A/P.           Please note that this note consists of symbols derived from keyboarding, dictation and/or voice recognition software. As a result, there may be errors in the script that have gone undetected. Please consider this when interpreting information found in this chart.    Patient Instructions   As discussed , please do fasting labs placed.     Refills will be taken care.     Placed referral to travel clinic for your travel plans to [place].     ==============================    Preventive Health Recommendations  Male Ages 50 - 64    Yearly exam:             See your health care provider every year in order to  o   Review health changes.   o   Discuss preventive care.    o   Review your medicines if your doctor has prescribed any.   Have a cholesterol test every 5 years, or more frequently if you are at risk for high cholesterol/heart disease.   Have a diabetes test (fasting glucose) every three years. If you are at risk for diabetes, you should have this test more often.   Have a colonoscopy at age 45, or have a yearly FIT test (stool test). These exams will check for colon cancer.    Talk with your health care provider about whether or not a prostate cancer screening test (PSA) is right for you.  You should be tested each year for STDs (sexually transmitted diseases), if you re at risk.     Shots: Get a flu shot each year. Get a tetanus shot every 10 years.     Nutrition:  Eat at least 5 servings of fruits and vegetables daily.   Eat whole-grain bread, whole-wheat pasta and brown rice instead of white grains and rice.   Get adequate Calcium and Vitamin D.     Lifestyle  Exercise for at least 150 minutes a week (30 minutes a day, 5 days a week). This will help you control your weight and prevent disease.   Limit alcohol to one drink per day.   No smoking.   Wear sunscreen  "to prevent skin cancer.   See your dentist every six months for an exam and cleaning.   See your eye doctor every 1 to 2 years.    Return in about 4 weeks (around 2/14/2024), or if symptoms worsen or fail to improve, for Follow up of last visit, - Call our RN line for Preop clearance appointment request. .    Lea Arreola MD  [facility]        Patient has been advised of split billing requirements and indicates understanding: Yes      Counseling  Reviewed preventive health counseling, as reflected in patient instructions  Special attention given to:        Regular exercise       Healthy diet/nutrition       Vision screening       Hearing screening       Immunizations  Vaccinated for: Td           Colorectal cancer screening       Prostate cancer screening      BMI  Estimated body mass index is 40.19 kg/m  as calculated from the following:    Height as of this encounter: 1.689 m (5' 6.5\").    Weight as of this encounter: 114.7 kg (252 lb 12.8 oz).   Weight management plan: Discussed healthy diet and exercise guidelines      He reports that he has never smoked. He has never used smokeless tobacco.            Signed Electronically by: Lea Arreola MD  "

## 2024-01-17 NOTE — PATIENT INSTRUCTIONS
As discussed , please do fasting labs placed.     Refills will be taken care.     Placed referral to travel clinic for your travel plans to [place].     ==============================    Preventive Health Recommendations  Male Ages 50 - 64    Yearly exam:             See your health care provider every year in order to  o   Review health changes.   o   Discuss preventive care.    o   Review your medicines if your doctor has prescribed any.   Have a cholesterol test every 5 years, or more frequently if you are at risk for high cholesterol/heart disease.   Have a diabetes test (fasting glucose) every three years. If you are at risk for diabetes, you should have this test more often.   Have a colonoscopy at age 45, or have a yearly FIT test (stool test). These exams will check for colon cancer.    Talk with your health care provider about whether or not a prostate cancer screening test (PSA) is right for you.  You should be tested each year for STDs (sexually transmitted diseases), if you re at risk.     Shots: Get a flu shot each year. Get a tetanus shot every 10 years.     Nutrition:  Eat at least 5 servings of fruits and vegetables daily.   Eat whole-grain bread, whole-wheat pasta and brown rice instead of white grains and rice.   Get adequate Calcium and Vitamin D.     Lifestyle  Exercise for at least 150 minutes a week (30 minutes a day, 5 days a week). This will help you control your weight and prevent disease.   Limit alcohol to one drink per day.   No smoking.   Wear sunscreen to prevent skin cancer.   See your dentist every six months for an exam and cleaning.   See your eye doctor every 1 to 2 years.

## 2024-01-18 LAB
ALBUMIN SERPL BCG-MCNC: 4.6 G/DL (ref 3.5–5.2)
ALP SERPL-CCNC: 122 U/L (ref 40–150)
ALT SERPL W P-5'-P-CCNC: 50 U/L (ref 0–70)
ANION GAP SERPL CALCULATED.3IONS-SCNC: 11 MMOL/L (ref 7–15)
AST SERPL W P-5'-P-CCNC: 29 U/L (ref 0–45)
BILIRUB SERPL-MCNC: 0.3 MG/DL
BUN SERPL-MCNC: 17.1 MG/DL (ref 6–20)
CALCIUM SERPL-MCNC: 9.5 MG/DL (ref 8.6–10)
CHLORIDE SERPL-SCNC: 103 MMOL/L (ref 98–107)
CHOLEST SERPL-MCNC: 154 MG/DL
CREAT SERPL-MCNC: 0.88 MG/DL (ref 0.67–1.17)
DEPRECATED HCO3 PLAS-SCNC: 26 MMOL/L (ref 22–29)
EGFRCR SERPLBLD CKD-EPI 2021: >90 ML/MIN/1.73M2
FASTING STATUS PATIENT QL REPORTED: YES
GLUCOSE SERPL-MCNC: 93 MG/DL (ref 70–99)
HDLC SERPL-MCNC: 22 MG/DL
LDLC SERPL CALC-MCNC: 91 MG/DL
NONHDLC SERPL-MCNC: 132 MG/DL
POTASSIUM SERPL-SCNC: 4.3 MMOL/L (ref 3.4–5.3)
PROT SERPL-MCNC: 7.1 G/DL (ref 6.4–8.3)
PSA SERPL DL<=0.01 NG/ML-MCNC: 2.58 NG/ML (ref 0–3.5)
SODIUM SERPL-SCNC: 140 MMOL/L (ref 135–145)
TRIGL SERPL-MCNC: 207 MG/DL
URATE SERPL-MCNC: 5 MG/DL (ref 3.4–7)

## 2024-01-19 RX ORDER — METAPROTERENOL SULFATE 10 MG
500 TABLET ORAL DAILY
Qty: 90 CAPSULE | Refills: 1 | Status: SHIPPED | OUTPATIENT
Start: 2024-01-19

## 2024-01-30 ENCOUNTER — OFFICE VISIT (OUTPATIENT)
Dept: FAMILY MEDICINE | Facility: CLINIC | Age: 54
End: 2024-01-30
Payer: COMMERCIAL

## 2024-01-30 DIAGNOSIS — L82.1 SEBORRHEIC KERATOSIS: ICD-10-CM

## 2024-01-30 DIAGNOSIS — D17.30 LIPOMA OF SKIN AND SUBCUTANEOUS TISSUE: ICD-10-CM

## 2024-01-30 DIAGNOSIS — D22.9 MULTIPLE BENIGN NEVI: Primary | ICD-10-CM

## 2024-01-30 DIAGNOSIS — L73.8 SEBACEOUS HYPERPLASIA OF FACE: ICD-10-CM

## 2024-01-30 DIAGNOSIS — L81.4 LENTIGINES: ICD-10-CM

## 2024-01-30 DIAGNOSIS — D18.01 CHERRY ANGIOMA: ICD-10-CM

## 2024-01-30 PROCEDURE — 99203 OFFICE O/P NEW LOW 30 MIN: CPT | Performed by: DERMATOLOGY

## 2024-01-30 ASSESSMENT — PAIN SCALES - GENERAL: PAINLEVEL: NO PAIN (0)

## 2024-01-30 NOTE — LETTER
1/30/2024         RE: Waylon Santiago  [id number] [facility] [id number]        Dear Colleague,    Thank you for referring your patient, Waylon Santiago, to the [facility] [place]. Please see a copy of my visit note below.    [facility] Note  Encounter Date: Jan 30, 2024  Office Visit     Dermatology Problem List:  1. Sebaceous hyperplasia  2. Lipoma, left upper back,  - *Pending excision with surgery  3. Cyst, left upper abdomen    # SHx: Recently took a trip to [place]  ____________________________________________    Assessment & Plan:    # Sebaceous hyperplasia, forehead  - Discussed trying a retinoid, he will consider.  - Consider cosmetic derm referral.    # Lipoma, left upper back  - Patient is planning for excision with surgery    # Cyst, left upper abdomen  - Cyst management options reviewed.   - The patient will consider an excision.    # Benign lesions - SKs, cherry angiomas, lentigenes.  - No treatment required    Procedures Performed:   None.    Follow-up: prn for new or changing lesions    Staff and Scribe:     Scribe Disclosure:   I, JUAN WARNER, am serving as a scribe; to document services personally performed by Keyona Fernandez MD -based on data collection and the provider's statements to me.    Provider Disclosure:   The documentation recorded by the scribe accurately reflects the services I personally performed and the decisions made by me.    Keyona Fernandez MD    Department of Dermatology  [facility]: Phone: [phone], Fax: [phone]  1/31/2024     ____________________________________________    CC: Derm Problem (Referred by FP to check skin lesions on face and upper extremities)    HPI:  Mr. Waylon Santiago is a(n) 53 year old male who presents today as a new patient for lesion of concern.    The patient presents  "with skin lesions on face an upper extremities.     He mentions he has previously had \"brown spots\" zapped. He did have one spot that kept flaking off, though it did go away at some point. He also reports of another spot that is rough. Denies itching, bleeding, and growing.    Patient also presents with a cyst.    He recently took a trip to [facility].    Patient is otherwise feeling well, without additional skin concerns.    Labs Reviewed:  N/A    Physical Exam:  Vitals: There were no vitals taken for this visit.  SKIN: UBSE  - Left upper back, large subcutaneous mass  - There is a raised dome shaped nodule with a central punctum located on left upper abdomen.   - There are dome shaped bright red papules on the trunk and extremities .   - Multiple regular brown pigmented macules and papules are identified on the trunk and extremities. .   - Scattered brown macules on sun exposed areas.  - Waxy stuck on papules and plaques on trunk and extremities.   - There are yellow oily papules with central umbilication located on the forehead.  - No other lesions of concern on areas examined.     Medications:  Current Outpatient Medications   Medication     allopurinol (ZYLOPRIM) 300 MG tablet     amLODIPine (NORVASC) 10 MG tablet     aspirin 81 MG tablet     clotrimazole (LOTRIMIN) 1 % external cream     metFORMIN (GLUCOPHAGE) 500 MG tablet     olmesartan (BENICAR) 40 MG tablet     Omega-3 Fish Oil 500 MG capsule     No current facility-administered medications for this visit.      Past Medical History:   Patient Active Problem List   Diagnosis     Essential hypertension     Hyperlipidemia LDL goal <100     Abnormal results of liver function studies     Gout     Preventive measure     Morbid obesity (H)     Lipoma of skin and subcutaneous tissue     Adhesive capsulitis of left shoulder     Lumbar radiculopathy     Past Medical History:   Diagnosis Date     Hyperlipidemia      Hypertension         CC Lea Arreola MD  [address] [place] [address] on close of this encounter.      Again, thank you for allowing me to participate in the care of your patient.        Sincerely,        Keyona Fernandez MD

## 2024-01-30 NOTE — PATIENT INSTRUCTIONS
Could consider tretinoin to try to reduce/prevent sebaceous hyperplasia          Checking for Skin Cancer  You can help find cancer early by checking your skin each month. There are 3 main kinds of skin cancer: melanoma, basal cell carcinoma, and squamous cell carcinoma. Doing monthly skin checks is the best way to find new marks, sores, or skin changes. Follow these instructions for checking your skin.   The ABCDEs of checking moles for melanoma   Check your moles or growths for signs of melanoma using ABCDE:   Asymmetry: The sides of the mole or growth don t match.  Border: The edges are ragged, notched, or blurred.  Color: The color within the mole or growth varies. It could be black, brown, tan, white, or shades of red, gray, or blue.  Diameter: The mole or growth is larger than   inch or 6 mm (size of a pencil eraser).  Evolving: The size, shape, texture, or color of the mole or growth is changing.     ABCDE's of moles on light skin.        ABCDE's of moles on dark skin may be harder to identify.     Checking for other types of skin cancer  Basal cell carcinoma or squamous cell carcinoma cause symptoms like:     A spot or mole that looks different from all other marks on your skin  Changes in how an area feels, such as itching, tenderness, or pain  Changes in the skin's surface, such as oozing, bleeding, or scaliness  A sore that doesn't heal  New swelling, redness, or spread of color beyond the border of a mole    Who s at risk?  Anyone of any skin color can get skin cancer. But you're at greater risk if you have:   Fair skin that freckles easily and burns instead of tanning  Light-colored or red hair  Light-colored eyes  Many moles or abnormal moles on your skin  A long history of unprotected exposure to sunlight or tanning beds  A history of many blistering sunburns as a child or teen  A family history of skin cancer  Been exposed to radiation or chemicals  A weakened immune system  Been exposed to  arsenic  If you've had skin cancer in the past, you're at high risk of having it again.   How to check your skin  Do your monthly skin checkups in front of a full-length mirror. Use a room with good lighting so it's easier to see. Use a hand mirror to look at hard-to-see places like your buttocks and back. You can also have a trusted friend or family member help you with these checks. Check every part of your body, including your:   Head (ears, face, neck, and scalp)  Torso (front, back, sides, and under breasts)  Arms (tops, undersides, and armpits)  Hands (palms, backs, and fingers, including under the nails)  Lower back, buttocks, and genitals  Legs (front, back, and sides)  Feet (tops, soles, toes, including under the nails, and between toes)  Watch for new spots on your skin or a spot that's changing in color, shape, size.   If you have a lot of moles, take digital photos of them each month. Make sure to take photos both up close and from a distance. These can help you see if any moles change over time.   Know your skin  Most skin changes aren't cancer. But if you see any changes in your skin, call your healthcare provider right away. Only they can tell you if a change is a problem. If you have skin cancer, seeing your provider can be the first step to getting the treatment that could save your life.   [employer] last reviewed this educational content on 10/1/2021    8222-7465 [employer]. All rights reserved. This information is not intended as a substitute for professional medical care. Always follow your healthcare professional's instructions.

## 2024-01-30 NOTE — PROGRESS NOTES
"[facility] Dermatology Note  Encounter Date: Jan 30, 2024  Office Visit     Dermatology Problem List:  1. Sebaceous hyperplasia  2. Lipoma, left upper back,  - *Pending excision with surgery  3. Cyst, left upper abdomen    # SHx: Recently took a trip to [place]  ____________________________________________    Assessment & Plan:    # Sebaceous hyperplasia, forehead  - Discussed trying a retinoid, he will consider.  - Consider cosmetic derm referral.    # Lipoma, left upper back  - Patient is planning for excision with surgery    # Cyst, left upper abdomen  - Cyst management options reviewed.   - The patient will consider an excision.    # Benign lesions - SKs, cherry angiomas, lentigenes.  - No treatment required    Procedures Performed:   None.    Follow-up: prn for new or changing lesions    Staff and Scribe:     Scribe Disclosure:   I, JUAN WARNER, am serving as a scribe; to document services personally performed by Keyona Fernandez MD -based on data collection and the provider's statements to me.    Provider Disclosure:   The documentation recorded by the scribe accurately reflects the services I personally performed and the decisions made by me.    Keyona Fernandez MD    Department of Dermatology  [facility]: Phone: [phone], Fax: [phone]  1/31/2024     ____________________________________________    CC: Derm Problem (Referred by FP to check skin lesions on face and upper extremities)    HPI:  Mr. Waylon Santiago is a(n) 53 year old male who presents today as a new patient for lesion of concern.    The patient presents with skin lesions on face an upper extremities.     He mentions he has previously had \"brown spots\" zapped. He did have one spot that kept flaking off, though it did go away at some point. He also reports of another spot that is rough. Denies itching, bleeding, " and growing.    Patient also presents with a cyst.    He recently took a trip to [facility].    Patient is otherwise feeling well, without additional skin concerns.    Labs Reviewed:  N/A    Physical Exam:  Vitals: There were no vitals taken for this visit.  SKIN: UBSE  - Left upper back, large subcutaneous mass  - There is a raised dome shaped nodule with a central punctum located on left upper abdomen.   - There are dome shaped bright red papules on the trunk and extremities .   - Multiple regular brown pigmented macules and papules are identified on the trunk and extremities. .   - Scattered brown macules on sun exposed areas.  - Waxy stuck on papules and plaques on trunk and extremities.   - There are yellow oily papules with central umbilication located on the forehead.  - No other lesions of concern on areas examined.     Medications:  Current Outpatient Medications   Medication    allopurinol (ZYLOPRIM) 300 MG tablet    amLODIPine (NORVASC) 10 MG tablet    aspirin 81 MG tablet    clotrimazole (LOTRIMIN) 1 % external cream    metFORMIN (GLUCOPHAGE) 500 MG tablet    olmesartan (BENICAR) 40 MG tablet    Omega-3 Fish Oil 500 MG capsule     No current facility-administered medications for this visit.      Past Medical History:   Patient Active Problem List   Diagnosis    Essential hypertension    Hyperlipidemia LDL goal <100    Abnormal results of liver function studies    Gout    Preventive measure    Morbid obesity (H)    Lipoma of skin and subcutaneous tissue    Adhesive capsulitis of left shoulder    Lumbar radiculopathy     Past Medical History:   Diagnosis Date    Hyperlipidemia     Hypertension         CC Lea Arreola MD  [address] [place] [address] on close of this encounter.

## 2024-02-16 ENCOUNTER — PRE VISIT (OUTPATIENT)
Dept: SURGERY | Facility: CLINIC | Age: 54
End: 2024-02-16

## 2024-02-16 ENCOUNTER — OFFICE VISIT (OUTPATIENT)
Dept: SURGERY | Facility: CLINIC | Age: 54
End: 2024-02-16
Attending: INTERNAL MEDICINE
Payer: COMMERCIAL

## 2024-02-16 VITALS
DIASTOLIC BLOOD PRESSURE: 77 MMHG | OXYGEN SATURATION: 97 % | HEIGHT: 67 IN | HEART RATE: 107 BPM | SYSTOLIC BLOOD PRESSURE: 123 MMHG | BODY MASS INDEX: 40.73 KG/M2 | WEIGHT: 259.5 LBS

## 2024-02-16 DIAGNOSIS — D17.30 LIPOMA OF SKIN AND SUBCUTANEOUS TISSUE: Primary | ICD-10-CM

## 2024-02-16 PROCEDURE — 99204 OFFICE O/P NEW MOD 45 MIN: CPT | Performed by: SURGERY

## 2024-02-16 RX ORDER — CEFAZOLIN SODIUM 2 G/50ML
2 SOLUTION INTRAVENOUS
Status: CANCELLED | OUTPATIENT
Start: 2024-02-16

## 2024-02-16 RX ORDER — CEFAZOLIN SODIUM 2 G/50ML
2 SOLUTION INTRAVENOUS SEE ADMIN INSTRUCTIONS
Status: CANCELLED | OUTPATIENT
Start: 2024-02-16

## 2024-02-16 ASSESSMENT — PAIN SCALES - GENERAL: PAINLEVEL: NO PAIN (0)

## 2024-02-16 NOTE — NURSING NOTE
Pre and Post op Patient Education/Teaching Flowsheet  Relevant Diagnosis:  Lipoma (D17.30)  Teaching Topic:  Pre and post op teaching  Person(s) Involved in teaching:  Patient     Motivation Level:  Asks Questions:  Yes  Eager to Learn:  Yes  Cooperative:  Yes  Receptive (willing/able to accept information):  Yes  Any cultural factors/Spiritism beliefs that may influence understanding or compliance?  No    Patient/caregiver/family demonstrates understanding of the following:  Reason for the appointment, diagnosis, and treatment plan:  Yes  Patient demonstrates understanding of the following:  Pre-op bowel prep:  No  Post-op pain management recommendations (medications, ice compress, binder/athletic supporter (if applicable), etc.:  Yes  Inguinal hernia patients:  Post-op urinary retention- discussed signs/symptoms and visit to ER for Thapa catheter placement and to stay in place for at least 48 hours:  NA  Restrictions:  NA  Medications to take the day of surgery:  Per PCP  Blood thinner medications discussed and when to stop (if applicable):  Yes  Wound care:  Yes  Diabetes medication management (if applicable):  Per PCP  Which situations necessitate calling provider and whom to contact:  Discussed how to contact the hospital, nurse, and clinic scheduling staff if necessary      Date and time of surgery:  TBD  Location of surgery: [facility] [place]- 5th Floor  History and Physical and any other testing necessary prior to surgery:  Yes  Required time line for completion of History and Physical and any pre-op testing:  Yes  Discuss need for someone to drive patient home and stay with them for 24 hours:  Yes  Pre-op showering/scrub information with Surgical Scrub:  Yes  NPO Guidelines:  NPO per Anesthesia Guidelines    Infection Prevention: Patient demonstrates understanding of the following:  Patient instructed on hand hygiene:  Yes  Surgical procedure site care will be taught and will be  reviewed at the time of discharge  Signs and symptoms of infection taught:  Yes  Wound care reviewed and will be taught at the time of discharge  Central venous catheter care will be taught at the time of discharge (if applicable)    Post-op follow-up:  Instructional materials used/given/mailed:  Surgical logistics, post op teaching sheet, and surgical scrub

## 2024-02-16 NOTE — NURSING NOTE
"Chief Complaint   Patient presents with    New Patient     Lipoma of skin and subcutaneous tissue       Vitals:    02/16/24 0931   BP: 123/77   BP Location: Left arm   Patient Position: Sitting   Cuff Size: Adult Large   Pulse: 107   SpO2: 97%   Weight: 117.7 kg (259 lb 8 oz)   Height: 1.689 m (5' 6.5\")       Body mass index is 41.26 kg/m .                          Boris Tejada, EMT    "

## 2024-02-16 NOTE — PROGRESS NOTES
General Surgery Clinic Note - New Patient Visit    NAME: Waylon Santiago,  53 year old male    PCP: Lea Arreola  MRN:   [id number]      #: [phone]  Date: Feb 16, 2024    Chief Complaint:     History of Present Illness: Waylon Santiago is a 53 year old male who presents to the clinic with a large posterior back mass that has progressively enlarged over the last 6 years.  The size is now interfering with his ability to lie flat comfortably and he would like it removed. It is nontender but uncomfortable.    Past Medical History: History in Epic reviewed with the patient:  Past Medical History:   Diagnosis Date    Hyperlipidemia     Hypertension        Past Surgical History: History in Epic reviewed with the patient.      Family History: History in Epic reviewed with the patient:  Family History   Problem Relation Age of Onset    Diabetes Mother     Family History Negative Father         health hx unknown; smoker       Social History: History in Epic reviewed with the patient:  Social History     Socioeconomic History    Marital status:      Spouse name: Not on file    Number of children: Not on file    Years of education: Not on file    Highest education level: Not on file   Occupational History    Not on file   Tobacco Use    Smoking status: Never    Smokeless tobacco: Never   Vaping Use    Vaping Use: Unknown   Substance and Sexual Activity    Alcohol use: Yes     Comment: occ.    Drug use: No    Sexual activity: Yes     Partners: Female   Other Topics Concern    Parent/sibling w/ CABG, MI or angioplasty before 65F 55M? No   Social History Narrative    Not on file     Social Determinants of Health     Financial Resource Strain: Low Risk  (1/17/2024)    Financial Resource Strain     Within the past 12 months, have you or your family members you live with been unable to get utilities (heat, electricity) when it was really needed?: No   Food Insecurity: Low Risk  (1/17/2024)    Food Insecurity      Within the past 12 months, did you worry that your food would run out before you got money to buy more?: No     Within the past 12 months, did the food you bought just not last and you didn t have money to get more?: No   Transportation Needs: Low Risk  (1/17/2024)    Transportation Needs     Within the past 12 months, has lack of transportation kept you from medical appointments, getting your medicines, non-medical meetings or appointments, work, or from getting things that you need?: No   Physical Activity: Not on file   Stress: Not on file   Social Connections: Not on file   Interpersonal Safety: Low Risk  (1/17/2024)    Interpersonal Safety     Do you feel physically and emotionally safe where you currently live?: Yes     Within the past 12 months, have you been hit, slapped, kicked or otherwise physically hurt by someone?: No     Within the past 12 months, have you been humiliated or emotionally abused in other ways by your partner or ex-partner?: No   Housing Stability: Low Risk  (1/17/2024)    Housing Stability     Do you have housing? : Yes     Are you worried about losing your housing?: No       Allergies:   No Known Allergies    Outpatient Medications:  Outpatient Encounter Medications as of 2/16/2024   Medication Sig Dispense Refill    allopurinol (ZYLOPRIM) 300 MG tablet Take 1 tablet (300 mg) by mouth daily 90 tablet 3    amLODIPine (NORVASC) 10 MG tablet Take 1 tablet (10 mg) by mouth daily 90 tablet 3    aspirin 81 MG tablet Take 1 tablet (81 mg) by mouth daily 30 tablet 11    metFORMIN (GLUCOPHAGE) 500 MG tablet TAKE 2 TABLETS BY MOUTH 2 TIMES DAILY WITH MEALS Strength: 500 mg 360 tablet 3    olmesartan (BENICAR) 40 MG tablet Take 1 tablet (40 mg) by mouth daily 90 tablet 3    Omega-3 Fish Oil 500 MG capsule Take 1 capsule (500 mg) by mouth daily 90 capsule 1     No facility-administered encounter medications on file as of 2/16/2024.         ROS: 10 systems reviewed and all are negative except as  "above.    EXAM:  /77 (BP Location: Left arm, Patient Position: Sitting, Cuff Size: Adult Large)   Pulse 107   Ht 1.689 m (5' 6.5\")   Wt 117.7 kg (259 lb 8 oz)   SpO2 97%   BMI 41.26 kg/m    Psych: Normal affect  Neuro: No gross focal deficits noted  Head:Normocephalic, atraumatic  Eyes: Non icteric  Neck:supple, 6 cm soft mobile mass at base of neck and extending toward left shoulder.  Heart: Regular rate and rhythm  Lungs: non-labored, quiet respiration  Abdomen: soft  Extremities: No obvious deformities    Imaging Data (I have personally reviewed the following reports):  No results found for this or any previous visit (from the past 744 hour(s)).    A/P: Waylon Santiago is a 53 year old male with upper back mass, likely lipoma.    -risks/benefits were discussed including risk of bleeding and infection after the procedure and the benign nature of this condition were emphasized    - he would like to move forward with excision    -d/t to the size of the mass this will be done with LMA and local anesthetic at the site    -he will wait to schedule after his trip out of the country in early April      Jeremie Dunn MD     "

## 2024-02-16 NOTE — LETTER
2/16/2024       RE: Waylon Santiago  [address] [address]     Dear Colleague,    Thank you for referring your patient, Waylon Santiago, to the [facility]AL SURGERY [facility] [place] at [facility]. Please see a copy of my visit note below.    General Surgery Clinic Note - New Patient Visit    NAME: Waylon Santiago,  53 year old male    PCP: Lea Arreola  MRN:   [id number]      Ph#: [phone]  Date: Feb 16, 2024    Chief Complaint:     History of Present Illness: Waylon Santiago is a 53 year old male who presents to the clinic with a large posterior back mass that has progressively enlarged over the last 6 years.  The size is now interfering with his ability to lie flat comfortably and he would like it removed. It is nontender but uncomfortable.    Past Medical History: History in Epic reviewed with the patient:  Past Medical History:   Diagnosis Date     Hyperlipidemia      Hypertension        Past Surgical History: History in Epic reviewed with the patient.      Family History: History in Epic reviewed with the patient:  Family History   Problem Relation Age of Onset     Diabetes Mother      Family History Negative Father         health hx unknown; smoker       Social History: History in Epic reviewed with the patient:  Social History     Socioeconomic History     Marital status:      Spouse name: Not on file     Number of children: Not on file     Years of education: Not on file     Highest education level: Not on file   Occupational History     Not on file   Tobacco Use     Smoking status: Never     Smokeless tobacco: Never   Vaping Use     Vaping Use: Unknown   Substance and Sexual Activity     Alcohol use: Yes     Comment: occ.     Drug use: No     Sexual activity: Yes     Partners: Female   Other Topics Concern     Parent/sibling w/ CABG, MI or angioplasty before 65F 55M? No   Social History Narrative      Not on file     Social Determinants of Health     Financial Resource Strain: Low Risk  (1/17/2024)    Financial Resource Strain      Within the past 12 months, have you or your family members you live with been unable to get utilities (heat, electricity) when it was really needed?: No   Food Insecurity: Low Risk  (1/17/2024)    Food Insecurity      Within the past 12 months, did you worry that your food would run out before you got money to buy more?: No      Within the past 12 months, did the food you bought just not last and you didn t have money to get more?: No   Transportation Needs: Low Risk  (1/17/2024)    Transportation Needs      Within the past 12 months, has lack of transportation kept you from medical appointments, getting your medicines, non-medical meetings or appointments, work, or from getting things that you need?: No   Physical Activity: Not on file   Stress: Not on file   Social Connections: Not on file   Interpersonal Safety: Low Risk  (1/17/2024)    Interpersonal Safety      Do you feel physically and emotionally safe where you currently live?: Yes      Within the past 12 months, have you been hit, slapped, kicked or otherwise physically hurt by someone?: No      Within the past 12 months, have you been humiliated or emotionally abused in other ways by your partner or ex-partner?: No   Housing Stability: Low Risk  (1/17/2024)    Housing Stability      Do you have housing? : Yes      Are you worried about losing your housing?: No       Allergies:   No Known Allergies    Outpatient Medications:  Outpatient Encounter Medications as of 2/16/2024   Medication Sig Dispense Refill     allopurinol (ZYLOPRIM) 300 MG tablet Take 1 tablet (300 mg) by mouth daily 90 tablet 3     amLODIPine (NORVASC) 10 MG tablet Take 1 tablet (10 mg) by mouth daily 90 tablet 3     aspirin 81 MG tablet Take 1 tablet (81 mg) by mouth daily 30 tablet 11     metFORMIN (GLUCOPHAGE) 500 MG tablet TAKE 2 TABLETS BY MOUTH 2 TIMES  "DAILY WITH MEALS Strength: 500 mg 360 tablet 3     olmesartan (BENICAR) 40 MG tablet Take 1 tablet (40 mg) by mouth daily 90 tablet 3     Omega-3 Fish Oil 500 MG capsule Take 1 capsule (500 mg) by mouth daily 90 capsule 1     No facility-administered encounter medications on file as of 2/16/2024.         ROS: 10 systems reviewed and all are negative except as above.    EXAM:  /77 (BP Location: Left arm, Patient Position: Sitting, Cuff Size: Adult Large)   Pulse 107   Ht 1.689 m (5' 6.5\")   Wt 117.7 kg (259 lb 8 oz)   SpO2 97%   BMI 41.26 kg/m    Psych: Normal affect  Neuro: No gross focal deficits noted  Head:Normocephalic, atraumatic  Eyes: Non icteric  Neck:supple, 6 cm soft mobile mass at base of neck and extending toward left shoulder.  Heart: Regular rate and rhythm  Lungs: non-labored, quiet respiration  Abdomen: soft  Extremities: No obvious deformities    Imaging Data (I have personally reviewed the following reports):  No results found for this or any previous visit (from the past 744 hour(s)).    A/P: Waylon Santiago is a 53 year old male with upper back mass, likely lipoma.    -risks/benefits were discussed including risk of bleeding and infection after the procedure and the benign nature of this condition were emphasized    - he would like to move forward with excision    -d/t to the size of the mass this will be done with LMA and local anesthetic at the site    -he will wait to schedule after his trip out of the country in early April      Jeremie Dunn MD         Again, thank you for allowing me to participate in the care of your patient.      Sincerely,    Jeremie Dunn MD    "

## 2024-02-16 NOTE — PATIENT INSTRUCTIONS
You met with Dr. Jeremie Dunn.      Today's visit instructions:    Reminder:  Surgery Requirements  Your surgery will be at [facility] [place]- 5th Floor.  You will need to arrive 1.5  hours early.  You will need someone to drive you home (over 18 years old) and stay with you for 24 hours after the procedure.  You will need a preop physical with your regular doctor within 30 days of surgery- closer is always better.  Stop any blood thinners, vitamins, minerals, or herbal supplements 5 days before surgery.  If you are taking a prescribed blood thinner or weight loss medication please let us know for specific instructions.  Fasting- a nurse from [facility] will call you 1-2 days before surgery to confirm your procedure and tell you when to stop eating and drinking.   Wash with the soap (Antibacterial Dial Foaming Complete , Hibiclense, or soap given/mailed from the clinic) the night before surgery and morning of surgery. See instructions in the Surgery Packet.  If you would like a procedure estimate please call Cost of Care at [phone] during the hours of 8 AM - 3 PM (option #2 for on-line request and option #3 for a representative).     If you have questions please contact Jerilyn RN or Yoselyn RN during regular clinic hours, Monday through Friday 7:30 AM - 4:00 PM, or you can contact us via [facility] at anytime.       If you have urgent needs after-hours, weekends, or holidays please call the hospital at [phone] and ask to speak with our on-call General Surgery Team.    Appointment [phone]  Nurse Advice (Jerilyn or Yoselyn): [phone]   Surgery Scheduler (Santa): [phone]  Fax: [phone]    Mass/Lump Removal       Before the procedure:  Do not shave the area where the lump/mass is located.     Incision care   You may take a shower the day after surgery. Carefully wash your incision with soap and water. Do not submerge yourself in water (bath, whirlpool,  hot tub, pool, lake) for 14 days after surgery.   Remove the gauze bandage 1-2 days after surgery but leave the medical tape (Steri-Strips) or glue in place. These will loosen and fall off on their own 1-2 weeks after surgery.     Always wash your hands before touching your incisions or removing bandages.   It is not unusual to form a collection of fluid or blood under your incision that may feel firm or squishy- it can take several weeks to months for your body to reabsorb it.  At times, it may even drain.  If that should happen keep the area clean with soap, water,  and cover with a clean gauze dressing. You can change this daily or as needed.     Other medicines   Wait to start aspirin or blood thinners until the day after surgery. You can continue your regular medicines at your normal time the day after surgery.   Your pain medicine may cause constipation (hard, dry stools). To help with this, take the stool softener your doctor gave you or an over-the-counter stool softener or laxative. You can stop taking this when you are no longer taking pain medicine and your bowel movements are back to normal.      For pain or discomfort   Please use over-the-counter medication, use acetaminophen (Tylenol) or ibuprofen (Advil, Motrin) as instructed on the box for discomfort. If you were prescribed narcotic pain medicine, take as needed and as instructed on the bottle (narcotics are not always prescribed for this procedure). Do not take Tylenol if it is in your narcotic pain medication.    Use an ice pack on your surgical cut (incision) for 20 minutes at a time as needed for the first 24 hours. Be sure to protect your skin by putting a cloth between the ice pack and your skin.      Activities   No driving until you feel it s safe to do so. Don t drive while taking narcotic pain medicine.      Diet   You can eat your regular meals after surgery.      Results   You should know any test results about 5 to 7 business days after  surgery       When to call the doctor   Call your doctor if you have:   A fever above 101 F (38.3 C) (taken under the tongue), or a fever or chills lasting more than a day.   Redness at the incision site.   Any fluid or blood draining from the incision, especially if it smells bad.    Severe pain that doesn t improve with pain medicine.      We will call you 2 to 4 days after surgery to review this handout, answer questions and help arrange after-surgery care. If you have questions or concerns, please call [phone] during regular office hours. If you need to call after business hours, call [phone] and ask to page the surgeon on-call.     IMPORTANT:  Prior to your surgical procedure, a nurse will be contacting you to obtain a health history.   If they do not reach you by noon the day prior to your surgery, your surgery will be cancelled. Phone:  [phone] ([facility]) or [phone] ([place]).

## 2024-02-19 ENCOUNTER — PATIENT OUTREACH (OUTPATIENT)
Dept: SURGERY | Facility: CLINIC | Age: 54
End: 2024-02-19
Payer: COMMERCIAL

## 2024-02-19 NOTE — PROGRESS NOTES
Patient recently met with Dr. Dunn regarding a back mass and excision was recommended. Attempted to reach the patient with no answer. LM on  to call scheduling line.    Dr. Dunn  ASC/PCP/90 minutes after returns from [place] (gone 3/28-4/7)

## 2024-02-21 ENCOUNTER — OFFICE VISIT (OUTPATIENT)
Dept: FAMILY MEDICINE | Facility: CLINIC | Age: 54
End: 2024-02-21
Payer: COMMERCIAL

## 2024-02-21 VITALS
DIASTOLIC BLOOD PRESSURE: 81 MMHG | SYSTOLIC BLOOD PRESSURE: 127 MMHG | OXYGEN SATURATION: 93 % | HEART RATE: 115 BPM | WEIGHT: 257.2 LBS | RESPIRATION RATE: 16 BRPM | BODY MASS INDEX: 41.33 KG/M2 | HEIGHT: 66 IN | TEMPERATURE: 97.7 F

## 2024-02-21 DIAGNOSIS — Z71.84 TRAVEL ADVICE ENCOUNTER: Primary | ICD-10-CM

## 2024-02-21 DIAGNOSIS — Z29.89 ALTITUDE SICKNESS PREVENTATIVE MEASURES: ICD-10-CM

## 2024-02-21 PROCEDURE — 90472 IMMUNIZATION ADMIN EACH ADD: CPT | Mod: GA | Performed by: NURSE PRACTITIONER

## 2024-02-21 PROCEDURE — 99402 PREV MED CNSL INDIV APPRX 30: CPT | Mod: 25 | Performed by: NURSE PRACTITIONER

## 2024-02-21 PROCEDURE — 90691 TYPHOID VACCINE IM: CPT | Mod: GA | Performed by: NURSE PRACTITIONER

## 2024-02-21 PROCEDURE — 90471 IMMUNIZATION ADMIN: CPT | Mod: GA | Performed by: NURSE PRACTITIONER

## 2024-02-21 PROCEDURE — 90707 MMR VACCINE SC: CPT | Mod: GA | Performed by: NURSE PRACTITIONER

## 2024-02-21 RX ORDER — ACETAZOLAMIDE 125 MG/1
TABLET ORAL
Qty: 14 TABLET | Refills: 0 | Status: SHIPPED | OUTPATIENT
Start: 2024-02-21 | End: 2024-05-01

## 2024-02-21 RX ORDER — AZITHROMYCIN 500 MG/1
500 TABLET, FILM COATED ORAL DAILY
Qty: 3 TABLET | Refills: 0 | Status: SHIPPED | OUTPATIENT
Start: 2024-02-21 | End: 2024-02-24

## 2024-02-21 ASSESSMENT — PAIN SCALES - GENERAL: PAINLEVEL: NO PAIN (0)

## 2024-02-21 NOTE — NURSING NOTE
Pt received MMR vaccine and typhoid vaccine per provider, Giana Siu, orders.   Prior to immunization administration, verified patients identity using patient s name and date of birth. Pt given VIS forms prior to immunization administration.     Patient instructed to remain in clinic for 15 minutes afterwards, and to report any adverse reactions.     Performed by Boris Burrows RN on 2/21/2024 at 4:43 PM.

## 2024-02-21 NOTE — PROGRESS NOTES
"Nurse Note ( Pre-Travel Consult)    Itinerary:  [place]    Departure Date: 3/29/2024    Return Date: 4/9/2024    Length of Trip: 11 days    Reason for Travel: Tourism         Urban or rural: both    Accommodations: Hotel, Cruise Ship    IMMUNIZATION HISTORY  Have you received any immunizations within the past 4 weeks?  No  Have you ever fainted from having your blood drawn or from an injection?  No  Have you ever had a fever reaction to vaccination?  Yes - Pt reports with COVID vaccine  Have you ever had any bad reaction or side effect from any vaccination?  No  Have you ever had hepatitis A or B vaccine?  Yes  Do you live (or work closely) with anyone who has AIDS, an AIDS-like condition, any other immune disorder or who is on chemotherapy for cancer?  No  Do you have a family history of immunodeficiency?  No  Have you received any injection of immune globulin or any blood products during the past 12 months?  No    Patient roomed by ANKIT Escalante    Subjective  Waylon Calixtogartner is a 53 year old male seen today alone for counsultation for international travel.   Patient will be departing in  5 week(s) and  traveling with spouse.      Patient itinerary :  [facility] [place] [facility] > [facility] >  [facility] [place] then fly to [facility] > [facility] then out through [facility] > which risk for Dengue Fever, food borne illnesses, motor vehicle accidents, and Typhoid. exposure.      Patient's activities will include sightseeing and high altitude exposure.    Patient's country of birth is [place]    Special medical concerns: controlled hypertension   Pre-travel questionnaire was completed by patient and reviewed by provider.     Vitals: /81 (BP Location: Left arm, Patient Position: Sitting, Cuff Size: Adult Regular)   Pulse 115   Temp 97.7  F (36.5  C) (Temporal)   Resp 16   Ht 1.687 m (5' 6.4\")   Wt 116.7 kg (257 lb 3.2 oz)   SpO2 93%   BMI 41.01 kg/m    BMI= Body mass index is 41.01 " kg/m .    EXAM:  General:  Well-nourished, well-developed in no acute distress.  Appears to be stated age, interacts appropriately and expresses understanding of information given to patient.    Current Outpatient Medications   Medication Sig Dispense Refill    acetaZOLAMIDE (DIAMOX) 125 MG tablet Take one tab every 12 hours starting 24 hours prior to ascent and continue for 24 hours while at the highest altitude 14 tablet 0    allopurinol (ZYLOPRIM) 300 MG tablet Take 1 tablet (300 mg) by mouth daily 90 tablet 3    amLODIPine (NORVASC) 10 MG tablet Take 1 tablet (10 mg) by mouth daily 90 tablet 3    aspirin 81 MG tablet Take 1 tablet (81 mg) by mouth daily 30 tablet 11    azithromycin (ZITHROMAX) 500 MG tablet Take 1 tablet (500 mg) by mouth daily for 3 doses Take 1 tablet a day for up to 3 days for severe diarrhea 3 tablet 0    metFORMIN (GLUCOPHAGE) 500 MG tablet TAKE 2 TABLETS BY MOUTH 2 TIMES DAILY WITH MEALS Strength: 500 mg 360 tablet 3    olmesartan (BENICAR) 40 MG tablet Take 1 tablet (40 mg) by mouth daily 90 tablet 3    Omega-3 Fish Oil 500 MG capsule Take 1 capsule (500 mg) by mouth daily 90 capsule 1     Patient Active Problem List   Diagnosis    Essential hypertension    Hyperlipidemia LDL goal <100    Abnormal results of liver function studies    Gout    Preventive measure    Morbid obesity (H)    Lipoma of skin and subcutaneous tissue    Adhesive capsulitis of left shoulder    Lumbar radiculopathy     No Known Allergies      Immunizations discussed include:   Covid 19: Up to date  Hepatitis A:  Up to date  Hepatitis B: Up to date  Influenza: Up to date  Typhoid: Ordered/given today, risks, benefits and side effects reviewed  Rabies: Declined  reviewed managment of a animal bite or scratch (washing wound, seek medical care within 24 hours for post exposure prophylaxis )  Yellow Fever: Not indicated  Japanese Encephalitis: Not indicated  Meningococcus: Not indicated  Tetanus/Diphtheria: Up to  date  Measles/Mumps/Rubella: Ordered/given today  Cholera: Not needed  Polio: Not indicated  Pneumococcal: Under age of 65  Varicella: Immune by disease history per patient report  Shingrix: Up to date  HPV:  Not indicated     TB: low risk     Altitude Exposure on this trip: yes   Past tolerance to Altitude: tolerates 7000    ASSESSMENT/PLAN:  Waylon was seen today for travel clinic.    Diagnoses and all orders for this visit:    Travel advice encounter  -     azithromycin (ZITHROMAX) 500 MG tablet; Take 1 tablet (500 mg) by mouth daily for 3 doses Take 1 tablet a day for up to 3 days for severe diarrhea    Altitude sickness preventative measures  -     acetaZOLAMIDE (DIAMOX) 125 MG tablet; Take one tab every 12 hours starting 24 hours prior to ascent and continue for 24 hours while at the highest altitude    Other orders  -     TYPHOID VACCINE, IM  -     MMR (M-M-R II)      I have reviewed general recommendations for safe travel   including: food/water precautions, insect precautions, roadway safety. Educational materials and [employer] report provided.    Mal[place]ia prophylaxis recommended: none  Symptomatic treatment for traveler's diarrhea: azithromycin  Altitude illness prevention and treatment: Diamox prescription given with instructions on use and education provided on Acute altitude illness recognition and prevention.      Evacuation insurance advised and resources were provided to patient.    Total visit time 30 minutes  with over 50% of time spent counseling patient and shared decision making as detailed above.    Giana Sinha CNP  Certificate in Travel Health

## 2024-02-21 NOTE — PATIENT INSTRUCTIONS
Thank you for visiting the [facility] : [phone]  Today February 21, 2024 you received the    MMR (Measles Mumps Rubella) Vaccine    Typhoid - injectable. This vaccine is valid for two years.     Follow up vaccine appointments can be made as a NURSE ONLY visit at the Travel Clinic, (BE PREPARED TO WAIT, ) or at designated [facility].    If you are receiving the Rabies vaccines series, it is important that you follow the exact schedule ordered.     Pre-travel     We recommend that you purchase Medical Evacuation Insurance prior to your departure.  Https://wwwnc.cdc.gov/travel/page/insurance    [place] your travel plans with the  Department of [place] through STEP ( Smart Traveler Enrollment Program ) https://step.state.gov.  STEP is a free service to allow U[place] citizens and nationals traveling and living abroad to enroll their trip with the nearest U.[place]. Embassy or Consulate.    Animal Exposure: Avoid all mammals even if they look healthy.  If there is a bite, scratch or even a lick, wash area immediately with soap and water for 15 minutes and seek medical care within 24 hours for evaluation of Rabies post exposure treatment.  Contact your Medical Evacuation Insurance.    Repiratory illness prevention strategies ( Covid and Influenza ) CDC recommendations:  Consider wearing a mask in crowded or poorly ventilated indoor areas, including on public transportation and in transportation hubs.  Hand washing: frequent, thorough handwashing with soap and water for 20 seconds. Use an alcohol-based hand  with at least 60% alcohol if soap and water are not readily available. Avoid touching face, nose, eyes, mouth unless you have done appropriate hand washing as above.  VACCINES: Staying up to date on COVID-19 vaccines significantly lowers the risk of getting very sick, being hospitalized, or dying from COVID-19. CDC recommends that everyone stay up to date on their  COVID-19 vaccines, especially people with weakened immune systems.    Travel Covid 19 Testing:  updated 12/06/2021  International travelers: Pre-travel:  See country specific Embassy websites or airline websites.    Post travel: CDC recommends getting tested 3-5 days after your trip     Post-travel illness:  Contact your provider or [facility] if you develop a fever, rash, cough, diarrhea or other symptoms for up to 1 year after travel.  Inform your healthcare provider when and where you traveled to.    Please call the [facility] with any questions [phone]  Or send your provider a 'My Chart' note.

## 2024-02-23 ENCOUNTER — TELEPHONE (OUTPATIENT)
Dept: SURGERY | Facility: CLINIC | Age: 54
End: 2024-02-23
Payer: COMMERCIAL

## 2024-02-23 PROBLEM — D17.30 LIPOMA OF SKIN AND SUBCUTANEOUS TISSUE: Status: ACTIVE | Noted: 2018-02-27

## 2024-02-23 NOTE — TELEPHONE ENCOUNTER
Patient is scheduled for surgery with Dr. Dunn    Spoke with: Waylon    Date of Surgery: 5/17/2024    Location: [facility]    Informed patient they will need an adult : Yes    Pre op with Provider /a    H&P: Scheduled with PCP - Lea Arreola MD    Additional imaging/appointments: n/a    Surgery packet: To be sent via [employer] and the [place] mail     Additional comments: n/a        Santa Garcia on 2/23/2024 at 2:38 PM

## 2024-05-01 ENCOUNTER — OFFICE VISIT (OUTPATIENT)
Dept: FAMILY MEDICINE | Facility: CLINIC | Age: 54
End: 2024-05-01
Payer: COMMERCIAL

## 2024-05-01 VITALS
HEIGHT: 67 IN | HEART RATE: 96 BPM | SYSTOLIC BLOOD PRESSURE: 112 MMHG | TEMPERATURE: 98.4 F | BODY MASS INDEX: 40.81 KG/M2 | OXYGEN SATURATION: 96 % | RESPIRATION RATE: 16 BRPM | WEIGHT: 260 LBS | DIASTOLIC BLOOD PRESSURE: 74 MMHG

## 2024-05-01 DIAGNOSIS — D17.30 LIPOMA OF SKIN AND SUBCUTANEOUS TISSUE: ICD-10-CM

## 2024-05-01 DIAGNOSIS — I10 ESSENTIAL HYPERTENSION: ICD-10-CM

## 2024-05-01 DIAGNOSIS — Z01.818 PREOP GENERAL PHYSICAL EXAM: Primary | ICD-10-CM

## 2024-05-01 DIAGNOSIS — R73.03 PREDIABETES: ICD-10-CM

## 2024-05-01 DIAGNOSIS — E66.01 MORBID OBESITY (H): ICD-10-CM

## 2024-05-01 PROBLEM — K64.9 HEMORRHOIDS: Status: ACTIVE | Noted: 2023-11-07

## 2024-05-01 LAB — HGB BLD-MCNC: 15.2 G/DL (ref 13.3–17.7)

## 2024-05-01 PROCEDURE — 36415 COLL VENOUS BLD VENIPUNCTURE: CPT | Performed by: INTERNAL MEDICINE

## 2024-05-01 PROCEDURE — 80048 BASIC METABOLIC PNL TOTAL CA: CPT | Performed by: INTERNAL MEDICINE

## 2024-05-01 PROCEDURE — 93000 ELECTROCARDIOGRAM COMPLETE: CPT | Performed by: INTERNAL MEDICINE

## 2024-05-01 PROCEDURE — 99214 OFFICE O/P EST MOD 30 MIN: CPT | Performed by: INTERNAL MEDICINE

## 2024-05-01 PROCEDURE — 85018 HEMOGLOBIN: CPT | Performed by: INTERNAL MEDICINE

## 2024-05-01 ASSESSMENT — PAIN SCALES - GENERAL: PAINLEVEL: NO PAIN (0)

## 2024-05-01 NOTE — PATIENT INSTRUCTIONS
As discussed, will do all the pertinent work up needed for your clearance  Hold Metformin and Olmesartan on the day of surgery.   Hold off on all the OTC supplements and ASA 7 days before the procedure.   OK to take Tylenol as needed for pain.   =======================  Preparing for Your Surgery  Getting started  A nurse will call you to review your health history and instructions. They will give you an arrival time based on your scheduled surgery time. Please be ready to share:  Your doctor's clinic name and phone number  Your medical, surgical, and anesthesia history  A list of allergies and sensitivities  A list of medicines, including herbal treatments and over-the-counter drugs  Whether the patient has a legal guardian (ask how to send us the papers in advance)  Please tell us if you're pregnant--or if there's any chance you might be pregnant. Some surgeries may injure a fetus (unborn baby), so they require a pregnancy test. Surgeries that are safe for a fetus don't always need a test, and you can choose whether to have one.   If you have a child who's having surgery, please ask for a copy of Preparing for Your Child's Surgery.    Preparing for surgery  Within 10 to 30 days of surgery: Have a pre-op exam (sometimes called an H&P, or History and Physical). This can be done at a clinic or pre-operative center.  If you're having a , you may not need this exam. Talk to your care team.  At your pre-op exam, talk to your care team about all medicines you take. If you need to stop any medicines before surgery, ask when to start taking them again.  We do this for your safety. Many medicines can make you bleed too much during surgery. Some change how well surgery (anesthesia) drugs work.  Call your insurance company to let them know you're having surgery. (If you don't have insurance, call [phone].)  Call your clinic if there's any change in your health. This includes signs of a cold or flu (sore throat,  runny nose, cough, rash, fever). It also includes a scrape or scratch near the surgery site.  If you have questions on the day of surgery, call your hospital or surgery center.  Eating and drinking guidelines  For your safety: Unless your surgeon tells you otherwise, follow the guidelines below.  Eat and drink as usual until 8 hours before you arrive for surgery. After that, no food or milk.  Drink clear liquids until 2 hours before you arrive. These are liquids you can see through, like water, Gatorade, and Propel Water. They also include plain black coffee and tea (no cream or milk), candy, and breath mints. You can spit out gum when you arrive.  If you drink alcohol: Stop drinking it the night before surgery.  If your care team tells you to take medicine on the morning of surgery, it's okay to take it with a sip of water.  Preventing infection  Shower or bathe the night before and morning of your surgery. Follow the instructions your clinic gave you. (If no instructions, use regular soap.)  Don't shave or clip hair near your surgery site. We'll remove the hair if needed.  Don't smoke or vape the morning of surgery. You may chew nicotine gum up to 2 hours before surgery. A nicotine patch is okay.  Note: Some surgeries require you to completely quit smoking and nicotine. Check with your surgeon.  Your care team will make every effort to keep you safe from infection. We will:  Clean our hands often with soap and water (or an alcohol-based hand rub).  Clean the skin at your surgery site with a special soap that kills germs.  Give you a special gown to keep you warm. (Cold raises the risk of infection.)  Wear special hair covers, masks, gowns and gloves during surgery.  Give antibiotic medicine, if prescribed. Not all surgeries need antibiotics.  What to bring on the day of surgery  Photo ID and insurance card  Copy of your health care directive, if you have one  Glasses and hearing aids (bring cases)  You can't wear  contacts during surgery  Inhaler and eye drops, if you use them (tell us about these when you arrive)  CPAP machine or breathing device, if you use them  A few personal items, if spending the night  If you have . . .  A pacemaker, ICD (cardiac defibrillator) or other implant: Bring the ID card.  An implanted stimulator: Bring the remote control.  A legal guardian: Bring a copy of the certified (court-stamped) guardianship papers.  Please remove any jewelry, including body piercings. Leave jewelry and other valuables at home.  If you're going home the day of surgery  You must have a responsible adult drive you home. They should stay with you overnight as well.  If you don't have someone to stay with you, and you aren't safe to go home alone, we may keep you overnight. Insurance often won't pay for this.  After surgery  If it's hard to control your pain or you need more pain medicine, please call your surgeon's office.  Questions?   If you have any questions for your care team, list them here: _________________________________________________________________________________________________________________________________________________________________________ ____________________________________ ____________________________________ ____________________________________  For informational purposes only. Not to replace the advice of your health care provider. Copyright   2003, 2019 [facility]. All rights reserved. Clinically reviewed by Inez Talavera MD. [employer] [id number] - REV 12/22.

## 2024-05-01 NOTE — PROGRESS NOTES
Preoperative Evaluation  [address] [address]  Phone: [phone]  Primary Provider: Mary Bustos  Pre-op Performing Provider: MARY BUSTOS  May 1, 2024       Waylon is a 53 year old, presenting for the following:  Pre-Op Exam        5/1/2024     4:14 PM   Additional Questions   Roomed by Amaal     Surgical Information  Surgery/Procedure: EXCISION, MASS, BACK   Surgery Location: [facility] [place]  Surgeon: Jeremie Dunn MD   Surgery Date: 5/17/24  Time of Surgery: 12 pm  Where patient plans to recover: At home with family  Fax number for surgical facility: Note does not need to be faxed, will be available electronically in Epic.    Assessment & Plan     The proposed surgical procedure is considered LOW risk.    Problem List Items Addressed This Visit          Digestive    Morbid obesity (H)       Circulatory    Essential hypertension       Musculoskeletal and Integumentary    Lipoma of skin and subcutaneous tissue     Other Visit Diagnoses       Preop general physical exam    -  Primary    Relevant Orders    Basic metabolic panel  (Ca, Cl, CO2, Creat, Gluc, K, Na, BUN)    Hemoglobin (Completed)    EKG 12-lead complete w/read - Clinics (Completed)    Prediabetes                       Risks and Recommendations  The patient has the following additional risks and recommendations for perioperative complications:   - Morbid obesity (BMI >40)  Diabetes:  - Patient is not on insulin therapy: regular NPO guidelines can be followed.   Pulmonary:    - Incentive spirometry post-op   - Consider Respiratory Therapy (Respiratory Care IP Consult) post-op   - Possible sleep apnea cannot be excluded as patient does have snoring.    Antiplatelet or Anticoagulation Medication Instructions   - aspirin: Discontinue aspirin 7-10 days prior to procedure to reduce bleeding risk. It should be resumed postoperatively.      Additional Medication Instructions  Patient is to take all scheduled medications on the day of surgery EXCEPT for modifications listed below:   - ACE/ARB: HOLD on day of surgery (minimum 11 hours for general anesthesia).   - metformin: HOLD day of surgery.   - ibuprofen (Advil, Motrin): HOLD 1 day before surgery.    - naproxen (Aleve, Naprosyn): HOLD 4 days before surgery.     Recommendation  APPROVAL GIVEN to proceed with proposed procedure, without further diagnostic evaluation.    Review of external notes as documented elsewhere in note  30 minutes spent by me on the date of the encounter doing chart review, review of outside records, review of test results, interpretation of tests, patient visit, and documentation     Subjective       HPI related to upcoming procedure:         4/29/2024     3:36 PM   Preop Questions   1. Have you ever had a heart attack or stroke? No   2. Have you ever had surgery on your heart or blood vessels, such as a stent placement, a coronary artery bypass, or surgery on an artery in your head, neck, heart, or legs? No   3. Do you have chest pain with activity? No   4. Do you have a history of  heart failure? No   5. Do you currently have a cold, bronchitis or symptoms of other infection? No   6. Do you have a cough, shortness of breath, or wheezing? No   7. Do you or anyone in your family have previous history of blood clots? UNKNOWN    8. Do you or does anyone in your family have a serious bleeding problem such as prolonged bleeding following surgeries or cuts? No   9. Have you ever had problems with anemia or been told to take iron pills? No   10. Have you had any abnormal blood loss such as black, tarry or bloody stools? No   11. Have you ever had a blood transfusion? No   12. Are you willing to have a blood transfusion if it is medically needed before, during, or after your surgery? Yes   13. Have you or any of your relatives ever had problems with anesthesia? UNKNOWN    14. Do  you have sleep apnea, excessive snoring or daytime drowsiness? No   15. Do you have any artifical heart valves or other implanted medical devices like a pacemaker, defibrillator, or continuous glucose monitor? No   16. Do you have artificial joints? No   17. Are you allergic to latex? No       Health Care Directive  Patient does not have a Health Care Directive or Living Will: N/A     Preoperative Review of    reviewed - no record of controlled substances prescribed.      Status of Chronic Conditions:  See problem list for active medical problems.  Problems all longstanding and stable, except as noted/documented.  See ROS for pertinent symptoms related to these conditions.    Patient Active Problem List    Diagnosis Date Noted    Hemorrhoids 11/07/2023     Priority: Medium    Lumbar radiculopathy 03/15/2023     Priority: Medium    Adhesive capsulitis of left shoulder 08/18/2020     Priority: Medium    Lipoma of skin and subcutaneous tissue 02/27/2018     Priority: Medium    Morbid obesity (H) 12/12/2017     Priority: Medium    Gout 03/01/2014     Priority: Medium     Hx of ; details?  Problem list name updated by automated process. Provider to review      Preventive measure 03/01/2014     Priority: Medium     Pre-visit report from [facility] reviewed      Abnormal results of liver function studies 02/28/2014     Priority: Medium    Essential hypertension 04/02/2013     Priority: Medium    Hyperlipidemia LDL goal <100 04/02/2013     Priority: Medium     And very low HDL ( 10); did not tolerate niaspan        Past Medical History:   Diagnosis Date    Hyperlipidemia     Hypertension      History reviewed. No pertinent surgical history.  Current Outpatient Medications   Medication Sig Dispense Refill    allopurinol (ZYLOPRIM) 300 MG tablet Take 1 tablet (300 mg) by mouth daily 90 tablet 3    amLODIPine (NORVASC) 10 MG tablet Take 1 tablet (10 mg) by mouth daily 90 tablet 3    aspirin 81 MG tablet Take 1 tablet (81 mg)  "by mouth daily 30 tablet 11    metFORMIN (GLUCOPHAGE) 500 MG tablet TAKE 2 TABLETS BY MOUTH 2 TIMES DAILY WITH MEALS Strength: 500 mg 360 tablet 3    olmesartan (BENICAR) 40 MG tablet Take 1 tablet (40 mg) by mouth daily 90 tablet 3    Omega-3 Fish Oil 500 MG capsule Take 1 capsule (500 mg) by mouth daily 90 capsule 1       No Known Allergies     Social History     Tobacco Use    Smoking status: Never    Smokeless tobacco: Never   Substance Use Topics    Alcohol use: Yes     Comment: occ.     Family History   Problem Relation Age of Onset    Diabetes Mother     Cerebrovascular Disease Mother         minor stroke    Obesity Mother     Family History Negative Father         health hx unknown; smoker    Substance Abuse Father         alcoholism    Diabetes Maternal Grandmother     Obesity Maternal Grandmother     Thyroid Disease Other     Thyroid Disease Cousin      History   Drug Use No         Review of Systems    Review of Systems  Constitutional, HEENT, cardiovascular, pulmonary, GI, , musculoskeletal, neuro, skin, endocrine and psych systems are negative, except as otherwise noted.    Objective    /74   Pulse 96   Temp 98.4  F (36.9  C) (Temporal)   Resp 16   Ht 1.702 m (5' 7\")   Wt 117.9 kg (260 lb)   SpO2 96%   BMI 40.72 kg/m     Estimated body mass index is 40.72 kg/m  as calculated from the following:    Height as of this encounter: 1.702 m (5' 7\").    Weight as of this encounter: 117.9 kg (260 lb).  Physical Exam  GENERAL: alert and no distress  EYES: Eyes grossly normal to inspection, PERRL and conjunctivae and sclerae normal  HENT: ear canals and TM's normal, nose and mouth without ulcers or lesions  NECK: no adenopathy, no asymmetry, masses, or scars  POSITIVE for a large Lipoma on the upper thoracic region more on the left of the midline measuring around 10 to 15 cm in diameter, no erythema or tenderness on palpation.  No signs of inflammation.  RESP: lungs clear to auscultation - no rales, " rhonchi or wheezes  CV: regular rate and rhythm, normal S1 S2, no S3 or S4, no murmur, click or rub, no peripheral edema  ABDOMEN: soft, nontender, no hepatosplenomegaly, no masses and bowel sounds normal  MS: no gross musculoskeletal defects noted, no edema  SKIN: no suspicious lesions or rashes  NEURO: Normal strength and tone, mentation intact and speech normal  PSYCH: mentation appears normal, affect normal/bright    Recent Labs   Lab Test 01/17/24  1034 03/15/23  1122   HGB 16.2 15.4    243    141   POTASSIUM 4.3 4.2   CR 0.88 0.86   A1C 5.2 5.3        Diagnostics  Labs pending at this time.  Results will be reviewed when available.  Recent Results (from the past 720 hour(s))   Hemoglobin    Collection Time: 05/01/24  5:09 PM   Result Value Ref Range    Hemoglobin 15.2 13.3 - 17.7 g/dL      EKG required for risk factors of morbid obesity with BMI greater than 40, hypertension on antihypertensives, prediabetes on metformin, general anesthesia, no EKG done anytime and not completed in the last 90 days.   EKG: appears normal, NSR, normal axis, normal intervals, no acute ST/T changes c/w ischemia, no LVH by voltage criteria, unchanged from previous tracings    Revised Cardiac Risk Index (RCRI)  The patient has the following serious cardiovascular risks for perioperative complications:   - No serious cardiac risks = 0 points     RCRI Interpretation: 0 points: Class I (very low risk - 0.4% complication rate)         Signed Electronically by: Lea Arreola MD  Copy of this evaluation report is provided to requesting physician.

## 2024-05-03 LAB
ANION GAP SERPL CALCULATED.3IONS-SCNC: 13 MMOL/L (ref 7–15)
BUN SERPL-MCNC: 14.7 MG/DL (ref 6–20)
CALCIUM SERPL-MCNC: 9.4 MG/DL (ref 8.6–10)
CHLORIDE SERPL-SCNC: 102 MMOL/L (ref 98–107)
CREAT SERPL-MCNC: 0.97 MG/DL (ref 0.67–1.17)
DEPRECATED HCO3 PLAS-SCNC: 25 MMOL/L (ref 22–29)
EGFRCR SERPLBLD CKD-EPI 2021: >90 ML/MIN/1.73M2
GLUCOSE SERPL-MCNC: 84 MG/DL (ref 70–99)
POTASSIUM SERPL-SCNC: 4.4 MMOL/L (ref 3.4–5.3)
SODIUM SERPL-SCNC: 140 MMOL/L (ref 135–145)

## 2024-05-15 ENCOUNTER — TELEPHONE (OUTPATIENT)
Dept: SURGERY | Facility: CLINIC | Age: 54
End: 2024-05-15
Payer: COMMERCIAL

## 2024-05-15 NOTE — TELEPHONE ENCOUNTER
Spoke with patient, explained that his surgery arrival and start times with Dr. Dunn on Friday 5/17/2024 have changed. Updated patient that Pre-admissions will be calling him with this info and to review how this may affect his eating/ drinking instructions.     Updated patient that this is just a courtesy call to provide him with advance notice of the time change on Friday 5/17/2024.    New arrival time: 9:15 AM    New surgery start time: 10:45 AM    Patient had further questions/that were forwarded to RNCC Jerilyn.    Lina Palomino  Tere-op Coordinator  [facility]  Direct Phone: [phone]

## 2024-05-16 ENCOUNTER — ANESTHESIA EVENT (OUTPATIENT)
Dept: SURGERY | Facility: AMBULATORY SURGERY CENTER | Age: 54
End: 2024-05-16
Payer: COMMERCIAL

## 2024-05-16 NOTE — ANESTHESIA PREPROCEDURE EVALUATION
"Anesthesia Pre-Procedure Evaluation    Patient: Waylon Santiago   MRN: [id number] : 1970        Procedure : Procedure(s):  EXCISION, MASS, BACK          Past Medical History:   Diagnosis Date    Hyperlipidemia     Hypertension       No past surgical history on file.   No Known Allergies   Social History     Tobacco Use    Smoking status: Never    Smokeless tobacco: Never   Substance Use Topics    Alcohol use: Yes     Comment: occ.      Wt Readings from Last 1 Encounters:   24 117.9 kg (260 lb)              OUTSIDE LABS:  CBC:   Lab Results   Component Value Date    WBC 7.5 2024    WBC 7.1 03/15/2023    HGB 15.2 2024    HGB 16.2 2024    HCT 47.4 2024    HCT 45.6 03/15/2023     2024     03/15/2023     BMP:   Lab Results   Component Value Date     2024     2024    POTASSIUM 4.4 2024    POTASSIUM 4.3 2024    CHLORIDE 102 2024    CHLORIDE 103 2024    CO2 25 2024    CO2 26 2024    BUN 14.7 2024    BUN 17.1 2024    CR 0.97 2024    CR 0.88 2024    GLC 84 2024    GLC 93 2024     COAGS:   Lab Results   Component Value Date    PTT 33 10/09/2019     POC: No results found for: \"BGM\", \"HCG\", \"HCGS\"  HEPATIC:   Lab Results   Component Value Date    ALBUMIN 4.6 2024    PROTTOTAL 7.1 2024    ALT 50 2024    AST 29 2024    ALKPHOS 122 2024    BILITOTAL 0.3 2024     OTHER:   Lab Results   Component Value Date    A1C 5.2 2024    AUBREE 9.4 2024    TSH 3.75 03/15/2023       Anesthesia Plan    ASA Status:  3       Anesthesia Type: General.     - Airway: ETT   Induction: Intravenous, Propofol.   Maintenance: Balanced.        Consents    Anesthesia Plan(s) and associated risks, benefits, and realistic alternatives discussed. Questions answered and patient/representative(s) expressed understanding.     - Discussed: Risks, Benefits and " "Alternatives for BOTH SEDATION and the PROCEDURE were discussed     - Discussed with:       - Extended Intubation/Ventilatory Support Discussed: No.      - Patient is DNR/DNI Status: No     Use of blood products discussed: No .     Postoperative Care    Pain management: IV analgesics, Oral pain medications, Multi-modal analgesia.   PONV prophylaxis: Ondansetron (or other 5HT-3), Dexamethasone or Solumedrol, Background Propofol Infusion     Comments:               Jesu Corrigan MD    I have reviewed the pertinent notes and labs in the chart from the past 30 days and (re)examined the patient.  Any updates or changes from those notes are reflected in this note.              # Severe Obesity: Estimated body mass index is 40.72 kg/m  as calculated from the following:    Height as of 5/1/24: 1.702 m (5' 7\").    Weight as of 5/1/24: 117.9 kg (260 lb).      "

## 2024-05-17 ENCOUNTER — ANESTHESIA (OUTPATIENT)
Dept: SURGERY | Facility: AMBULATORY SURGERY CENTER | Age: 54
End: 2024-05-17
Payer: COMMERCIAL

## 2024-05-17 ENCOUNTER — HOSPITAL ENCOUNTER (OUTPATIENT)
Facility: AMBULATORY SURGERY CENTER | Age: 54
Discharge: HOME | End: 2024-05-17
Attending: SURGERY
Payer: COMMERCIAL

## 2024-05-17 VITALS
HEART RATE: 92 BPM | RESPIRATION RATE: 15 BRPM | HEIGHT: 67 IN | OXYGEN SATURATION: 95 % | TEMPERATURE: 97 F | SYSTOLIC BLOOD PRESSURE: 106 MMHG | WEIGHT: 260 LBS | BODY MASS INDEX: 40.81 KG/M2 | DIASTOLIC BLOOD PRESSURE: 68 MMHG

## 2024-05-17 DIAGNOSIS — D17.30 LIPOMA OF SKIN AND SUBCUTANEOUS TISSUE: Primary | ICD-10-CM

## 2024-05-17 PROCEDURE — 12034 INTMD RPR S/TR/EXT 7.6-12.5: CPT | Performed by: NURSE ANESTHETIST, CERTIFIED REGISTERED

## 2024-05-17 PROCEDURE — 12034 INTMD RPR S/TR/EXT 7.6-12.5: CPT | Performed by: SURGERY

## 2024-05-17 PROCEDURE — 12034 INTMD RPR S/TR/EXT 7.6-12.5: CPT | Mod: GC | Performed by: SURGERY

## 2024-05-17 PROCEDURE — 12034 INTMD RPR S/TR/EXT 7.6-12.5: CPT | Performed by: ANESTHESIOLOGY

## 2024-05-17 PROCEDURE — 11406 EXC TR-EXT B9+MARG >4.0 CM: CPT | Performed by: SURGERY

## 2024-05-17 PROCEDURE — 88304 TISSUE EXAM BY PATHOLOGIST: CPT | Mod: TC | Performed by: SURGERY

## 2024-05-17 PROCEDURE — 11406 EXC TR-EXT B9+MARG >4.0 CM: CPT | Mod: GC | Performed by: SURGERY

## 2024-05-17 PROCEDURE — 88304 TISSUE EXAM BY PATHOLOGIST: CPT | Mod: 26 | Performed by: PATHOLOGY

## 2024-05-17 RX ORDER — CEFAZOLIN SODIUM 2 G/50ML
2 SOLUTION INTRAVENOUS
Status: COMPLETED | OUTPATIENT
Start: 2024-05-17 | End: 2024-05-17

## 2024-05-17 RX ORDER — FENTANYL CITRATE 50 UG/ML
25 INJECTION, SOLUTION INTRAMUSCULAR; INTRAVENOUS EVERY 5 MIN PRN
Status: DISCONTINUED | OUTPATIENT
Start: 2024-05-17 | End: 2024-05-17 | Stop reason: HOSPADM

## 2024-05-17 RX ORDER — OXYCODONE HYDROCHLORIDE 5 MG/1
5 TABLET ORAL EVERY 6 HOURS PRN
Qty: 12 TABLET | Refills: 0 | Status: SHIPPED | OUTPATIENT
Start: 2024-05-17 | End: 2024-05-20

## 2024-05-17 RX ORDER — LABETALOL HYDROCHLORIDE 5 MG/ML
10 INJECTION, SOLUTION INTRAVENOUS
Status: DISCONTINUED | OUTPATIENT
Start: 2024-05-17 | End: 2024-05-17 | Stop reason: HOSPADM

## 2024-05-17 RX ORDER — DEXAMETHASONE SODIUM PHOSPHATE 10 MG/ML
4 INJECTION, SOLUTION INTRAMUSCULAR; INTRAVENOUS
Status: DISCONTINUED | OUTPATIENT
Start: 2024-05-17 | End: 2024-05-17 | Stop reason: HOSPADM

## 2024-05-17 RX ORDER — HYDROMORPHONE HYDROCHLORIDE 1 MG/ML
0.2 INJECTION, SOLUTION INTRAMUSCULAR; INTRAVENOUS; SUBCUTANEOUS EVERY 5 MIN PRN
Status: DISCONTINUED | OUTPATIENT
Start: 2024-05-17 | End: 2024-05-17 | Stop reason: HOSPADM

## 2024-05-17 RX ORDER — LIDOCAINE HYDROCHLORIDE 20 MG/ML
INJECTION, SOLUTION INFILTRATION; PERINEURAL PRN
Status: DISCONTINUED | OUTPATIENT
Start: 2024-05-17 | End: 2024-05-17

## 2024-05-17 RX ORDER — CEFAZOLIN SODIUM 2 G/50ML
2 SOLUTION INTRAVENOUS SEE ADMIN INSTRUCTIONS
Status: DISCONTINUED | OUTPATIENT
Start: 2024-05-17 | End: 2024-05-17 | Stop reason: HOSPADM

## 2024-05-17 RX ORDER — ONDANSETRON 2 MG/ML
4 INJECTION INTRAMUSCULAR; INTRAVENOUS EVERY 30 MIN PRN
Status: DISCONTINUED | OUTPATIENT
Start: 2024-05-17 | End: 2024-05-18 | Stop reason: HOSPADM

## 2024-05-17 RX ORDER — ACETAMINOPHEN 325 MG/1
325-650 TABLET ORAL EVERY 6 HOURS PRN
Qty: 20 TABLET | Refills: 1 | Status: SHIPPED | OUTPATIENT
Start: 2024-05-17 | End: 2024-05-22

## 2024-05-17 RX ORDER — IBUPROFEN 600 MG/1
600 TABLET, FILM COATED ORAL EVERY 6 HOURS PRN
Qty: 20 TABLET | Refills: 0 | Status: SHIPPED | OUTPATIENT
Start: 2024-05-17 | End: 2024-05-22

## 2024-05-17 RX ORDER — FENTANYL CITRATE 50 UG/ML
50 INJECTION, SOLUTION INTRAMUSCULAR; INTRAVENOUS EVERY 5 MIN PRN
Status: DISCONTINUED | OUTPATIENT
Start: 2024-05-17 | End: 2024-05-17 | Stop reason: HOSPADM

## 2024-05-17 RX ORDER — FENTANYL CITRATE 50 UG/ML
INJECTION, SOLUTION INTRAMUSCULAR; INTRAVENOUS PRN
Status: DISCONTINUED | OUTPATIENT
Start: 2024-05-17 | End: 2024-05-17

## 2024-05-17 RX ORDER — OXYCODONE HYDROCHLORIDE 5 MG/1
10 TABLET ORAL
Status: DISCONTINUED | OUTPATIENT
Start: 2024-05-17 | End: 2024-05-18 | Stop reason: HOSPADM

## 2024-05-17 RX ORDER — ONDANSETRON 2 MG/ML
4 INJECTION INTRAMUSCULAR; INTRAVENOUS EVERY 30 MIN PRN
Status: DISCONTINUED | OUTPATIENT
Start: 2024-05-17 | End: 2024-05-17 | Stop reason: HOSPADM

## 2024-05-17 RX ORDER — NALOXONE HYDROCHLORIDE 0.4 MG/ML
0.1 INJECTION, SOLUTION INTRAMUSCULAR; INTRAVENOUS; SUBCUTANEOUS
Status: DISCONTINUED | OUTPATIENT
Start: 2024-05-17 | End: 2024-05-17 | Stop reason: HOSPADM

## 2024-05-17 RX ORDER — NALOXONE HYDROCHLORIDE 0.4 MG/ML
0.1 INJECTION, SOLUTION INTRAMUSCULAR; INTRAVENOUS; SUBCUTANEOUS
Status: DISCONTINUED | OUTPATIENT
Start: 2024-05-17 | End: 2024-05-18 | Stop reason: HOSPADM

## 2024-05-17 RX ORDER — LIDOCAINE 40 MG/G
CREAM TOPICAL
Status: DISCONTINUED | OUTPATIENT
Start: 2024-05-17 | End: 2024-05-17 | Stop reason: HOSPADM

## 2024-05-17 RX ORDER — OXYCODONE HYDROCHLORIDE 5 MG/1
5 TABLET ORAL
Status: COMPLETED | OUTPATIENT
Start: 2024-05-17 | End: 2024-05-17

## 2024-05-17 RX ORDER — SODIUM CHLORIDE, SODIUM LACTATE, POTASSIUM CHLORIDE, CALCIUM CHLORIDE 600; 310; 30; 20 MG/100ML; MG/100ML; MG/100ML; MG/100ML
INJECTION, SOLUTION INTRAVENOUS CONTINUOUS
Status: DISCONTINUED | OUTPATIENT
Start: 2024-05-17 | End: 2024-05-17 | Stop reason: HOSPADM

## 2024-05-17 RX ORDER — PROPOFOL 10 MG/ML
INJECTION, EMULSION INTRAVENOUS PRN
Status: DISCONTINUED | OUTPATIENT
Start: 2024-05-17 | End: 2024-05-17

## 2024-05-17 RX ORDER — DEXAMETHASONE SODIUM PHOSPHATE 10 MG/ML
4 INJECTION, SOLUTION INTRAMUSCULAR; INTRAVENOUS
Status: DISCONTINUED | OUTPATIENT
Start: 2024-05-17 | End: 2024-05-18 | Stop reason: HOSPADM

## 2024-05-17 RX ORDER — ONDANSETRON 4 MG/1
4 TABLET, ORALLY DISINTEGRATING ORAL EVERY 30 MIN PRN
Status: DISCONTINUED | OUTPATIENT
Start: 2024-05-17 | End: 2024-05-18 | Stop reason: HOSPADM

## 2024-05-17 RX ORDER — PROPOFOL 10 MG/ML
INJECTION, EMULSION INTRAVENOUS CONTINUOUS PRN
Status: DISCONTINUED | OUTPATIENT
Start: 2024-05-17 | End: 2024-05-17

## 2024-05-17 RX ORDER — ONDANSETRON 4 MG/1
4 TABLET, ORALLY DISINTEGRATING ORAL EVERY 30 MIN PRN
Status: DISCONTINUED | OUTPATIENT
Start: 2024-05-17 | End: 2024-05-17 | Stop reason: HOSPADM

## 2024-05-17 RX ORDER — ACETAMINOPHEN 325 MG/1
975 TABLET ORAL ONCE
Status: COMPLETED | OUTPATIENT
Start: 2024-05-17 | End: 2024-05-17

## 2024-05-17 RX ORDER — HYDROMORPHONE HYDROCHLORIDE 1 MG/ML
0.4 INJECTION, SOLUTION INTRAMUSCULAR; INTRAVENOUS; SUBCUTANEOUS EVERY 5 MIN PRN
Status: DISCONTINUED | OUTPATIENT
Start: 2024-05-17 | End: 2024-05-17 | Stop reason: HOSPADM

## 2024-05-17 RX ORDER — HYDRALAZINE HYDROCHLORIDE 20 MG/ML
2.5-5 INJECTION INTRAMUSCULAR; INTRAVENOUS EVERY 10 MIN PRN
Status: DISCONTINUED | OUTPATIENT
Start: 2024-05-17 | End: 2024-05-17 | Stop reason: HOSPADM

## 2024-05-17 RX ADMIN — FENTANYL CITRATE 50 MCG: 50 INJECTION, SOLUTION INTRAMUSCULAR; INTRAVENOUS at 13:51

## 2024-05-17 RX ADMIN — Medication 50 MG: at 11:29

## 2024-05-17 RX ADMIN — PROPOFOL 150 MCG/KG/MIN: 10 INJECTION, EMULSION INTRAVENOUS at 11:59

## 2024-05-17 RX ADMIN — PROPOFOL 150 MCG/KG/MIN: 10 INJECTION, EMULSION INTRAVENOUS at 11:29

## 2024-05-17 RX ADMIN — ACETAMINOPHEN 975 MG: 325 TABLET ORAL at 09:28

## 2024-05-17 RX ADMIN — Medication 100 MCG: at 11:45

## 2024-05-17 RX ADMIN — Medication 100 MCG: at 12:30

## 2024-05-17 RX ADMIN — Medication 100 MCG: at 11:57

## 2024-05-17 RX ADMIN — CEFAZOLIN SODIUM 2 G: 2 SOLUTION INTRAVENOUS at 11:33

## 2024-05-17 RX ADMIN — Medication 100 MCG: at 12:12

## 2024-05-17 RX ADMIN — SODIUM CHLORIDE, SODIUM LACTATE, POTASSIUM CHLORIDE, CALCIUM CHLORIDE: 600; 310; 30; 20 INJECTION, SOLUTION INTRAVENOUS at 11:24

## 2024-05-17 RX ADMIN — Medication 0.4 MCG/KG/MIN: at 12:45

## 2024-05-17 RX ADMIN — FENTANYL CITRATE 50 MCG: 50 INJECTION, SOLUTION INTRAMUSCULAR; INTRAVENOUS at 11:49

## 2024-05-17 RX ADMIN — LIDOCAINE HYDROCHLORIDE 100 MG: 20 INJECTION, SOLUTION INFILTRATION; PERINEURAL at 11:33

## 2024-05-17 RX ADMIN — FENTANYL CITRATE 50 MCG: 50 INJECTION, SOLUTION INTRAMUSCULAR; INTRAVENOUS at 11:33

## 2024-05-17 RX ADMIN — PROPOFOL 200 MG: 10 INJECTION, EMULSION INTRAVENOUS at 11:33

## 2024-05-17 RX ADMIN — PROPOFOL 100 MCG/KG/MIN: 10 INJECTION, EMULSION INTRAVENOUS at 12:40

## 2024-05-17 RX ADMIN — SODIUM CHLORIDE, SODIUM LACTATE, POTASSIUM CHLORIDE, CALCIUM CHLORIDE: 600; 310; 30; 20 INJECTION, SOLUTION INTRAVENOUS at 13:46

## 2024-05-17 RX ADMIN — Medication 100 MCG: at 11:59

## 2024-05-17 RX ADMIN — Medication 100 MCG: at 12:35

## 2024-05-17 RX ADMIN — Medication 100 MCG: at 12:27

## 2024-05-17 RX ADMIN — Medication 100 MCG: at 12:05

## 2024-05-17 RX ADMIN — Medication 100 MCG: at 12:17

## 2024-05-17 RX ADMIN — OXYCODONE HYDROCHLORIDE 5 MG: 5 TABLET ORAL at 14:42

## 2024-05-17 RX ADMIN — Medication 100 MCG: at 11:54

## 2024-05-17 NOTE — ANESTHESIA CARE TRANSFER NOTE
Patient: Waylon Santiago    Procedure: Procedure(s):  EXCISION, MASS, BACK       Diagnosis: Lipoma of skin and subcutaneous tissue [D17.30]  Diagnosis Additional Information: No value filed.    Anesthesia Type:   General     Note:    Oropharynx: oropharynx clear of all foreign objects and spontaneously breathing  Level of Consciousness: awake  Oxygen Supplementation: face mask  Level of Supplemental Oxygen (L/min / FiO2): 6  Independent Airway: airway patency satisfactory and stable  Dentition: dentition unchanged  Vital Signs Stable: post-procedure vital signs reviewed and stable  Report to RN Given: handoff report given  Patient transferred to: PACU    Handoff Report: Identifed the Patient, Identified the Reponsible Provider, Reviewed the pertinent medical history, Discussed the surgical course, Reviewed Intra-OP anesthesia mangement and issues during anesthesia, Set expectations for post-procedure period and Allowed opportunity for questions and acknowledgement of understanding      Vitals:  Vitals Value Taken Time   /74 05/17/24 1407   Temp 36.4  C (97.6  F) 05/17/24 1407   Pulse 92 05/17/24 1409   Resp 8 05/17/24 1409   SpO2 95 % 05/17/24 1409   Vitals shown include unfiled device data.    Electronically Signed By: JOHNATHON Henao CRNA  May 17, 2024  2:10 PM

## 2024-05-17 NOTE — DISCHARGE INSTRUCTIONS
"[facility]  Home Care Following Anesthesia  For 24 hours after surgery:  Get plenty of rest.  A responsible adult must stay with you for at least 24 hours after you leave the surgery center.  Do not drive or use heavy equipment.  If you have weakness or tingling, don't drive or use heavy equipment until this feeling goes away.   Do not drink alcohol.   Avoid strenuous or risky activities.  Ask for help when climbing stairs.  You may feel lightheaded.  IF so, sit for a few minutes before standing.  Have someone help you get up.   If you have nausea (feel sick to your stomach): Drink only clear liquids such as apple juice, ginger ale, broth or 7-Up.  Rest may also help.  Be sure to drink enough fluids.  Move to a regular diet as you feel able.   You may have a slight fever.  Call the doctor if your fever is over 100 F (37.7 C) (taken under the tongue) or lasts longer than 24 hours.  You may have a dry mouth, a sore throat, muscle aches or trouble sleeping. These should go away after 24 hours.  Do not make important or legal decisions.   It is recommended to avoid smoking.        Today you received a Marcaine or bupivacaine block to numb the nerves near your surgery site.  This is a block using local anesthetic or \"numbing\" medication injected around the nerves to anesthetize or \"numb\" the area supplied by those nerves.  This block is injected into the muscle layer near your surgical site.  The medication may numb the location where you had surgery for 6-18 hours, but may last up to 24 hours.  If your surgical site is an arm or leg you should be careful with your affected limb, since it is possible to injure your limb without being aware of it due to the numbing.  Until full feeling returns, you should guard against bumping or hitting your limb, and avoid extreme hot or cold temperatures on the skin.  As the block wears off, the feeling will return as a tingling or prickly " sensation near your surgical site.  You will experience more discomfort from your incision as the feeling returns.  You may want to take a pain pill (a narcotic or Tylenol if this was prescribed by your surgeon) when you start to experience mild pain before the pain beccomes more severe.  If your pain medications do not control your pain you should notifiy your surgeon.    Tips for taking pain medications  To get the best pain relief possible, remember these points:  Take pain medications as directed, before pain becomes severe.  Pain medication can upset your stomach: taking it with food may help.  Constipation is a common side effect of pain medication. Drink plenty of  fluids.  Eat foods high in fiber. Take a stool softener if recommended by your doctor or pharmacist.  Do not drink alcohol, drive or operate machinery while taking pain medications.  Ask about other ways to control pain, such as with heat, ice or relaxation.    Tylenol/Acetaminophen Consumption    If you feel your pain relief is insufficient, you may take Tylenol/Acetaminophen in addition to your narcotic pain medication.   Be careful not to exceed 4,000 mg of Tylenol/Acetaminophen in a 24 hour period from all sources.  If you are taking extra strength Tylenol/acetaminophen (500 mg), the maximum dose is 8 tablets in 24 hours.  If you are taking regular strength acetaminophen (325 mg), the maximum dose is 12 tablets in 24 hours.  Tylenol 975 mg given at 9:28 AM.   Ok to take more after 3:28 PM.      Call a doctor for any of the following:  Signs of infection (fever, growing tenderness at the surgery site, a large amount of drainage or bleeding, severe pain, foul-smelling drainage, redness, swelling).  It has been over 8 to 10 hours since surgery and you are still not able to urinate (pass water).  Headache for over 24 hours.  Numbness, tingling or weakness the day after surgery (if you had spinal anesthesia).  Signs of Covid-19 infection  (temperature over 100 degrees, shortness of breath, cough, loss of taste/smell, generalized body aches, persistent headache, chills, sore throat, nausea/vomiting/diarrhea)  Your doctor is:  Dr. Jeremie Dunn, General Surgery: [phone]                    Or dial [phone] and ask for the resident on call for:  General Surgery  For emergency care, call the:  [facility]:  [phone] (TTY for hearing impaired: [phone])

## 2024-05-17 NOTE — ANESTHESIA POSTPROCEDURE EVALUATION
Patient: Waylon Santiago    Procedure: Procedure(s):  EXCISION, MASS, BACK       Anesthesia Type:  General    Note:  Disposition: Outpatient   Postop Pain Control: Uneventful            Sign Out: Well controlled pain   PONV: No   Neuro/Psych: Uneventful            Sign Out: Acceptable/Baseline neuro status   Airway/Respiratory: Uneventful            Sign Out: Acceptable/Baseline resp. status   CV/Hemodynamics: Uneventful            Sign Out: Acceptable CV status; No obvious hypovolemia; No obvious fluid overload   Other NRE: NONE   DID A NON-ROUTINE EVENT OCCUR? No       Last vitals:  Vitals Value Taken Time   BP 91/65 05/17/24 1415   Temp 36.4  C (97.6  F) 05/17/24 1407   Pulse 89 05/17/24 1415   Resp 11 05/17/24 1415   SpO2 95 % 05/17/24 1415       Electronically Signed By: Wendy Ibarra MD  May 17, 2024  2:54 PM

## 2024-05-17 NOTE — OP NOTE
Operative Note    PreOp Dx: Soft tissue mass  PostOp Dx: Soft tissue mass  Procedure: Excision of soft tissue mass   Surgeon: Jeremie Dunn MD; Liss Reese MD   Anesethesia: General  EBL: 25 ml   Comorbidities:   Patient Active Problem List   Diagnosis    Essential hypertension    Hyperlipidemia LDL goal <100    Abnormal results of liver function studies    Gout    Preventive measure    Morbid obesity (H)    Lipoma of skin and subcutaneous tissue    Adhesive capsulitis of left shoulder    Lumbar radiculopathy    Hemorrhoids      Indications: Waylon Santiago is a 53 year old male  who presented with a 11 cm mass on his upper back. Excision is recommended. Risks, benefits and alternatives are discussed and the patient agrees to proceed as planned.   Procedure: The patient was brought to the operating room and placed in a comfortable supine position. All pressure points were padded. The region was prepped and draped in a sterile fashion.  The mass was readily identified. Local anesthesia was established with Bupivacaine and Lidocaine. A 8 cm incision was made with a scalpel over the area. Dissection was carried down to the mass with a combination of blunt and sharp dissection. The mass was sent to Pathology.  Hemostasis was assured. The wound was closed in layers. A sterile dressing was applied.  The patient tolerated the procedure well and was discharged from the recovery area in good condition.

## 2024-05-17 NOTE — ANESTHESIA PROCEDURE NOTES
Airway       Patient location during procedure: OR       Procedure Start/Stop Times: 5/17/2024 11:32 AM  Staff -        CRNA: Milena Bojorquez APRN CRNA       Performed By: CRNAIndications and Patient Condition       Indications for airway management: arianna-procedural       Induction type:intravenous       Mask difficulty assessment: 1 - vent by mask    Final Airway Details       Final airway type: endotracheal airway       Successful airway: ETT - single and Oral  Endotracheal Airway Details        ETT size (mm): 7.5       Cuffed: yes       Cuff volume (mL): 6       Successful intubation technique: direct laryngoscopy       DL Blade Type: MAC 3       Grade View of Cords: 2       Position: Right       Measured from: lips       Secured at (cm): 24    Post intubation assessment        Placement verified by: capnometry, equal breath sounds and chest rise        Number of attempts at approach: 1       Secured with: tape       Ease of procedure: easy       Dentition: Intact and Unchanged    Medication(s) Administered   Medication Administration Time: 5/17/2024 11:32 AM

## 2024-05-20 ENCOUNTER — PATIENT OUTREACH (OUTPATIENT)
Dept: SURGERY | Facility: CLINIC | Age: 54
End: 2024-05-20
Payer: COMMERCIAL

## 2024-05-20 NOTE — PROGRESS NOTES
RN Post-Op/Post-Discharge Care Coordination Note    Mr. aWylon Santiago is a 53 year old male who underwent mass removal from his back on 5/17 with  Dr. Jeremie Dunn.  Spoke with Patient.    Support  Patient able to care for self independently     Health Status  Drains (SANTIAGO): N/A  Fevers/chills: Patient denies any fever or chills.  Incisions: Patient denies any signs and symptoms of infection..  Wound closure:  Skin Sealant. Reports starting of a seroma. Encouraged applying local heat to the area and wearing some type of compression garment.   Pain: Soreness and is medicating with Tylenol and Ibuprofen PRN per package instructions    New Medications:  Tylenol, Ibuprofen, Oxycodone    Activity/Restrictions  No restrictions    Equipment  None    Pathology reviewed with patient:  No, not yet available    All of his questions were answered.  He will call this office if he has any further questions and/or concerns.      RTC PRN    A copy of this note was routed to the primary surgeon.      Whom and When to Call  Patient acknowledges understanding of how to manage any medication changes and   when to seek medical care.     Patient advised that if after hour medical concerns arise to please call [phone] and choose option 4 to speak to the physician on call.

## 2024-05-21 LAB
PATH REPORT.COMMENTS IMP SPEC: NORMAL
PATH REPORT.COMMENTS IMP SPEC: NORMAL
PATH REPORT.FINAL DX SPEC: NORMAL
PATH REPORT.GROSS SPEC: NORMAL
PATH REPORT.MICROSCOPIC SPEC OTHER STN: NORMAL
PATH REPORT.RELEVANT HX SPEC: NORMAL
PHOTO IMAGE: NORMAL

## 2024-05-24 ENCOUNTER — PATIENT OUTREACH (OUTPATIENT)
Dept: SURGERY | Facility: CLINIC | Age: 54
End: 2024-05-24
Payer: COMMERCIAL

## 2024-05-24 ENCOUNTER — OFFICE VISIT (OUTPATIENT)
Dept: SURGERY | Facility: CLINIC | Age: 54
End: 2024-05-24
Payer: COMMERCIAL

## 2024-05-24 ENCOUNTER — TELEPHONE (OUTPATIENT)
Dept: SURGERY | Facility: CLINIC | Age: 54
End: 2024-05-24

## 2024-05-24 VITALS
DIASTOLIC BLOOD PRESSURE: 79 MMHG | TEMPERATURE: 97.9 F | OXYGEN SATURATION: 96 % | HEIGHT: 67 IN | SYSTOLIC BLOOD PRESSURE: 118 MMHG | WEIGHT: 259 LBS | HEART RATE: 99 BPM | BODY MASS INDEX: 40.65 KG/M2

## 2024-05-24 DIAGNOSIS — L76.34 POSTPROCEDURAL SEROMA OF SKIN AND SUBCUTANEOUS TISSUE FOLLOWING OTHER PROCEDURE: Primary | ICD-10-CM

## 2024-05-24 PROCEDURE — 87070 CULTURE OTHR SPECIMN AEROBIC: CPT | Performed by: SURGERY

## 2024-05-24 PROCEDURE — 99024 POSTOP FOLLOW-UP VISIT: CPT | Performed by: SURGERY

## 2024-05-24 PROCEDURE — 99000 SPECIMEN HANDLING OFFICE-LAB: CPT | Performed by: PATHOLOGY

## 2024-05-24 PROCEDURE — 87075 CULTR BACTERIA EXCEPT BLOOD: CPT | Performed by: SURGERY

## 2024-05-24 PROCEDURE — 10140 I&D HMTMA SEROMA/FLUID COLLJ: CPT | Mod: 58 | Performed by: SURGERY

## 2024-05-24 ASSESSMENT — PAIN SCALES - GENERAL: PAINLEVEL: SEVERE PAIN (6)

## 2024-05-24 NOTE — NURSING NOTE
The following medication was given:     MEDICATION:  Lidocaine without epinephrine  ROUTE: SQ  SITE: Upper back  DOSE: 10 ml  LOT #: [id number]  : [employer]  EXPIRATION DATE: 1/26  NDC#: 20189-734-81   Was there drug waste? Yes  Amount of drug waste (mL): 15.  Reason for waste:  Single use vial  Multi-dose vial: Yes    Jerilyn Jasmine RN  May 24, 2024

## 2024-05-24 NOTE — TELEPHONE ENCOUNTER
[facility] Call Center    Phone Message    May a detailed message be left on voicemail: yes     Reason for Call: Other: Requesting a call back in regards to recent surgery. This is in regards to extreme swelling in area that was stitched up. Pt requesting to come in ASAP to drain some of the fluid.      Action Taken: Other: gen surg    Travel Screening: Not Applicable

## 2024-05-24 NOTE — LETTER
5/24/2024       RE: Waylon Santaigo  [id number] [facility] [address]     Dear Colleague,    Thank you for referring your patient, Waylon Santiago, to the [facility] [place] [facility]. Please see a copy of my visit note below.    General Surgery Clinic:    The patient is a 53 year old man who underwent an excision of an upper back  subcutaneous 11 cm mass on 5/17/2024. The patient has now developed a large fluid collection at the site of the excised mass.  The patient has significant pain at the site.  He denies fever and chills.  The patient is  interest to have the fluid collection drained.  He understand the risks and the benefits of fluid drainage.  The risks discussed included the risk of bleeding and infection.  The fluid was drained from ht surgical site without complications.      PAST MEDICAL HISTORY:  Past Medical History:   Diagnosis Date    Hyperlipidemia     Hypertension      PAST SURGICAL HISTORY:  Past Surgical History:   Procedure Laterality Date    EXCISE MASS BACK N/A 5/17/2024    Procedure: EXCISION, MASS, BACK;  Surgeon: Jeremie Dunn MD;  Location: [facility] [place] OR     MEDICATIONS:  Current Outpatient Medications   Medication Sig Dispense Refill    allopurinol (ZYLOPRIM) 300 MG tablet Take 1 tablet (300 mg) by mouth daily 90 tablet 3    amLODIPine (NORVASC) 10 MG tablet Take 1 tablet (10 mg) by mouth daily 90 tablet 3    aspirin 81 MG tablet Take 1 tablet (81 mg) by mouth daily 30 tablet 11    metFORMIN (GLUCOPHAGE) 500 MG tablet TAKE 2 TABLETS BY MOUTH 2 TIMES DAILY WITH MEALS Strength: 500 mg 360 tablet 3    olmesartan (BENICAR) 40 MG tablet Take 1 tablet (40 mg) by mouth daily 90 tablet 3    Omega-3 Fish Oil 500 MG capsule Take 1 capsule (500 mg) by mouth daily 90 capsule 1     No current facility-administered medications for this visit.     ALLERGIES:  No Known Allergies     SOCIAL HISTORY:  Social  History     Socioeconomic History    Marital status:      Spouse name: None    Number of children: None    Years of education: None    Highest education level: None   Tobacco Use    Smoking status: Never    Smokeless tobacco: Never   Vaping Use    Vaping status: Never Used   Substance and Sexual Activity    Alcohol use: Yes     Comment: occ.    Drug use: No    Sexual activity: Yes     Partners: Female   Other Topics Concern    Parent/sibling w/ CABG, MI or angioplasty before 65F 55M? No     Social Determinants of Health     Financial Resource Strain: Low Risk  (1/17/2024)    Financial Resource Strain     Within the past 12 months, have you or your family members you live with been unable to get utilities (heat, electricity) when it was really needed?: No   Food Insecurity: Low Risk  (1/17/2024)    Food Insecurity     Within the past 12 months, did you worry that your food would run out before you got money to buy more?: No     Within the past 12 months, did the food you bought just not last and you didn t have money to get more?: No   Transportation Needs: Low Risk  (1/17/2024)    Transportation Needs     Within the past 12 months, has lack of transportation kept you from medical appointments, getting your medicines, non-medical meetings or appointments, work, or from getting things that you need?: No   Interpersonal Safety: Low Risk  (1/17/2024)    Interpersonal Safety     Do you feel physically and emotionally safe where you currently live?: Yes     Within the past 12 months, have you been hit, slapped, kicked or otherwise physically hurt by someone?: No     Within the past 12 months, have you been humiliated or emotionally abused in other ways by your partner or ex-partner?: No   Housing Stability: Low Risk  (1/17/2024)    Housing Stability     Do you have housing? : Yes     Are you worried about losing your housing?: No     FAMILY HISTORY:  Family History   Problem Relation Age of Onset    Diabetes Mother  "    Cerebrovascular Disease Mother         minor stroke    Obesity Mother     Family History Negative Father         health hx unknown; smoker    Substance Abuse Father         alcoholism    Diabetes Maternal Grandmother     Obesity Maternal Grandmother     Thyroid Disease Other     Thyroid Disease Cousin      PHYSICAL EXAM:  Vital Signs: /79 (BP Location: Left arm, Patient Position: Chair, Cuff Size: Adult Large)   Pulse 99   Temp 97.9  F (36.6  C) (Oral)   Ht 1.702 m (5' 7\")   Wt 117.5 kg (259 lb)   SpO2 96%   BMI 40.57 kg/m    GEN: the patient is a wake and alert.  The patient appears in no apparent distress.    BACK: the incision is without drainage.  There is an approximately 11 cm fluid collection palpable at the sit of the lipoma excision.  There is mild erythema of the skin over the fluid collection.  The area is mildly tender.    EXT: warm and well perfused.     Procedure:    The area was prepped and draped.  The site was infiltrated with 1% lidocaine.  60 mol of a serosanguinous fluid.  A sample was submitted for Gram stain and culture.  A pressure dressing was applied.  The patient tolerated the procedure well.    A/P: The patient appears to have developed a seroma at the site of the lipoma excision.  This was drain without complications today. The patient tolerated the drainage procedure.  The patient will call or return if he develops symptoms associated with infection.  The  patient will follow up with his primary surgeon or the General Surgery clinic and the results of the Gram stain and the fluid culture results will be follow by the surgery clinic staff.        Again, thank you for allowing me to participate in the care of your patient.      Sincerely,    Roni Lisa MD    "

## 2024-05-24 NOTE — NURSING NOTE
"Chief Complaint   Patient presents with    RECHECK     Waylon, is being seen today for a post-op incision check left side of traps area, experiencing pain 5-6/10, swelling in the area DOS 5/17/24, as reported by patient.       Vitals:    05/24/24 1051   BP: 118/79   BP Location: Left arm   Patient Position: Chair   Cuff Size: Adult Large   Pulse: 99   Temp: 97.9  F (36.6  C)   TempSrc: Oral   SpO2: 96%   Weight: 117.5 kg (259 lb)   Height: 1.702 m (5' 7\")       Body mass index is 40.57 kg/m .      Lina Barragan LPN    "

## 2024-05-24 NOTE — NURSING NOTE
[facility] SURGERY [address] [address]  [phone]  Dept: [phone]  ______________________________________________________________________________    Patient: Waylon Santiago   : 1970   MRN: [id number]   May 24, 2024    INVASIVE PROCEDURE SAFETY CHECKLIST    Date: 2024   Procedure:Seroma  Patient Name: Waylon Santiago  MRN: [id number]  YOB: 1970    Action: Complete sections as appropriate. Any discrepancy results in a HARD COPY until resolved.     PRE PROCEDURE:  Patient ID verified with 2 identifiers (name and  or MRN): Yes  Procedure and site verified with patient/designee (when able): Yes  Accurate consent documentation in medical record: NA  H&P (or appropriate assessment) documented in medical record: NA  H&P must be up to 30 days prior to procedure and updates within 24 hours of procedure as applicable: NA  Relevant diagnostic and radiology test results appropriately labeled and displayed as applicable: NA  Blood products, implants, devices, and or special equipment available for the procedure as applicable: NA   Procedure site(s) marked with provider initials: No, (see below)  Marking not required: Yes  Procedure is in continuous attendance with the patient from consent through completion of procedure    TIMEOUT:  Time-Out performed immediately prior to starting procedure, including verbal and active participation of all team members addressing the following:Yes  * Correct patient identify  * Confirmed that the correct side and site are marked  * An accurate procedure consent form  * Agreement on the procedure to be done  * Correct patient position  * Relevant images and results are properly labeled and appropriately displayed  * The need to administer antibiotics or fluids for irrigation purposes during the procedure as applicable   * Safety precautions based on patient history or  medication use    DURING PROCEDURE: Verification of correct person, site, and procedures any time the responsibility for care of the patient is transferred to another member of the care team.

## 2024-05-24 NOTE — TELEPHONE ENCOUNTER
Called patient to discuss his concerns. He states his incision is very swollen and painful. He states it is warm but unable to assess if it is red. He doesn't think it is draining but also isn't sure of that either. Scheduled an appointment to see Dr. Lisa today for an incision check.

## 2024-05-25 NOTE — PROGRESS NOTES
General Surgery Clinic:    The patient is a 53 year old man who underwent an excision of an upper back  subcutaneous 11 cm mass on 5/17/2024. The patient has now developed a large fluid collection at the site of the excised mass.  The patient has significant pain at the site.  He denies fever and chills.  The patient is  interest to have the fluid collection drained.  He understand the risks and the benefits of fluid drainage.  The risks discussed included the risk of bleeding and infection.  The fluid was drained from ht surgical site without complications.      PAST MEDICAL HISTORY:  Past Medical History:   Diagnosis Date    Hyperlipidemia     Hypertension      PAST SURGICAL HISTORY:  Past Surgical History:   Procedure Laterality Date    EXCISE MASS BACK N/A 5/17/2024    Procedure: EXCISION, MASS, BACK;  Surgeon: Jeremie Dunn MD;  Location: [facility] OR     MEDICATIONS:  Current Outpatient Medications   Medication Sig Dispense Refill    allopurinol (ZYLOPRIM) 300 MG tablet Take 1 tablet (300 mg) by mouth daily 90 tablet 3    amLODIPine (NORVASC) 10 MG tablet Take 1 tablet (10 mg) by mouth daily 90 tablet 3    aspirin 81 MG tablet Take 1 tablet (81 mg) by mouth daily 30 tablet 11    metFORMIN (GLUCOPHAGE) 500 MG tablet TAKE 2 TABLETS BY MOUTH 2 TIMES DAILY WITH MEALS Strength: 500 mg 360 tablet 3    olmesartan (BENICAR) 40 MG tablet Take 1 tablet (40 mg) by mouth daily 90 tablet 3    Omega-3 Fish Oil 500 MG capsule Take 1 capsule (500 mg) by mouth daily 90 capsule 1     No current facility-administered medications for this visit.     ALLERGIES:  No Known Allergies     SOCIAL HISTORY:  Social History     Socioeconomic History    Marital status:      Spouse name: None    Number of children: None    Years of education: None    Highest education level: None   Tobacco Use    Smoking status: Never    Smokeless tobacco: Never   Vaping Use    Vaping status: Never Used   Substance and Sexual Activity    Alcohol use:  Yes     Comment: occ.    Drug use: No    Sexual activity: Yes     Partners: Female   Other Topics Concern    Parent/sibling w/ CABG, MI or angioplasty before 65F 55M? No     Social Determinants of Health     Financial Resource Strain: Low Risk  (1/17/2024)    Financial Resource Strain     Within the past 12 months, have you or your family members you live with been unable to get utilities (heat, electricity) when it was really needed?: No   Food Insecurity: Low Risk  (1/17/2024)    Food Insecurity     Within the past 12 months, did you worry that your food would run out before you got money to buy more?: No     Within the past 12 months, did the food you bought just not last and you didn t have money to get more?: No   Transportation Needs: Low Risk  (1/17/2024)    Transportation Needs     Within the past 12 months, has lack of transportation kept you from medical appointments, getting your medicines, non-medical meetings or appointments, work, or from getting things that you need?: No   Interpersonal Safety: Low Risk  (1/17/2024)    Interpersonal Safety     Do you feel physically and emotionally safe where you currently live?: Yes     Within the past 12 months, have you been hit, slapped, kicked or otherwise physically hurt by someone?: No     Within the past 12 months, have you been humiliated or emotionally abused in other ways by your partner or ex-partner?: No   Housing Stability: Low Risk  (1/17/2024)    Housing Stability     Do you have housing? : Yes     Are you worried about losing your housing?: No     FAMILY HISTORY:  Family History   Problem Relation Age of Onset    Diabetes Mother     Cerebrovascular Disease Mother         minor stroke    Obesity Mother     Family History Negative Father         health hx unknown; smoker    Substance Abuse Father         alcoholism    Diabetes Maternal Grandmother     Obesity Maternal Grandmother     Thyroid Disease Other     Thyroid Disease Cousin      PHYSICAL  "EXAM:  Vital Signs: /79 (BP Location: Left arm, Patient Position: Chair, Cuff Size: Adult Large)   Pulse 99   Temp 97.9  F (36.6  C) (Oral)   Ht 1.702 m (5' 7\")   Wt 117.5 kg (259 lb)   SpO2 96%   BMI 40.57 kg/m    GEN: the patient is a wake and alert.  The patient appears in no apparent distress.    BACK: the incision is without drainage.  There is an approximately 11 cm fluid collection palpable at the sit of the lipoma excision.  There is mild erythema of the skin over the fluid collection.  The area is mildly tender.    EXT: warm and well perfused.     Procedure:    The area was prepped and draped.  The site was infiltrated with 1% lidocaine.  60 mol of a serosanguinous fluid.  A sample was submitted for Gram stain and culture.  A pressure dressing was applied.  The patient tolerated the procedure well.    A/P: The patient appears to have developed a seroma at the site of the lipoma excision.  This was drain without complications today. The patient tolerated the drainage procedure.  The patient will call or return if he develops symptoms associated with infection.  The  patient will follow up with his primary surgeon or the General Surgery clinic and the results of the Gram stain and the fluid culture results will be follow by the surgery clinic staff.    Roni Lisa MD, PhD  "

## 2024-05-29 ENCOUNTER — PATIENT OUTREACH (OUTPATIENT)
Dept: SURGERY | Facility: CLINIC | Age: 54
End: 2024-05-29
Payer: COMMERCIAL

## 2024-05-29 DIAGNOSIS — L76.34 POSTPROCEDURAL SEROMA OF SKIN AND SUBCUTANEOUS TISSUE FOLLOWING OTHER PROCEDURE: Primary | ICD-10-CM

## 2024-05-29 NOTE — PROGRESS NOTES
Patient underwent excision of an upper back mass and he developed a seroma which was drained in the clinic. The patient is calling the clinic reporting that the seroma has recurred.  Per Dr. Lisa's plan, patient should go to IR for US drainage and instillation of Tetracycline or a sclerosing agent.    Patient is in agreement with the plan.

## 2024-05-30 DIAGNOSIS — L76.34 POSTPROCEDURAL SEROMA OF SKIN AND SUBCUTANEOUS TISSUE FOLLOWING OTHER PROCEDURE: ICD-10-CM

## 2024-05-30 DIAGNOSIS — L76.34 POSTPROCEDURAL SEROMA OF SKIN AND SUBCUTANEOUS TISSUE FOLLOWING OTHER PROCEDURE: Primary | ICD-10-CM

## 2024-05-30 NOTE — PROGRESS NOTES
Ir drainage of upper back seroma-needs Ultrasound  Received: Today  Poli Washburn Chi, PA-C Harmon, James Vail, MD; Jerilyn Jasmine RN; P Ir Biopsy  Hi Dr. Lisa and SURESH Jean-Baptiste received your request for IR drainage of upper back seroma.  Was it fully aspirated in clinic?  Looks like the fluid is prelim is growing out Gram positive bacilli, resembling diphtheroids.      We recommend US to better assess whether there is a residual fluid collection us to be able to aspirate or leave drain in place.    Please let us know when that is resulted and we will be happy to review.    Thank you,  Shara SIMONS

## 2024-05-30 NOTE — CONSULTS
"    Outpatient IR Referral  05/30/24    Referring Provider:  Roni Lisa MD   IR Referral Request:  \"IR US seroma drainage with instillation of Tetracycline or other sclerosing agent. Recurrent upper back seroma.\"  Recommendations/Plan:  US recommended.  IB message sent to Jerilyn Jasmine RN and Dr. Lisa.  Will review US once it is resulted for possible aspiration vs drainage catheter for sclerotherapy.      Brief History:    Waylon Santiago is a 53 year old male with history HTN, HLD and recent excision of upper back subcutaneous 11 cm lipoma mass on 5/17/2024 c/b large symptomatic fluid collection who underwent aspiration by Dr. Lisa on 5/24/2024 with removal of 60 mL of serosanguinous fluid.  Culture in progress today with gram positive bacilli resembling diphtheroids with possible anaerobic growth.     Anaerobic Isolated in broth only Cutibacterium (Propionibacterium) acnes Abnormal    Aerobic Isolated in broth only Anaerobic diphtheroids Abnormal      Pertinent Medications:    Current Outpatient Medications   Medication Sig Dispense Refill    allopurinol (ZYLOPRIM) 300 MG tablet Take 1 tablet (300 mg) by mouth daily 90 tablet 3    amLODIPine (NORVASC) 10 MG tablet Take 1 tablet (10 mg) by mouth daily 90 tablet 3    aspirin 81 MG tablet Take 1 tablet (81 mg) by mouth daily 30 tablet 11    metFORMIN (GLUCOPHAGE) 500 MG tablet TAKE 2 TABLETS BY MOUTH 2 TIMES DAILY WITH MEALS Strength: 500 mg 360 tablet 3    olmesartan (BENICAR) 40 MG tablet Take 1 tablet (40 mg) by mouth daily 90 tablet 3    Omega-3 Fish Oil 500 MG capsule Take 1 capsule (500 mg) by mouth daily 90 capsule 1     No current facility-administered medications for this visit.     Pertinent Imaging Reviewed:    No relevant imaging recently    US 5/31/24 Findings:   Focus grayscale sonographic evaluation of the left upper back at area  of palpable concern. There is a well-circumscribed avascular primarily  anechoic lesion with internal " "septations measuring approximately 10.0  x 2.9 x 8.8 cm. This lesion appears to be in the subcutaneous fat  superficial to the musculature of the upper back.                                                                      Impression: Sonographic findings suggestive of seroma measuring up to  10.0 cm in the left upper back at area of concern.    Most Recent Labs:  Lab Results   Component Value Date    WBC 7.5 01/17/2024    WBC 7.1 08/20/2020     Lab Results   Component Value Date    RBC 5.00 01/17/2024    RBC 4.43 08/20/2020     Lab Results   Component Value Date    HGB 15.2 05/01/2024    HGB 14.4 08/20/2020     Lab Results   Component Value Date    HCT 47.4 01/17/2024    HCT 43.0 08/20/2020     Lab Results   Component Value Date     01/17/2024     08/20/2020    Last Comprehensive Metabolic Panel:  Lab Results   Component Value Date     05/01/2024    POTASSIUM 4.4 05/01/2024    CHLORIDE 102 05/01/2024    CO2 25 05/01/2024    ANIONGAP 13 05/01/2024    GLC 84 05/01/2024    BUN 14.7 05/01/2024    CR 0.97 05/01/2024    GFRESTIMATED >90 05/01/2024    AUBREE 9.4 05/01/2024      No results found for: \"INR\"         Shara Washburn PA-C  Interventional Radiology   [phone] (IR RN triage)    Addendum 5/31/24    Case and images reviewed with Dr. Arambula and Dr. Knowles who approve abscess drain placement. Referring team should manage antibiotic treatment for positive cultures. IR does not sclerose infected fluid collections.     IR drain placement orders entered. IR asks referring team to update patient on IR recommendations for scheduling.       Donita DIEGO DNP  Interventional Radiology     "

## 2024-05-30 NOTE — PROGRESS NOTES
"    Outpatient IR Referral  05/30/24    Referring Provider:  Roni Lisa MD   IR Referral Request:  \"IR US seroma drainage with instillation of Tetracycline or other sclerosing agent. Recurrent upper back seroma.\"  Recommendations/Plan:  ***.  See *** series ***image    Surg path entered under referrer.    Case and imaging was reviewed with {IR provider 2023:427985}.  IR recommendations also relayed Epic messaging.    IR referral converted to procedure order.      Brief History:    Waylon Santiago is a 53 year old male with history HTN, HLD and recent excision of upper back subcutaneous 11 cm lipoma mass on 5/17/2024 c/b large symptomatic fluid collection who underwent aspiration by Dr. Lisa on 5/24/2024 with removal of 60 mL of serosanguinous fluid.  Culture in progress today with gram positive bacilli resembling diphtheroids with possible anaerobic growth.      Pertinent Medications:    Current Outpatient Medications   Medication Sig Dispense Refill    allopurinol (ZYLOPRIM) 300 MG tablet Take 1 tablet (300 mg) by mouth daily 90 tablet 3    amLODIPine (NORVASC) 10 MG tablet Take 1 tablet (10 mg) by mouth daily 90 tablet 3    aspirin 81 MG tablet Take 1 tablet (81 mg) by mouth daily 30 tablet 11    metFORMIN (GLUCOPHAGE) 500 MG tablet TAKE 2 TABLETS BY MOUTH 2 TIMES DAILY WITH MEALS Strength: 500 mg 360 tablet 3    olmesartan (BENICAR) 40 MG tablet Take 1 tablet (40 mg) by mouth daily 90 tablet 3    Omega-3 Fish Oil 500 MG capsule Take 1 capsule (500 mg) by mouth daily 90 capsule 1     No current facility-administered medications for this visit.     Pertinent Imaging Reviewed:    ***    Most Recent Labs:  Lab Results   Component Value Date    WBC 7.5 01/17/2024    WBC 7.1 08/20/2020     Lab Results   Component Value Date    RBC 5.00 01/17/2024    RBC 4.43 08/20/2020     Lab Results   Component Value Date    HGB 15.2 05/01/2024    HGB 14.4 08/20/2020     Lab Results   Component Value Date    HCT 47.4 " "01/17/2024    HCT 43.0 08/20/2020     Lab Results   Component Value Date     01/17/2024     08/20/2020    Last Comprehensive Metabolic Panel:  Lab Results   Component Value Date     05/01/2024    POTASSIUM 4.4 05/01/2024    CHLORIDE 102 05/01/2024    CO2 25 05/01/2024    ANIONGAP 13 05/01/2024    GLC 84 05/01/2024    BUN 14.7 05/01/2024    CR 0.97 05/01/2024    GFRESTIMATED >90 05/01/2024    AUBREE 9.4 05/01/2024      No results found for: \"INR\"       If requesting team would like sample sent for anything else please use the IR order set \"IR RAD Biopsy or Fluid Aspirate Specimens\" to select your necessary diagnostic labs and pend for admission.     If there are labs you desire that are not found in this order set or you have questions regarding specific diagnostic labs please call the associated lab personnel.     Shara Washburn PA-C  Interventional Radiology   [phone] (IR RN triage)    "

## 2024-05-31 ENCOUNTER — OFFICE VISIT (OUTPATIENT)
Dept: SURGERY | Facility: CLINIC | Age: 54
End: 2024-05-31
Payer: COMMERCIAL

## 2024-05-31 ENCOUNTER — ANCILLARY PROCEDURE (OUTPATIENT)
Dept: ULTRASOUND IMAGING | Facility: CLINIC | Age: 54
End: 2024-05-31
Attending: SURGERY
Payer: COMMERCIAL

## 2024-05-31 VITALS
HEIGHT: 67 IN | DIASTOLIC BLOOD PRESSURE: 83 MMHG | OXYGEN SATURATION: 97 % | WEIGHT: 260.1 LBS | HEART RATE: 94 BPM | BODY MASS INDEX: 40.82 KG/M2 | SYSTOLIC BLOOD PRESSURE: 124 MMHG

## 2024-05-31 DIAGNOSIS — L76.34 POSTPROCEDURAL SEROMA OF SKIN AND SUBCUTANEOUS TISSUE FOLLOWING OTHER PROCEDURE: ICD-10-CM

## 2024-05-31 DIAGNOSIS — D17.30 LIPOMA OF SKIN AND SUBCUTANEOUS TISSUE: Primary | ICD-10-CM

## 2024-05-31 LAB
BACTERIA ASPIRATE CULT: ABNORMAL
BACTERIA ASPIRATE CULT: ABNORMAL
GRAM STAIN RESULT: ABNORMAL
GRAM STAIN RESULT: ABNORMAL

## 2024-05-31 PROCEDURE — 10160 PNXR ASPIR ABSC HMTMA BULLA: CPT | Mod: 58 | Performed by: STUDENT IN AN ORGANIZED HEALTH CARE EDUCATION/TRAINING PROGRAM

## 2024-05-31 PROCEDURE — 76604 US EXAM CHEST: CPT | Mod: 52 | Performed by: RADIOLOGY

## 2024-05-31 ASSESSMENT — PAIN SCALES - GENERAL: PAINLEVEL: NO PAIN (0)

## 2024-05-31 NOTE — LETTER
"5/31/2024       RE: Waylon Santiago  [address] [address] [facility] [address]       Dear Colleague,    Thank you for referring your patient, Waylon Santiago, to the [facility] GENERAL SURGERY CLINIC [place] at [facility]. Please see a copy of my visit note below.    H&P  The patient is a 52 yo M who presents for evaluation of a post-operative seroma. He underwent mass excision with Dr. Lisa on 5/17 and has since developed a seroma/fluid collection in the operative site which has required drainage. It has reaccumulated and so today he presents for additional drainage and referral to IR for injection of sclerosing agent.        Expand All Collapse All         Chief Complaint   Patient presents with    Post-Op - General Surgery       Recurring seroma         Vitals[]Expand by Default       Vitals:     05/31/24 0718   BP: 124/83   BP Location: Left arm   Patient Position: Sitting   Cuff Size: Adult Large   Pulse: 94   SpO2: 97%   Weight: 118 kg (260 lb 1.6 oz)   Height: 1.702 m (5' 7\")            Body mass index is 40.74 kg/m .        Exam  On exam it is tense, uncomfortable, but he denies associated fevers, chills, drainage, or overlying skin changes. He has had an ultrasound which confirms the fluid collection.    A&P  This is a 52 yo M with a recurrent seroma.  We will perform initial drainage in clinic today and place referral to IR for additional drainage and instillation of sclerosing agent as needed.    Brief Procedure  The area over the seroma was prepped with chlorhexidine. Local anesthetic was instilled. A large gauge needle was inserted into the fluid collection and 110 mL of dark serous fluid was aspirated until the seroma was completely collapsed. A simple gauze dressing was placed over the drainage site.          Again, thank you for allowing me to participate in the care of your patient.      Sincerely,    Maynor Davidson MD    "

## 2024-05-31 NOTE — PATIENT INSTRUCTIONS
You met with Dr. Maynor Davidson.      Today's visit instructions:    Return to the Surgery Clinic on an as needed basis.  Follow with Interventional Radiology for sclerosing of the seroma.     If you have questions please contact Jerilyn RN or Yoselyn RN during regular clinic hours, Monday through Friday 7:30 AM - 4:00 PM, or you can contact us via [employer] at anytime.       If you have urgent needs after-hours, weekends, or holidays please call the hospital at [phone] and ask to speak with our on-call General Surgery Team.    Appointment [phone]  Nurse Advice (Jerilyn or Yoselyn): [phone]   Surgery Scheduler (Santa): [phone]  Fax: [phone]

## 2024-05-31 NOTE — NURSING NOTE
"Chief Complaint   Patient presents with    Post-Op - General Surgery     Recurring seroma       Vitals:    05/31/24 0718   BP: 124/83   BP Location: Left arm   Patient Position: Sitting   Cuff Size: Adult Large   Pulse: 94   SpO2: 97%   Weight: 118 kg (260 lb 1.6 oz)   Height: 1.702 m (5' 7\")       Body mass index is 40.74 kg/m .                          Boris Tejada, EMT    "

## 2024-06-10 NOTE — PROGRESS NOTES
"H&P  The patient is a 52 yo M who presents for evaluation of a post-operative seroma. He underwent mass excision with Dr. Lisa on 5/17 and has since developed a seroma/fluid collection in the operative site which has required drainage. It has reaccumulated and so today he presents for additional drainage and referral to IR for injection of sclerosing agent.        Expand All Collapse All         Chief Complaint   Patient presents with    Post-Op - General Surgery       Recurring seroma         Vitals[]Expand by Default       Vitals:     05/31/24 0718   BP: 124/83   BP Location: Left arm   Patient Position: Sitting   Cuff Size: Adult Large   Pulse: 94   SpO2: 97%   Weight: 118 kg (260 lb 1.6 oz)   Height: 1.702 m (5' 7\")            Body mass index is 40.74 kg/m .        Exam  On exam it is tense, uncomfortable, but he denies associated fevers, chills, drainage, or overlying skin changes. He has had an ultrasound which confirms the fluid collection.    A&P  This is a 52 yo M with a recurrent seroma.  We will perform initial drainage in clinic today and place referral to IR for additional drainage and instillation of sclerosing agent as needed.    Brief Procedure  The area over the seroma was prepped with chlorhexidine. Local anesthetic was instilled. A large gauge needle was inserted into the fluid collection and 110 mL of dark serous fluid was aspirated until the seroma was completely collapsed. A simple gauze dressing was placed over the drainage site.    Maynor Davidson MD  General Surgery  "

## 2024-07-12 ENCOUNTER — OFFICE VISIT (OUTPATIENT)
Dept: SURGERY | Facility: CLINIC | Age: 54
End: 2024-07-12
Payer: COMMERCIAL

## 2024-07-12 VITALS
OXYGEN SATURATION: 96 % | BODY MASS INDEX: 40.34 KG/M2 | WEIGHT: 257 LBS | DIASTOLIC BLOOD PRESSURE: 85 MMHG | SYSTOLIC BLOOD PRESSURE: 135 MMHG | HEART RATE: 86 BPM | HEIGHT: 67 IN

## 2024-07-12 DIAGNOSIS — L76.34 POSTPROCEDURAL SEROMA OF SKIN AND SUBCUTANEOUS TISSUE FOLLOWING OTHER PROCEDURE: Primary | ICD-10-CM

## 2024-07-12 PROCEDURE — 99024 POSTOP FOLLOW-UP VISIT: CPT | Performed by: SURGERY

## 2024-07-12 ASSESSMENT — PAIN SCALES - GENERAL: PAINLEVEL: MILD PAIN (2)

## 2024-07-12 NOTE — NURSING NOTE
"Chief Complaint   Patient presents with    RECHECK     Waylon, is being seen today for a PO, evaluate incision site/seroma, per pt request.       Vitals:    07/12/24 0914   BP: 135/85   BP Location: Left arm   Patient Position: Chair   Cuff Size: Adult Large   Pulse: 86   SpO2: 96%   Weight: 116.6 kg (257 lb)   Height: 1.702 m (5' 7\")       Body mass index is 40.25 kg/m .      Lina Barragan LPN    "

## 2024-07-12 NOTE — LETTER
7/12/2024       RE: Waylon Santiago  [address] [address] [facility] [address]     Dear Colleague,    Thank you for referring your patient, Waylon Santiago, to the [facility] GENERAL SURGERY CLINIC [place] at [facility]. Please see a copy of my visit note below.    Surgery Clinic Outpatient Progress Note  July 12, 2024  Waylon Santiago  [id number]    S: Waylon Santiago is a 53 year old male who presents to the clinic in follow-up of seroma aspiration after lipoma excision. The patient is eating normally and he denies any bowel complaints such as nausea, constipation or diarrhea. The patient states his pain is under control.  ROS: As above, and he denies any new complaints.  O:  257 lbs 0 oz   Pulse:  [86] 86  BP: (135)/(85) 135/85  SpO2:  [96 %] 96 %  Drain: The patient does not have any drains or tubes we are monitoring.  Physical Exam:  Gen: Alert, oriented, no distress  Psych: Normal affect  Neuro: No focal deficit  Resp: Quiet breathing  CV: Warm and well perfused.  Abd: soft  Ext: No obvious deformities, L posterior shoulder with incisional scar, c/d/I, no erythema, swelling or drainage    A/P:   Waylon Santiago is a 53 year old male s/p seroma aspiration after lipoma excision    -no further intervention required given resolution of seroma  -will cancel IR referral  -follow-up prn    Total time 25 mins, the majority of which was spent in counseling.  Jeremie Dunn MD

## 2024-07-12 NOTE — PROGRESS NOTES
Surgery Clinic Outpatient Progress Note  July 12, 2024  Waylon Santiago  [id number]    S: Waylon Santiago is a 53 year old male who presents to the clinic in follow-up of seroma aspiration after lipoma excision. The patient is eating normally and he denies any bowel complaints such as nausea, constipation or diarrhea. The patient states his pain is under control.  ROS: As above, and he denies any new complaints.  O:  257 lbs 0 oz   Pulse:  [86] 86  BP: (135)/(85) 135/85  SpO2:  [96 %] 96 %  Drain: The patient does not have any drains or tubes we are monitoring.  Physical Exam:  Gen: Alert, oriented, no distress  Psych: Normal affect  Neuro: No focal deficit  Resp: Quiet breathing  CV: Warm and well perfused.  Abd: soft  Ext: No obvious deformities, L posterior shoulder with incisional scar, c/d/I, no erythema, swelling or drainage    A/P:   Waylon Santiago is a 53 year old male s/p seroma aspiration after lipoma excision    -no further intervention required given resolution of seroma  -will cancel IR referral  -follow-up prn    Total time 25 mins, the majority of which was spent in counseling.  Jeremie Dunn MD

## 2024-07-12 NOTE — PATIENT INSTRUCTIONS
You met with Dr. Jeremie Dunn.      Today's visit instructions:    Return to the Surgery Clinic on an as needed basis. The referral to Interventional Radiology has been cancelled.     If you have questions please contact Jerilyn RN or Yoselyn RN during regular clinic hours, Monday through Friday 7:30 AM - 4:00 PM, or you can contact us via [employer] at anytime.       If you have urgent needs after-hours, weekends, or holidays please call the hospital at [phone] and ask to speak with our on-call General Surgery Team.    Appointment [phone]  Nurse Advice (Jerilyn or Yoselyn): [phone]   Surgery Scheduler ([place]): [phone]  Fax: [phone]

## 2025-02-04 DIAGNOSIS — I10 ESSENTIAL HYPERTENSION: ICD-10-CM

## 2025-02-04 RX ORDER — AMLODIPINE BESYLATE 10 MG/1
10 TABLET ORAL DAILY
Qty: 90 TABLET | Refills: 0 | Status: SHIPPED | OUTPATIENT
Start: 2025-02-04

## 2025-03-02 ENCOUNTER — HEALTH MAINTENANCE LETTER (OUTPATIENT)
Age: 55
End: 2025-03-02

## 2025-03-07 DIAGNOSIS — R73.03 PREDIABETES: ICD-10-CM

## 2025-03-07 DIAGNOSIS — M1A.0790 IDIOPATHIC CHRONIC GOUT OF FOOT WITHOUT TOPHUS, UNSPECIFIED LATERALITY: ICD-10-CM

## 2025-03-07 DIAGNOSIS — I10 ESSENTIAL HYPERTENSION: ICD-10-CM

## 2025-03-07 RX ORDER — OLMESARTAN MEDOXOMIL 40 MG/1
40 TABLET ORAL DAILY
Qty: 90 TABLET | Refills: 0 | Status: SHIPPED | OUTPATIENT
Start: 2025-03-07

## 2025-03-09 RX ORDER — ALLOPURINOL 300 MG/1
1 TABLET ORAL DAILY
Qty: 30 TABLET | Refills: 0 | Status: SHIPPED | OUTPATIENT
Start: 2025-03-09

## 2025-03-09 NOTE — TELEPHONE ENCOUNTER
Short Refill done. Future refills after physical due at this time.     Please call the patient and schedule Annual physical with me, which patient is due at this time, to further take care of the medical conditions and medications. OK to use same day slot / 8 AM on Tuesdays/Fridays in 1-2 weeks.     ( FYI - Pt doesn't respond [employer] messages - please call instead of sending [employer] message for scheduling )     Thank you,  Lea Arreola MD on 3/9/2025

## 2025-03-11 ENCOUNTER — TELEPHONE (OUTPATIENT)
Dept: FAMILY MEDICINE | Facility: CLINIC | Age: 55
End: 2025-03-11
Payer: COMMERCIAL

## 2025-03-11 NOTE — TELEPHONE ENCOUNTER
LVM TCB - Need to schedule annual Px with Dr. Arreola. Last Px 1/17/24.   _____________________________    Refills done on the following Rx's (30-day supply)    allopurinol (ZYLOPRIM) 300 MG tablet  30 tablets (1x/daily)    olmesartan (BENICAR) 40 MG tablet  90 tablets (1x/daily)    metFORMIN (GLUCOPHAGE) 500 MG tablet  60 tablet (2x/daily)    Message from PCP:  Short Refill done. Future refills after physical due at this time.      Please call the patient and schedule Annual physical with me, which patient is due at this time, to further take care of the medical conditions and medications. OK to use same day slot / 8 AM on Tuesdays/Fridays in 1-2 weeks.     ( FYI - Pt doesn't respond [employer] messages - please call instead of sending mychart message for scheduling )      Thank you, Lea Arreola MD on 3/9/2025   _____________________________    Keren Velazco on 3/11/2025 at 4:45 PM

## 2025-03-26 DIAGNOSIS — M1A.0790 IDIOPATHIC CHRONIC GOUT OF FOOT WITHOUT TOPHUS, UNSPECIFIED LATERALITY: ICD-10-CM

## 2025-03-26 NOTE — TELEPHONE ENCOUNTER
Medication Question or Refill    Contacts       Contact Date/Time Type Contact Phone/Fax    03/26/2025 03:13 PM CDT Phone (Incoming) Waylon Santiago (Self) [phone] (M)            What medication are you calling about (include dose and sig)?: Allopurinol    Preferred Pharmacy:       [facility]/pharmacy #[id number] [place], [place] - [address] [address]  Phone: [phone] Fax: [phone]      Controlled Substance Agreement on file:   CSA -- Patient Level:    CSA: None found at the patient level.       Who prescribed the medication?: Dr. Arreola    Do you need a refill? Yes    When did you use the medication last? 03/26/25    Patient offered an appointment? Yes: 05/07/25    Do you have any questions or concerns?  No      Could we send this information to you in [employer] or would you prefer to receive a phone call?:   Patient would like to be contacted via [employer]

## 2025-03-28 RX ORDER — ALLOPURINOL 300 MG/1
1 TABLET ORAL DAILY
Qty: 30 TABLET | Refills: 1 | Status: SHIPPED | OUTPATIENT
Start: 2025-03-27

## 2025-05-05 DIAGNOSIS — I10 ESSENTIAL HYPERTENSION: ICD-10-CM

## 2025-05-05 RX ORDER — AMLODIPINE BESYLATE 10 MG/1
10 TABLET ORAL DAILY
Qty: 30 TABLET | Refills: 0 | Status: SHIPPED | OUTPATIENT
Start: 2025-05-05 | End: 2025-05-08

## 2025-05-05 SDOH — HEALTH STABILITY: PHYSICAL HEALTH: ON AVERAGE, HOW MANY DAYS PER WEEK DO YOU ENGAGE IN MODERATE TO STRENUOUS EXERCISE (LIKE A BRISK WALK)?: 1 DAY

## 2025-05-05 SDOH — HEALTH STABILITY: PHYSICAL HEALTH: ON AVERAGE, HOW MANY MINUTES DO YOU ENGAGE IN EXERCISE AT THIS LEVEL?: 60 MIN

## 2025-05-05 ASSESSMENT — SOCIAL DETERMINANTS OF HEALTH (SDOH): HOW OFTEN DO YOU GET TOGETHER WITH FRIENDS OR RELATIVES?: NEVER

## 2025-05-06 NOTE — TELEPHONE ENCOUNTER
Multiple isabella Refills done.    Pt to keep up scheduled Physical on 5/8/2025 due since 1/2025.     Thank you,  Lea Arreola MD on 5/5/2025

## 2025-05-08 ENCOUNTER — OFFICE VISIT (OUTPATIENT)
Dept: FAMILY MEDICINE | Facility: CLINIC | Age: 55
End: 2025-05-08
Payer: COMMERCIAL

## 2025-05-08 VITALS
HEART RATE: 100 BPM | TEMPERATURE: 98.2 F | BODY MASS INDEX: 41.78 KG/M2 | WEIGHT: 260 LBS | SYSTOLIC BLOOD PRESSURE: 134 MMHG | OXYGEN SATURATION: 98 % | RESPIRATION RATE: 12 BRPM | DIASTOLIC BLOOD PRESSURE: 78 MMHG | HEIGHT: 66 IN

## 2025-05-08 DIAGNOSIS — E78.5 HYPERLIPIDEMIA LDL GOAL <100: ICD-10-CM

## 2025-05-08 DIAGNOSIS — R73.03 PREDIABETES: ICD-10-CM

## 2025-05-08 DIAGNOSIS — I10 ESSENTIAL HYPERTENSION: ICD-10-CM

## 2025-05-08 DIAGNOSIS — Z23 NEED FOR VACCINATION: ICD-10-CM

## 2025-05-08 DIAGNOSIS — E66.01 MORBID OBESITY (H): ICD-10-CM

## 2025-05-08 DIAGNOSIS — M1A.9XX0 CHRONIC GOUT WITHOUT TOPHUS, UNSPECIFIED CAUSE, UNSPECIFIED SITE: ICD-10-CM

## 2025-05-08 DIAGNOSIS — Z12.5 ENCOUNTER FOR PROSTATE CANCER SCREENING: ICD-10-CM

## 2025-05-08 DIAGNOSIS — Z00.00 ROUTINE GENERAL MEDICAL EXAMINATION AT A HEALTH CARE FACILITY: Primary | ICD-10-CM

## 2025-05-08 PROBLEM — K64.9 HEMORRHOIDS: Status: RESOLVED | Noted: 2023-11-07 | Resolved: 2025-05-08

## 2025-05-08 PROBLEM — M75.02 ADHESIVE CAPSULITIS OF LEFT SHOULDER: Status: RESOLVED | Noted: 2020-08-18 | Resolved: 2025-05-08

## 2025-05-08 PROBLEM — D17.30 LIPOMA OF SKIN AND SUBCUTANEOUS TISSUE: Status: RESOLVED | Noted: 2018-02-27 | Resolved: 2025-05-08

## 2025-05-08 LAB
ALBUMIN SERPL BCG-MCNC: 4.7 G/DL (ref 3.5–5.2)
ALP SERPL-CCNC: 111 U/L (ref 40–150)
ALT SERPL W P-5'-P-CCNC: 97 U/L (ref 0–70)
ANION GAP SERPL CALCULATED.3IONS-SCNC: 11 MMOL/L (ref 7–15)
AST SERPL W P-5'-P-CCNC: 44 U/L (ref 0–45)
BILIRUB SERPL-MCNC: 0.4 MG/DL
BUN SERPL-MCNC: 17.2 MG/DL (ref 6–20)
CALCIUM SERPL-MCNC: 9.5 MG/DL (ref 8.8–10.4)
CHLORIDE SERPL-SCNC: 103 MMOL/L (ref 98–107)
CHOLEST SERPL-MCNC: 155 MG/DL
CREAT SERPL-MCNC: 0.9 MG/DL (ref 0.67–1.17)
EGFRCR SERPLBLD CKD-EPI 2021: >90 ML/MIN/1.73M2
ERYTHROCYTE [DISTWIDTH] IN BLOOD BY AUTOMATED COUNT: 12.3 % (ref 10–15)
EST. AVERAGE GLUCOSE BLD GHB EST-MCNC: 100 MG/DL
FASTING STATUS PATIENT QL REPORTED: ABNORMAL
FASTING STATUS PATIENT QL REPORTED: ABNORMAL
GLUCOSE SERPL-MCNC: 92 MG/DL (ref 70–99)
HBA1C MFR BLD: 5.1 % (ref 0–5.6)
HCO3 SERPL-SCNC: 24 MMOL/L (ref 22–29)
HCT VFR BLD AUTO: 44.1 % (ref 40–53)
HDLC SERPL-MCNC: 23 MG/DL
HGB BLD-MCNC: 15.5 G/DL (ref 13.3–17.7)
LDLC SERPL CALC-MCNC: 101 MG/DL
MCH RBC QN AUTO: 33.5 PG (ref 26.5–33)
MCHC RBC AUTO-ENTMCNC: 35.1 G/DL (ref 31.5–36.5)
MCV RBC AUTO: 95 FL (ref 78–100)
NONHDLC SERPL-MCNC: 132 MG/DL
PLATELET # BLD AUTO: 215 10E3/UL (ref 150–450)
POTASSIUM SERPL-SCNC: 4.5 MMOL/L (ref 3.4–5.3)
PROT SERPL-MCNC: 7.3 G/DL (ref 6.4–8.3)
PSA SERPL DL<=0.01 NG/ML-MCNC: 1.79 NG/ML (ref 0–3.5)
RBC # BLD AUTO: 4.63 10E6/UL (ref 4.4–5.9)
SODIUM SERPL-SCNC: 138 MMOL/L (ref 135–145)
TRIGL SERPL-MCNC: 155 MG/DL
TSH SERPL DL<=0.005 MIU/L-ACNC: 2.98 UIU/ML (ref 0.3–4.2)
URATE SERPL-MCNC: 5.5 MG/DL (ref 3.4–7)
WBC # BLD AUTO: 7.1 10E3/UL (ref 4–11)

## 2025-05-08 RX ORDER — OLMESARTAN MEDOXOMIL 40 MG/1
40 TABLET ORAL DAILY
Qty: 90 TABLET | Refills: 3 | Status: SHIPPED | OUTPATIENT
Start: 2025-05-08

## 2025-05-08 RX ORDER — AMLODIPINE BESYLATE 10 MG/1
10 TABLET ORAL DAILY
Qty: 90 TABLET | Refills: 3 | Status: SHIPPED | OUTPATIENT
Start: 2025-05-08

## 2025-05-08 ASSESSMENT — PAIN SCALES - GENERAL: PAINLEVEL_OUTOF10: NO PAIN (0)

## 2025-05-08 NOTE — PATIENT INSTRUCTIONS
As discussed, please do fasting labs placed - fasting LAB only appt.     Refills will be taken care     ======================    Patient Education   Preventive Care Advice   This is general advice given by our system to help you stay healthy. However, your care team may have specific advice just for you. Please talk to your care team about your preventive care needs.  Nutrition  Eat 5 or more servings of fruits and vegetables each day.  Try wheat bread, brown rice and whole grain pasta (instead of white bread, rice, and pasta).  Get enough calcium and vitamin D. Check the label on foods and aim for 100% of the RDA (recommended daily allowance).  Lifestyle  Exercise at least 150 minutes each week  (30 minutes a day, 5 days a week).  Do muscle strengthening activities 2 days a week. These help control your weight and prevent disease.  No smoking.  Wear sunscreen to prevent skin cancer.  Have a dental exam and cleaning every 6 months.  Yearly exams  See your health care team every year to talk about:  Any changes in your health.  Any medicines your care team has prescribed.  Preventive care, family planning, and ways to prevent chronic diseases.  Shots (vaccines)   HPV shots (up to age 26), if you've never had them before.  Hepatitis B shots (up to age 59), if you've never had them before.  COVID-19 shot: Get this shot when it's due.  Flu shot: Get a flu shot every year.  Tetanus shot: Get a tetanus shot every 10 years.  Pneumococcal, hepatitis A, and RSV shots: Ask your care team if you need these based on your risk.  Shingles shot (for age 50 and up)  General health tests  Diabetes screening:  Starting at age 35, Get screened for diabetes at least every 3 years.  If you are younger than age 35, ask your care team if you should be screened for diabetes.  Cholesterol test: At age 39, start having a cholesterol test every 5 years, or more often if advised.  Bone density scan (DEXA): At age 50, ask your care team if you  should have this scan for osteoporosis (brittle bones).  Hepatitis C: Get tested at least once in your life.  STIs (sexually transmitted infections)  Before age 24: Ask your care team if you should be screened for STIs.  After age 24: Get screened for STIs if you're at risk. You are at risk for STIs (including HIV) if:  You are sexually active with more than one person.  You don't use condoms every time.  You or a partner was diagnosed with a sexually transmitted infection.  If you are at risk for HIV, ask about PrEP medicine to prevent HIV.  Get tested for HIV at least once in your life, whether you are at risk for HIV or not.  Cancer screening tests  Cervical cancer screening: If you have a cervix, begin getting regular cervical cancer screening tests starting at age 21.  Breast cancer scan (mammogram): If you've ever had breasts, begin having regular mammograms starting at age 40. This is a scan to check for breast cancer.  Colon cancer screening: It is important to start screening for colon cancer at age 45.  Have a colonoscopy test every 10 years (or more often if you're at risk) Or, ask your provider about stool tests like a FIT test every year or Cologuard test every 3 years.  To learn more about your testing options, visit:   .  For help making a decision, visit:   https://bit.ly/wf19911.  Prostate cancer screening test: If you have a prostate, ask your care team if a prostate cancer screening test (PSA) at age 55 is right for you.  Lung cancer screening: If you are a current or former smoker ages 50 to 80, ask your care team if ongoing lung cancer screenings are right for you.  For informational purposes only. Not to replace the advice of your health care provider. Copyright   2023 [employer]. All rights reserved. Clinically reviewed by the [facility] Program. [employer] [id number] - REV 01/24.  Relationships for Good Health  Relationships are important for our health and  happiness. Social isolation, loneliness and lack of support are bad for your health. Studies show that loneliness can harm health and limit your life span as much as high blood pressure and smoking.   Take some time to reflect on your relationships. Then answer these questions:  Are there people in your life that cause you stress or drain your energy? What can you do to set limits?  ________________________________________________________________________________________________________________________________________________________________________________________________________________________________________________________________________________________________________________________________________________  Who do you enjoy spending time with? Who can you go to for support?  ________________________________________________________________________________________________________________________________________________________________________________________________________________________________________________________________________________________________________________________________________________  What can you do to improve your relationships with others?  __________________________________________________________________________________________________________________________________________________________________________________________________________________  ______________________________________________________________________________________________________________________________  What do you like most about your relationships with others?  ________________________________________________________________________________________________________________________________________________________________________________________________________________________________________________________________________________________________________________________________________________  My goal:  ______________________________________________________________________  I will: ______________________________________________________________________________________________________________________________________________________________________________________________    For informational purposes only. Not to replace the advice of your health care provider. Copyright   2018 [facility]. All rights reserved. Clinically reviewed by [facility]  [facility]. [employer] [id number] - Rev 06/24.  Learning About Stress  What is stress?     Stress is your body's response to a hard situation. Your body can have a physical, emotional, or mental response. Stress is a fact of life for most people, and it affects everyone differently. What causes stress for you may not be stressful for someone else.  A lot of things can cause stress. You may feel stress when you go on a job interview, take a test, or run a race. This kind of short-term stress is normal and even useful. It can help you if you need to work hard or react quickly. For example, stress can help you finish an important job on time.  Long-term stress is caused by ongoing stressful situations or events. Examples of long-term stress include long-term health problems, ongoing problems at work, or conflicts in your family. Long-term stress can harm your health.  How does stress affect your health?  When you are stressed, your body responds as though you are in danger. It makes hormones that speed up your heart, make you breathe faster, and give you a burst of energy. This is called the fight-or-flight stress response. If the stress is over quickly, your body goes back to normal and no harm is done.  But if stress happens too often or lasts too long, it can have bad effects. Long-term stress can make you more likely to get sick, and it can make symptoms of some diseases worse. If you tense up when you are stressed, you may develop neck, shoulder, or low back  pain. Stress is linked to high blood pressure and heart disease.  Stress also harms your emotional health. It can make you serna, tense, or depressed. Your relationships may suffer, and you may not do well at work or school.  What can you do to manage stress?  You can try these things to help manage stress:   Do something active. Exercise or activity can help reduce stress. Walking is a great way to get started. Even everyday activities such as housecleaning or yard work can help.  Try yoga or gifty chi. These techniques combine exercise and meditation. You may need some training at first to learn them.  Do something you enjoy. For example, listen to music or go to a movie. Practice your hobby or do volunteer work.  Meditate. This can help you relax, because you are not worrying about what happened before or what may happen in the future.  Do guided imagery. Imagine yourself in any setting that helps you feel calm. You can use online videos, books, or a teacher to guide you.  Do breathing exercises. For example:  From a standing position, bend forward from the waist with your knees slightly bent. Let your arms dangle close to the floor.  Breathe in slowly and deeply as you return to a standing position. Roll up slowly and lift your head last.  Hold your breath for just a few seconds in the standing position.  Breathe out slowly and bend forward from the waist.  Let your feelings out. Talk, laugh, cry, and express anger when you need to. Talking with supportive friends or family, a counselor, or a reny leader about your feelings is a healthy way to relieve stress. Avoid discussing your feelings with people who make you feel worse.  Write. It may help to write about things that are bothering you. This helps you find out how much stress you feel and what is causing it. When you know this, you can find better ways to cope.  What can you do to prevent stress?  You might try some of these things to help prevent  "stress:  Manage your time. This helps you find time to do the things you want and need to do.  Get enough sleep. Your body recovers from the stresses of the day while you are sleeping.  Get support. Your family, friends, and community can make a difference in how you experience stress.  Limit your news feed. Avoid or limit time on social media or news that may make you feel stressed.  Do something active. Exercise or activity can help reduce stress. Walking is a great way to get started.  Where can you learn more?  Go to https://www.[employer].net/patiented  Enter N032 in the search box to learn more about \"Learning About Stress.\"  Current as of: October 24, 2024  Content Version: 14.4 2024-2025 [employer].   Care instructions adapted under license by your healthcare professional. If you have questions about a medical condition or this instruction, always ask your healthcare professional. [employer] disclaims any warranty or liability for your use of this information.    9 Ways to Cut Back on Drinking  Maybe you've found yourself drinking more alcohol than you'd prefer. If you want to cut back, here are some ideas to try.    Think before you drink.  Do you really want a drink, or is it just a habit? If you're used to having a drink at a certain time, try doing something else then.     Look for substitutes.  Find some no-alcohol drinks that you enjoy, like flavored seltzer water, tea with honey, or tonic with a slice of lime. Or try alcohol-free beer or \"virgin\" cocktails (without the alcohol).     Drink more water.  Use water to quench your thirst. Drink a glass of water before you have any alcohol. Have another glass along with every drink or between drinks.     Shrink your drink.  For example, have a bottle of beer instead of a pint. Use a smaller glass for wine. Choose drinks with lower alcohol content (ABV%). Or use less liquor and more mixer in cocktails.     Slow down.  It's easy to " "drink quickly and without thinking about it. Pay attention, and make each drink last longer.     Do the math.  Total up how much you spend on alcohol each month. How much is that a year? If you cut back, what could you do with the money you save?     Take a break.  Choose a day or two each week when you won't drink at all. Notice how you feel on those days, physically and emotionally. How did you sleep? Do you feel better? Over time, add more break days.     Count calories.  Would you like to lose some weight? For some people that's a good motivator for cutting back. Figure out how many calories are in each drink. How many does that add up to in a day? In a week? In a month?     Practice saying no.  Be ready when someone offers you a drink. Try: \"Thanks, I've had enough.\" Or \"Thanks, but I'm cutting back.\" Or \"No, thanks. I feel better when I drink less.\"   Current as of: August 20, 2024  Content Version: 14.4    7109-2142 [employer].   Care instructions adapted under license by your healthcare professional. If you have questions about a medical condition or this instruction, always ask your healthcare professional. [employer] disclaims any warranty or liability for your use of this information.     "

## 2025-05-08 NOTE — PROGRESS NOTES
Preventive Care Visit  [facility] YUMIKO Arreola MD, Internal Medicine  May 8, 2025        Assessment and Plan  1. Routine general medical examination at a health care facility (Primary)    Last seen patient in May 2024 for preoperative clearance on the lipoma removal, he is here for annual physical..    Does have medical conditions of diabetes on metformin 1000 mg twice daily, gout on allopurinol as well as hypertension on olmesartan and amlodipine.    Last lab work in May 2024 showing normal BMP, lipid panel with hypertriglyceridemia, CBC PSA remaining normal.  Can recheck at this time.    - Lipid panel reflex to direct LDL Non-fasting; Future  - Uric acid; Future  - REVIEW OF HEALTH MAINTENANCE PROTOCOL ORDERS  - Pneumococcal 20 Valent Conjugate (PCV20)  - Comprehensive metabolic panel (BMP + Alb, Alk Phos, ALT, AST, Total. Bili, TP); Future  - CBC with platelets; Future  - PSA, screen; Future  - Hemoglobin A1c; Future  - Lipid panel reflex to direct LDL Non-fasting  - Uric acid  - Comprehensive metabolic panel (BMP + Alb, Alk Phos, ALT, AST, Total. Bili, TP)  - CBC with platelets  - PSA, screen  - Hemoglobin A1c  - TSH with free T4 reflex; Future    2. Morbid obesity (H)  Ongoing problem, Uncontrolled. Most part of the appt pt was   - semaglutide-weight management (WEGOVY) 0.25 MG/0.5ML pen; Inject 0.5 mLs (0.25 mg) subcutaneously once a week.  Dispense: 2 mL; Refill: 0  - TSH with free T4 reflex; Future    3. Chronic gout without tophus, unspecified cause, unspecified site  - Uric acid; Future  - Uric acid    4. Essential hypertension  - Comprehensive metabolic panel (BMP + Alb, Alk Phos, ALT, AST, Total. Bili, TP); Future  - CBC with platelets; Future  - amLODIPine (NORVASC) 10 MG tablet; Take 1 tablet (10 mg) by mouth daily.  Dispense: 90 tablet; Refill: 3  - olmesartan (BENICAR) 40 MG tablet; Take 1 tablet (40 mg) by mouth daily.  Dispense: 90 tablet; Refill: 3  - Comprehensive  metabolic panel (BMP + Alb, Alk Phos, ALT, AST, Total. Bili, TP)  - CBC with platelets    5. Hyperlipidemia LDL goal <100  - Lipid panel reflex to direct LDL Non-fasting; Future  - Lipid panel reflex to direct LDL Non-fasting    6. Need for vaccination  - Pneumococcal 20 Valent Conjugate (PCV20)    7. Prediabetes  - Hemoglobin A1c; Future  - metFORMIN (GLUCOPHAGE) 500 MG tablet; TAKE 2 TABLETS BY MOUTH 2 TIMES DAILY WITH MEALS Strength: 500 mg  Dispense: 180 tablet; Refill: 1  - Hemoglobin A1c    8. Encounter for prostate cancer screening  - PSA, screen; Future  - PSA, screen      The longitudinal plan of care for the diagnosis(es)/condition(s) as documented were addressed during this visit. Due to the added complexity in care, I will continue to support Waylon in the subsequent management and with ongoing continuity of care.      Please note that this note consists of symbols derived from keyboarding, dictation and/or voice recognition software. As a result, there may be errors in the script that have gone undetected. Please consider this when interpreting information found in this chart.    Patient Instructions   As discussed, please do fasting labs placed - fasting LAB only appt.     Refills will be taken care     ======================    Patient Education  Preventive Care Advice   This is general advice given by our system to help you stay healthy. However, your care team may have specific advice just for you. Please talk to your care team about your preventive care needs.  Nutrition  Eat 5 or more servings of fruits and vegetables each day.  Try wheat bread, brown rice and whole grain pasta (instead of white bread, rice, and pasta).  Get enough calcium and vitamin D. Check the label on foods and aim for 100% of the RDA (recommended daily allowance).  Lifestyle  Exercise at least 150 minutes each week  (30 minutes a day, 5 days a week).  Do muscle strengthening activities 2 days a week. These help control your  weight and prevent disease.  No smoking.  Wear sunscreen to prevent skin cancer.  Have a dental exam and cleaning every 6 months.  Yearly exams  See your health care team every year to talk about:  Any changes in your health.  Any medicines your care team has prescribed.  Preventive care, family planning, and ways to prevent chronic diseases.  Shots (vaccines)   HPV shots (up to age 26), if you've never had them before.  Hepatitis B shots (up to age 59), if you've never had them before.  COVID-19 shot: Get this shot when it's due.  Flu shot: Get a flu shot every year.  Tetanus shot: Get a tetanus shot every 10 years.  Pneumococcal, hepatitis A, and RSV shots: Ask your care team if you need these based on your risk.  Shingles shot (for age 50 and up)  General health tests  Diabetes screening:  Starting at age 35, Get screened for diabetes at least every 3 years.  If you are younger than age 35, ask your care team if you should be screened for diabetes.  Cholesterol test: At age 39, start having a cholesterol test every 5 years, or more often if advised.  Bone density scan (DEXA): At age 50, ask your care team if you should have this scan for osteoporosis (brittle bones).  Hepatitis C: Get tested at least once in your life.  STIs (sexually transmitted infections)  Before age 24: Ask your care team if you should be screened for STIs.  After age 24: Get screened for STIs if you're at risk. You are at risk for STIs (including HIV) if:  You are sexually active with more than one person.  You don't use condoms every time.  You or a partner was diagnosed with a sexually transmitted infection.  If you are at risk for HIV, ask about PrEP medicine to prevent HIV.  Get tested for HIV at least once in your life, whether you are at risk for HIV or not.  Cancer screening tests  Cervical cancer screening: If you have a cervix, begin getting regular cervical cancer screening tests starting at age 21.  Breast cancer scan (mammogram):  If you've ever had breasts, begin having regular mammograms starting at age 40. This is a scan to check for breast cancer.  Colon cancer screening: It is important to start screening for colon cancer at age 45.  Have a colonoscopy test every 10 years (or more often if you're at risk) Or, ask your provider about stool tests like a FIT test every year or Cologuard test every 3 years.  To learn more about your testing options, visit:   .  For help making a decision, visit:   https://bit.ly/sq06149.  Prostate cancer screening test: If you have a prostate, ask your care team if a prostate cancer screening test (PSA) at age 55 is right for you.  Lung cancer screening: If you are a current or former smoker ages 50 to 80, ask your care team if ongoing lung cancer screenings are right for you.  For informational purposes only. Not to replace the advice of your health care provider. Copyright   2023 [employer]. All rights reserved. Clinically reviewed by the [facility] Transitions Program. [employer] 712744 - REV 01/24.  Relationships for Good Health  Relationships are important for our health and happiness. Social isolation, loneliness and lack of support are bad for your health. Studies show that loneliness can harm health and limit your life span as much as high blood pressure and smoking.   Take some time to reflect on your relationships. Then answer these questions:  Are there people in your life that cause you stress or drain your energy? What can you do to set limits?  ________________________________________________________________________________________________________________________________________________________________________________________________________________________________________________________________________________________________________________________________________________  Who do you enjoy spending time with? Who can you go to for  support?  ________________________________________________________________________________________________________________________________________________________________________________________________________________________________________________________________________________________________________________________________________________  What can you do to improve your relationships with others?  __________________________________________________________________________________________________________________________________________________________________________________________________________________  ______________________________________________________________________________________________________________________________  What do you like most about your relationships with others?  ________________________________________________________________________________________________________________________________________________________________________________________________________________________________________________________________________________________________________________________________________________  My goal: ______________________________________________________________________  I will: ______________________________________________________________________________________________________________________________________________________________________________________________    For informational purposes only. Not to replace the advice of your health care provider. Copyright   2018 [facility]. All rights reserved. Clinically reviewed by Bariatric Health  Team. [employer] 206158 - Rev 06/24.  Learning About Stress  What is stress?     Stress is your body's response to a hard situation. Your body can have a physical, emotional, or mental response. Stress is a fact of life for most people, and it affects everyone differently. What causes stress for you may not be  stressful for someone else.  A lot of things can cause stress. You may feel stress when you go on a job interview, take a test, or run a race. This kind of short-term stress is normal and even useful. It can help you if you need to work hard or react quickly. For example, stress can help you finish an important job on time.  Long-term stress is caused by ongoing stressful situations or events. Examples of long-term stress include long-term health problems, ongoing problems at work, or conflicts in your family. Long-term stress can harm your health.  How does stress affect your health?  When you are stressed, your body responds as though you are in danger. It makes hormones that speed up your heart, make you breathe faster, and give you a burst of energy. This is called the fight-or-flight stress response. If the stress is over quickly, your body goes back to normal and no harm is done.  But if stress happens too often or lasts too long, it can have bad effects. Long-term stress can make you more likely to get sick, and it can make symptoms of some diseases worse. If you tense up when you are stressed, you may develop neck, shoulder, or low back pain. Stress is linked to high blood pressure and heart disease.  Stress also harms your emotional health. It can make you serna, tense, or depressed. Your relationships may suffer, and you may not do well at work or school.  What can you do to manage stress?  You can try these things to help manage stress:   Do something active. Exercise or activity can help reduce stress. Walking is a great way to get started. Even everyday activities such as housecleaning or yard work can help.  Try yoga or gifty chi. These techniques combine exercise and meditation. You may need some training at first to learn them.  Do something you enjoy. For example, listen to music or go to a movie. Practice your hobby or do volunteer work.  Meditate. This can help you relax, because you are not  "worrying about what happened before or what may happen in the future.  Do guided imagery. Imagine yourself in any setting that helps you feel calm. You can use online videos, books, or a teacher to guide you.  Do breathing exercises. For example:  From a standing position, bend forward from the waist with your knees slightly bent. Let your arms dangle close to the floor.  Breathe in slowly and deeply as you return to a standing position. Roll up slowly and lift your head last.  Hold your breath for just a few seconds in the standing position.  Breathe out slowly and bend forward from the waist.  Let your feelings out. Talk, laugh, cry, and express anger when you need to. Talking with supportive friends or family, a counselor, or a reny leader about your feelings is a healthy way to relieve stress. Avoid discussing your feelings with people who make you feel worse.  Write. It may help to write about things that are bothering you. This helps you find out how much stress you feel and what is causing it. When you know this, you can find better ways to cope.  What can you do to prevent stress?  You might try some of these things to help prevent stress:  Manage your time. This helps you find time to do the things you want and need to do.  Get enough sleep. Your body recovers from the stresses of the day while you are sleeping.  Get support. Your family, friends, and community can make a difference in how you experience stress.  Limit your news feed. Avoid or limit time on social media or news that may make you feel stressed.  Do something active. Exercise or activity can help reduce stress. Walking is a great way to get started.  Where can you learn more?  Go to https://www.[employer].net/patiented  Enter N032 in the search box to learn more about \"Learning About Stress.\"  Current as of: October 24, 2024  Content Version: 14.4    5955-5783 [facility] [employer].   Care instructions adapted under license by your " "healthcare professional. If you have questions about a medical condition or this instruction, always ask your healthcare professional. [employer] disclaims any warranty or liability for your use of this information.    9 Ways to Cut Back on Drinking  Maybe you've found yourself drinking more alcohol than you'd prefer. If you want to cut back, here are some ideas to try.    Think before you drink.  Do you really want a drink, or is it just a habit? If you're used to having a drink at a certain time, try doing something else then.     Look for substitutes.  Find some no-alcohol drinks that you enjoy, like flavored seltzer water, tea with honey, or tonic with a slice of lime. Or try alcohol-free beer or \"virgin\" cocktails (without the alcohol).     Drink more water.  Use water to quench your thirst. Drink a glass of water before you have any alcohol. Have another glass along with every drink or between drinks.     Shrink your drink.  For example, have a bottle of beer instead of a pint. Use a smaller glass for wine. Choose drinks with lower alcohol content (ABV%). Or use less liquor and more mixer in cocktails.     Slow down.  It's easy to drink quickly and without thinking about it. Pay attention, and make each drink last longer.     Do the math.  Total up how much you spend on alcohol each month. How much is that a year? If you cut back, what could you do with the money you save?     Take a break.  Choose a day or two each week when you won't drink at all. Notice how you feel on those days, physically and emotionally. How did you sleep? Do you feel better? Over time, add more break days.     Count calories.  Would you like to lose some weight? For some people that's a good motivator for cutting back. Figure out how many calories are in each drink. How many does that add up to in a day? In a week? In a month?     Practice saying no.  Be ready when someone offers you a drink. Try: \"Thanks, I've had enough.\" " "Or \"Thanks, but I'm cutting back.\" Or \"No, thanks. I feel better when I drink less.\"   Current as of: August 20, 2024  Content Version: 14.4    [id number] [employer].   Care instructions adapted under license by your healthcare professional. If you have questions about a medical condition or this instruction, always ask your healthcare professional. [employer] disclaims any warranty or liability for your use of this information.     Return in about 3 months (around 8/8/2025), or if symptoms worsen or fail to improve, for video visit, If symptoms persist, Obesity.    Lea Arreola MD  [facility] YUMIKO Connors is a 54 year old, presenting for the following:  Physical and Hypertension        5/8/2025    10:42 AM   Additional Questions   Roomed by [facility]    Hypertension Follow-up    Do you check your blood pressure regularly outside of the clinic? No   Are you following a low salt diet? Yes  Are your blood pressures ever more than 140 on the top number (systolic) OR more   than 90 on the bottom number (diastolic), for example 140/90? N/A    BP Readings from Last 2 Encounters:   05/08/25 134/78   07/12/24 135/85       Advance Care Planning    Discussed advance care planning with patient; informed AVS has link to [facility] [employer].        5/5/2025   General Health   How would you rate your overall physical health? (!) FAIR   Feel stress (tense, anxious, or unable to sleep) To some extent   (!) STRESS CONCERN      5/5/2025   Nutrition   Three or more servings of calcium each day? (!) NO   Diet: Regular (no restrictions)   How many servings of fruit and vegetables per day? (!) 0-1   How many sweetened beverages each day? 0-1         5/5/2025   Exercise   Days per week of moderate/strenous exercise 1 day   Average minutes spent exercising at this level 60 min   (!) EXERCISE CONCERN      5/5/2025   Social Factors   Frequency of gathering with " friends or relatives Never   Worry food won't last until get money to buy more No   Food not last or not have enough money for food? No   Do you have housing? (Housing is defined as stable permanent housing and does not include staying outside in a car, in a tent, in an abandoned building, in an overnight shelter, or couch-surfing.) Yes   Are you worried about losing your housing? No   Lack of transportation? No   Unable to get utilities (heat,electricity)? No   (!) SOCIAL CONNECTIONS CONCERN      5/5/2025   Fall Risk   Fallen 2 or more times in the past year? No   Trouble with walking or balance? No          5/5/2025   Dental   Dentist two times every year? Yes         Today's PHQ-2 Score:       5/8/2025    10:32 AM   PHQ-2 ( 1999 [employer])   Q1: Little interest or pleasure in doing things 0   Q2: Feeling down, depressed or hopeless 0   PHQ-2 Score 0    Q1: Little interest or pleasure in doing things Not at all   Q2: Feeling down, depressed or hopeless Not at all   PHQ-2 Score 0       Patient-reported           5/5/2025   Substance Use   Alcohol more than 3/day or more than 7/wk Yes   How often do you have a drink containing alcohol 4 or more times a week   How many alcohol drinks on typical day 1 or 2   How often do you have 5+ drinks at one occasion Less than monthly   Audit 2/3 Score 1   How often not able to stop drinking once started Never   How often failed to do what normally expected Never   How often needed first drink in am after a heavy drinking session Never   How often feeling of guilt or remorse after drinking Never   How often unable to remember what happened the night before Never   Have you or someone else been injured because of your drinking No   Has anyone been concerned or suggested you cut down on drinking No   TOTAL SCORE - AUDIT 5   Do you use any other substances recreationally? No     Social History     Tobacco Use    Smoking status: Never    Smokeless tobacco: Never   Vaping Use    Vaping  status: Never Used   Substance Use Topics    Alcohol use: Yes     Comment: occ.    Drug use: No           5/5/2025   STI Screening   New sexual partner(s) since last STI/HIV test? No   Last PSA:   PSA   Date Value Ref Range Status   06/28/2019 0.70 0 - 4 ug/L Final     Comment:     Assay Method:  Chemiluminescence using Siemens Vista analyzer     Prostate Specific Antigen Screen   Date Value Ref Range Status   01/17/2024 2.58 0.00 - 3.50 ng/mL Final     ASCVD Risk   The 10-year ASCVD risk score (Nisa HODGES, et al., 2019) is: 11.5%    Values used to calculate the score:      Age: 54 years      Sex: Male      Is Non- : No      Diabetic: No      Tobacco smoker: No      Systolic Blood Pressure: 134 mmHg      Is BP treated: Yes      HDL Cholesterol: 22 mg/dL      Total Cholesterol: 154 mg/dL      Reviewed and updated as needed this visit by Provider   Tobacco  Allergies  Meds  Problems  Med Hx  Surg Hx  Fam Hx            Past Medical History:   Diagnosis Date    Hyperlipidemia     Hypertension      Past Surgical History:   Procedure Laterality Date    EXCISE MASS BACK N/A 5/17/2024    Procedure: EXCISION, MASS, BACK;  Surgeon: Jeremie Dunn MD;  [facility]     Lab work is in process  Labs reviewed in EPIC  BP Readings from Last 3 Encounters:   05/08/25 134/78   07/12/24 135/85   05/31/24 124/83    Wt Readings from Last 3 Encounters:   05/08/25 117.9 kg (260 lb)   07/12/24 116.6 kg (257 lb)   05/31/24 118 kg (260 lb 1.6 oz)                  Patient Active Problem List   Diagnosis    Essential hypertension    Hyperlipidemia LDL goal <100    Gout    Preventive measure    Morbid obesity (H)    Lumbar radiculopathy     Past Surgical History:   Procedure Laterality Date    EXCISE MASS BACK N/A 5/17/2024    Procedure: EXCISION, MASS, BACK;  Surgeon: Jeremie Dunn MD;  [facility] [place] [facility]       Social History     Tobacco Use    Smoking status: Never    Smokeless tobacco: Never    Substance Use Topics    Alcohol use: Yes     Comment: occ.     Family History   Problem Relation Age of Onset    Diabetes Mother     Cerebrovascular Disease Mother         minor stroke    Obesity Mother     Family History Negative Father         health hx unknown; smoker    Substance Abuse Father         alcoholism    Diabetes Maternal Grandmother     Obesity Maternal Grandmother     Thyroid Disease Other     Thyroid Disease Cousin          Current Outpatient Medications   Medication Sig Dispense Refill    allopurinol (ZYLOPRIM) 300 MG tablet Take 1 tablet (300 mg) by mouth daily. 30 tablet 1    amLODIPine (NORVASC) 10 MG tablet Take 1 tablet (10 mg) by mouth daily. 90 tablet 3    aspirin 81 MG tablet Take 1 tablet (81 mg) by mouth daily 30 tablet 11    metFORMIN (GLUCOPHAGE) 500 MG tablet TAKE 2 TABLETS BY MOUTH 2 TIMES DAILY WITH MEALS Strength: 500 mg 180 tablet 1    olmesartan (BENICAR) 40 MG tablet Take 1 tablet (40 mg) by mouth daily. 90 tablet 3    semaglutide-weight management (WEGOVY) 0.25 MG/0.5ML pen Inject 0.5 mLs (0.25 mg) subcutaneously once a week. 2 mL 0     No Known Allergies  Recent Labs   Lab Test 05/08/25  1151 05/01/24  1709 01/17/24  1034 03/15/23  1122 10/01/21  1133 10/01/21  1133 08/20/20  0842 06/28/19  0800   A1C 5.1  --  5.2 5.3   < > 5.3 5.0  --    LDL  --   --  91 97  --  91 64 71   HDL  --   --  22* 22*  --  18* 20* 19*   TRIG  --   --  207* 164*  --  220* 187* 186*   ALT  --   --  50 55*  --  141* 68 80*   CR  --  0.97 0.88 0.86   < > 1.09 0.97 0.91   GFRESTIMATED  --  >90 >90 >90   < > 78 90 >90   GFRESTBLACK  --   --   --   --   --   --  >90 >90   POTASSIUM  --  4.4 4.3 4.2   < > 3.9 4.1 3.7   TSH  --   --   --  3.75  --   --   --   --     < > = values in this interval not displayed.          Review of Systems  Constitutional, HEENT, cardiovascular, pulmonary, GI, , musculoskeletal, neuro, skin, endocrine and psych systems are negative, except as otherwise noted.     Objective   "  Exam  /78 (BP Location: Right arm, Patient Position: Sitting, Cuff Size: Adult Regular)   Pulse 100   Temp 98.2  F (36.8  C) (Tympanic)   Resp 12   Ht 1.687 m (5' 6.42\")   Wt 117.9 kg (260 lb)   SpO2 98%   BMI 41.44 kg/m     Estimated body mass index is 41.44 kg/m  as calculated from the following:    Height as of this encounter: 1.687 m (5' 6.42\").    Weight as of this encounter: 117.9 kg (260 lb).    Physical Exam  GENERAL: alert and no distress  EYES: Eyes grossly normal to inspection, PERRL and conjunctivae and sclerae normal  HENT: ear canals and TM's normal, nose and mouth without ulcers or lesions  NECK: no adenopathy, no asymmetry, masses, or scars  RESP: lungs clear to auscultation - no rales, rhonchi or wheezes  CV: regular rate and rhythm, normal S1 S2, no S3 or S4, no murmur, click or rub, no peripheral edema  ABDOMEN: soft, nontender, no hepatosplenomegaly, no masses and bowel sounds normal  MS: no gross musculoskeletal defects noted, no edema  SKIN: no suspicious lesions or rashes  NEURO: Normal strength and tone, mentation intact and speech normal  PSYCH: mentation appears normal, affect normal/bright        Signed Electronically by: Lea Arreola MD    "

## 2025-05-11 ENCOUNTER — RESULTS FOLLOW-UP (OUTPATIENT)
Dept: FAMILY MEDICINE | Facility: CLINIC | Age: 55
End: 2025-05-11
Payer: COMMERCIAL

## 2025-05-11 DIAGNOSIS — M1A.0790 IDIOPATHIC CHRONIC GOUT OF FOOT WITHOUT TOPHUS, UNSPECIFIED LATERALITY: ICD-10-CM

## 2025-05-11 LAB — HBV SURFACE AG SERPL QL IA: NONREACTIVE

## 2025-05-12 ENCOUNTER — TELEPHONE (OUTPATIENT)
Dept: FAMILY MEDICINE | Facility: CLINIC | Age: 55
End: 2025-05-12
Payer: COMMERCIAL

## 2025-05-12 NOTE — TELEPHONE ENCOUNTER
Please see mychart from patient and advise appropriate course of action.      Wyatt Emerson RN  [facility] Triage Nurse

## 2025-05-12 NOTE — TELEPHONE ENCOUNTER
Note from pharm: semaglutide-weight management (WEGOVY) 0.25 MG/0.5ML pen: ALTERNATIVE REQUESTED

## 2025-05-12 NOTE — TELEPHONE ENCOUNTER
"Please let the pt know insurance non coverage of Wegovy.     Advise Pt to check with his insurance plan on covered alternatives for weight loss medications \" On his insurance plan \" so we can prescribe accordingly    "

## 2025-05-13 RX ORDER — ALLOPURINOL 300 MG/1
1 TABLET ORAL DAILY
Qty: 30 TABLET | Refills: 1 | Status: CANCELLED | OUTPATIENT
Start: 2025-05-13

## 2025-05-13 NOTE — TELEPHONE ENCOUNTER
Please review pt [facility] -- pt asking for enough refills for a year supply of allopurinol. Please update if appropriate.     Lexie Ji RN

## 2025-05-15 DIAGNOSIS — M1A.0790 IDIOPATHIC CHRONIC GOUT OF FOOT WITHOUT TOPHUS, UNSPECIFIED LATERALITY: ICD-10-CM

## 2025-05-16 ENCOUNTER — ANCILLARY PROCEDURE (OUTPATIENT)
Dept: ULTRASOUND IMAGING | Facility: CLINIC | Age: 55
End: 2025-05-16
Attending: INTERNAL MEDICINE
Payer: COMMERCIAL

## 2025-05-16 DIAGNOSIS — R79.89 ELEVATED LFTS: ICD-10-CM

## 2025-05-16 PROCEDURE — 76705 ECHO EXAM OF ABDOMEN: CPT

## 2025-05-17 RX ORDER — ALLOPURINOL 300 MG/1
1 TABLET ORAL DAILY
Qty: 90 TABLET | Refills: 1 | Status: SHIPPED | OUTPATIENT
Start: 2025-05-17

## 2025-05-17 NOTE — TELEPHONE ENCOUNTER
Refill done.    Pt to keep up scheduled cholesterol follow up video visit on 8/8/2025    Thank you,  Lea Arreola MD on 5/17/2025

## 2025-05-20 ENCOUNTER — LAB (OUTPATIENT)
Dept: LAB | Facility: CLINIC | Age: 55
End: 2025-05-20
Payer: COMMERCIAL

## 2025-05-20 DIAGNOSIS — R79.89 ELEVATED LFTS: ICD-10-CM

## 2025-05-20 PROCEDURE — 86709 HEPATITIS A IGM ANTIBODY: CPT

## 2025-05-20 PROCEDURE — 36415 COLL VENOUS BLD VENIPUNCTURE: CPT

## 2025-05-20 PROCEDURE — 86803 HEPATITIS C AB TEST: CPT

## 2025-05-21 ENCOUNTER — MYC MEDICAL ADVICE (OUTPATIENT)
Dept: FAMILY MEDICINE | Facility: CLINIC | Age: 55
End: 2025-05-21
Payer: COMMERCIAL

## 2025-05-21 LAB
HAV IGM SERPL QL IA: NONREACTIVE
HCV AB SERPL QL IA: NONREACTIVE

## 2025-05-30 ENCOUNTER — ANCILLARY PROCEDURE (OUTPATIENT)
Dept: MRI IMAGING | Facility: CLINIC | Age: 55
End: 2025-05-30
Attending: INTERNAL MEDICINE
Payer: COMMERCIAL

## 2025-05-30 DIAGNOSIS — N28.1 RENAL CYST, RIGHT: ICD-10-CM

## 2025-05-30 PROCEDURE — 255N000002 HC RX 255 OP 636: Performed by: INTERNAL MEDICINE

## 2025-05-30 PROCEDURE — A9585 GADOBUTROL INJECTION: HCPCS | Performed by: INTERNAL MEDICINE

## 2025-05-30 PROCEDURE — 74183 MRI ABD W/O CNTR FLWD CNTR: CPT

## 2025-05-30 RX ORDER — GADOBUTROL 604.72 MG/ML
12 INJECTION INTRAVENOUS ONCE
Status: COMPLETED | OUTPATIENT
Start: 2025-05-30 | End: 2025-05-30

## 2025-05-30 RX ADMIN — GADOBUTROL 12 ML: 604.72 INJECTION INTRAVENOUS at 11:32

## 2025-07-31 ENCOUNTER — MYC MEDICAL ADVICE (OUTPATIENT)
Dept: FAMILY MEDICINE | Facility: CLINIC | Age: 55
End: 2025-07-31
Payer: COMMERCIAL

## 2025-08-07 ENCOUNTER — OFFICE VISIT (OUTPATIENT)
Dept: UROLOGY | Facility: CLINIC | Age: 55
End: 2025-08-07
Attending: INTERNAL MEDICINE
Payer: COMMERCIAL

## 2025-08-07 VITALS
BODY MASS INDEX: 40.81 KG/M2 | WEIGHT: 260 LBS | HEIGHT: 67 IN | HEART RATE: 97 BPM | OXYGEN SATURATION: 98 % | DIASTOLIC BLOOD PRESSURE: 84 MMHG | SYSTOLIC BLOOD PRESSURE: 129 MMHG

## 2025-08-07 DIAGNOSIS — N40.0 PROSTATE ENLARGEMENT: ICD-10-CM

## 2025-08-07 LAB
ALBUMIN UR-MCNC: 30 MG/DL
APPEARANCE UR: CLEAR
BILIRUB UR QL STRIP: NEGATIVE
COLOR UR AUTO: YELLOW
GLUCOSE UR STRIP-MCNC: NEGATIVE MG/DL
HGB UR QL STRIP: NEGATIVE
KETONES UR STRIP-MCNC: NEGATIVE MG/DL
LEUKOCYTE ESTERASE UR QL STRIP: NEGATIVE
NITRATE UR QL: NEGATIVE
PH UR STRIP: 6 [PH] (ref 5–7)
SP GR UR STRIP: 1.02 (ref 1–1.03)
T WHIPPLEI DNA BLD QL NAA+PROBE: 17
UROBILINOGEN UR STRIP-ACNC: 0.2 E.U./DL

## 2025-08-07 ASSESSMENT — PAIN SCALES - GENERAL: PAINLEVEL_OUTOF10: NO PAIN (0)

## 2025-08-12 ENCOUNTER — APPOINTMENT (OUTPATIENT)
Dept: LAB | Facility: CLINIC | Age: 55
End: 2025-08-12
Payer: COMMERCIAL

## 2025-08-13 ENCOUNTER — ANCILLARY PROCEDURE (OUTPATIENT)
Dept: ULTRASOUND IMAGING | Facility: CLINIC | Age: 55
End: 2025-08-13
Attending: INTERNAL MEDICINE
Payer: COMMERCIAL

## 2025-08-13 DIAGNOSIS — K76.0 HEPATIC STEATOSIS: ICD-10-CM

## 2025-08-13 DIAGNOSIS — R79.89 ELEVATED LFTS: ICD-10-CM

## 2025-08-13 PROCEDURE — 76981 USE PARENCHYMA: CPT | Mod: GC | Performed by: RADIOLOGY

## 2025-08-14 ENCOUNTER — RESULTS FOLLOW-UP (OUTPATIENT)
Dept: FAMILY MEDICINE | Facility: CLINIC | Age: 55
End: 2025-08-14
Payer: COMMERCIAL

## 2025-08-14 DIAGNOSIS — F10.20 ALCOHOL DEPENDENCE, UNCOMPLICATED (H): ICD-10-CM

## 2025-08-14 DIAGNOSIS — R79.89 ELEVATED LFTS: ICD-10-CM

## 2025-08-14 DIAGNOSIS — K21.9 GASTROESOPHAGEAL REFLUX DISEASE, UNSPECIFIED WHETHER ESOPHAGITIS PRESENT: ICD-10-CM

## 2025-08-14 DIAGNOSIS — R73.03 PREDIABETES: ICD-10-CM

## 2025-08-14 DIAGNOSIS — K76.0 HEPATIC STEATOSIS: ICD-10-CM

## 2025-08-14 DIAGNOSIS — E66.01 MORBID OBESITY (H): Primary | ICD-10-CM

## 2025-08-14 DIAGNOSIS — E66.813 CLASS 3 SEVERE OBESITY DUE TO EXCESS CALORIES WITH BODY MASS INDEX (BMI) OF 40.0 TO 44.9 IN ADULT (H): ICD-10-CM

## 2025-08-14 DIAGNOSIS — E78.5 DYSLIPIDEMIA: ICD-10-CM

## 2025-08-14 PROCEDURE — 99207 E-CONSULT TO HEPATOLOGY (ADULT OUTPT PROVIDER TO SPECIALIST WRITTEN QUESTION & RESPONSE): CPT | Performed by: INTERNAL MEDICINE

## 2025-08-19 ENCOUNTER — E-CONSULT (OUTPATIENT)
Dept: MULTI SPECIALTY CLINIC | Facility: CLINIC | Age: 55
End: 2025-08-19
Payer: COMMERCIAL

## (undated) DEVICE — NDL 27GA 1.25" 305136

## (undated) DEVICE — DRAPE LAP PEDS DISP 29492

## (undated) DEVICE — SURGICEL HEMOSTAT 4X8" 1952

## (undated) DEVICE — SOL NACL 0.9% IRRIG 500ML BOTTLE 2F7123

## (undated) DEVICE — SU VICRYL 3-0 PS-2 27" UND J427H

## (undated) DEVICE — SU DERMABOND ADVANCED .7ML DNX12

## (undated) DEVICE — ESU GROUND PAD ADULT W/CORD E7507

## (undated) DEVICE — SU VICRYL 3-0 CT-1 36" J344H

## (undated) DEVICE — PACK MINOR CUSTOM ASC

## (undated) DEVICE — GLOVE BIOGEL PI MICRO INDICATOR UNDERGLOVE SZ 7.5 48975

## (undated) DEVICE — SPECIMEN CONTAINER W/10% BUFFERED FORMALIN 120ML 591201

## (undated) DEVICE — DRAPE LAP TRANSVERSE 29421

## (undated) DEVICE — SU MONOCRYL 4-0 P-3 18" UND Y494G

## (undated) DEVICE — DRSG STERI STRIP 1/2X4" R1547

## (undated) DEVICE — GLOVE BIOGEL PI MICRO SZ 7.5 48575

## (undated) DEVICE — DRSG PRIMAPORE 03 1/8X6" 66000318

## (undated) DEVICE — LINEN TOWEL PACK X5 5464

## (undated) DEVICE — SU VICRYL 3-0 SH 8X18" UND J864D

## (undated) DEVICE — ESU ELEC BLADE 2.75" COATED/INSULATED E1455

## (undated) RX ORDER — LIDOCAINE HYDROCHLORIDE 10 MG/ML
INJECTION, SOLUTION EPIDURAL; INFILTRATION; INTRACAUDAL; PERINEURAL
Status: DISPENSED
Start: 2024-05-24

## (undated) RX ORDER — PROPOFOL 10 MG/ML
INJECTION, EMULSION INTRAVENOUS
Status: DISPENSED
Start: 2024-05-17

## (undated) RX ORDER — BUPIVACAINE HYDROCHLORIDE 2.5 MG/ML
INJECTION, SOLUTION EPIDURAL; INFILTRATION; INTRACAUDAL
Status: DISPENSED
Start: 2024-05-17

## (undated) RX ORDER — OXYCODONE HYDROCHLORIDE 5 MG/1
TABLET ORAL
Status: DISPENSED
Start: 2024-05-17

## (undated) RX ORDER — FENTANYL CITRATE-0.9 % NACL/PF 10 MCG/ML
PLASTIC BAG, INJECTION (ML) INTRAVENOUS
Status: DISPENSED
Start: 2024-05-17

## (undated) RX ORDER — ACETAMINOPHEN 325 MG/1
TABLET ORAL
Status: DISPENSED
Start: 2024-05-17

## (undated) RX ORDER — EPINEPHRINE 1 MG/ML
INJECTION, SOLUTION INTRAMUSCULAR; SUBCUTANEOUS
Status: DISPENSED
Start: 2024-05-17

## (undated) RX ORDER — FENTANYL CITRATE 50 UG/ML
INJECTION, SOLUTION INTRAMUSCULAR; INTRAVENOUS
Status: DISPENSED
Start: 2024-05-17

## (undated) RX ORDER — CEFAZOLIN SODIUM 2 G/50ML
SOLUTION INTRAVENOUS
Status: DISPENSED
Start: 2024-05-17

## (undated) RX ORDER — LIDOCAINE HYDROCHLORIDE 10 MG/ML
INJECTION, SOLUTION INFILTRATION; PERINEURAL
Status: DISPENSED
Start: 2024-05-24

## (undated) RX ORDER — ONDANSETRON 2 MG/ML
INJECTION INTRAMUSCULAR; INTRAVENOUS
Status: DISPENSED
Start: 2024-05-17